# Patient Record
Sex: MALE | Race: WHITE | NOT HISPANIC OR LATINO
[De-identification: names, ages, dates, MRNs, and addresses within clinical notes are randomized per-mention and may not be internally consistent; named-entity substitution may affect disease eponyms.]

---

## 2017-09-25 PROBLEM — Z00.00 ENCOUNTER FOR PREVENTIVE HEALTH EXAMINATION: Status: ACTIVE | Noted: 2017-09-25

## 2017-09-29 ENCOUNTER — APPOINTMENT (OUTPATIENT)
Dept: NEUROLOGY | Facility: CLINIC | Age: 62
End: 2017-09-29
Payer: MEDICAID

## 2017-09-29 VITALS
HEIGHT: 70 IN | HEART RATE: 79 BPM | WEIGHT: 126 LBS | SYSTOLIC BLOOD PRESSURE: 132 MMHG | DIASTOLIC BLOOD PRESSURE: 80 MMHG | BODY MASS INDEX: 18.04 KG/M2

## 2017-09-29 DIAGNOSIS — Z78.9 OTHER SPECIFIED HEALTH STATUS: ICD-10-CM

## 2017-09-29 DIAGNOSIS — F17.200 NICOTINE DEPENDENCE, UNSPECIFIED, UNCOMPLICATED: ICD-10-CM

## 2017-09-29 DIAGNOSIS — M54.12 RADICULOPATHY, CERVICAL REGION: ICD-10-CM

## 2017-09-29 PROCEDURE — 99204 OFFICE O/P NEW MOD 45 MIN: CPT

## 2017-09-29 RX ORDER — TAMSULOSIN HYDROCHLORIDE 0.4 MG/1
0.4 CAPSULE ORAL
Refills: 0 | Status: COMPLETED | COMMUNITY

## 2017-09-29 RX ORDER — CITALOPRAM HYDROBROMIDE 20 MG/1
20 TABLET, FILM COATED ORAL
Refills: 0 | Status: COMPLETED | COMMUNITY

## 2018-03-09 ENCOUNTER — OUTPATIENT (OUTPATIENT)
Dept: OUTPATIENT SERVICES | Facility: HOSPITAL | Age: 63
LOS: 1 days | Discharge: HOME | End: 2018-03-09
Payer: MEDICAID

## 2018-03-09 DIAGNOSIS — N40.1 BENIGN PROSTATIC HYPERPLASIA WITH LOWER URINARY TRACT SYMPTOMS: ICD-10-CM

## 2018-03-09 LAB
ABO RH CONFIRMATION: SIGNIFICANT CHANGE UP
ANION GAP SERPL CALC-SCNC: 6 MMOL/L — SIGNIFICANT CHANGE UP (ref 5–17)
BASOPHILS # BLD AUTO: 0.14 K/UL — SIGNIFICANT CHANGE UP (ref 0–0.2)
BASOPHILS NFR BLD AUTO: 1.5 % — SIGNIFICANT CHANGE UP (ref 0–2)
BLD GP AB SCN SERPL QL: SIGNIFICANT CHANGE UP
BUN SERPL-MCNC: 16 MG/DL — SIGNIFICANT CHANGE UP (ref 7–23)
CALCIUM SERPL-MCNC: 9 MG/DL — SIGNIFICANT CHANGE UP (ref 8.5–10.1)
CHLORIDE SERPL-SCNC: 105 MMOL/L — SIGNIFICANT CHANGE UP (ref 96–108)
CO2 SERPL-SCNC: 29 MMOL/L — SIGNIFICANT CHANGE UP (ref 22–31)
CREAT SERPL-MCNC: 1.04 MG/DL — SIGNIFICANT CHANGE UP (ref 0.5–1.3)
EOSINOPHIL # BLD AUTO: 0.25 K/UL — SIGNIFICANT CHANGE UP (ref 0–0.5)
EOSINOPHIL NFR BLD AUTO: 2.7 % — SIGNIFICANT CHANGE UP (ref 0–6)
GLUCOSE SERPL-MCNC: 87 MG/DL — SIGNIFICANT CHANGE UP (ref 70–99)
HCT VFR BLD CALC: 48.7 % — SIGNIFICANT CHANGE UP (ref 39–50)
HGB BLD-MCNC: 15.6 G/DL — SIGNIFICANT CHANGE UP (ref 13–17)
IMM GRANULOCYTES NFR BLD AUTO: 0.4 % — SIGNIFICANT CHANGE UP (ref 0–1.5)
LYMPHOCYTES # BLD AUTO: 2.2 K/UL — SIGNIFICANT CHANGE UP (ref 1–3.3)
LYMPHOCYTES # BLD AUTO: 24 % — SIGNIFICANT CHANGE UP (ref 13–44)
MCHC RBC-ENTMCNC: 29.9 PG — SIGNIFICANT CHANGE UP (ref 27–34)
MCHC RBC-ENTMCNC: 32 GM/DL — SIGNIFICANT CHANGE UP (ref 32–36)
MCV RBC AUTO: 93.3 FL — SIGNIFICANT CHANGE UP (ref 80–100)
MONOCYTES # BLD AUTO: 0.82 K/UL — SIGNIFICANT CHANGE UP (ref 0–0.9)
MONOCYTES NFR BLD AUTO: 9 % — SIGNIFICANT CHANGE UP (ref 2–14)
NEUTROPHILS # BLD AUTO: 5.71 K/UL — SIGNIFICANT CHANGE UP (ref 1.8–7.4)
NEUTROPHILS NFR BLD AUTO: 62.4 % — SIGNIFICANT CHANGE UP (ref 43–77)
NRBC # BLD: 0 /100 WBCS — SIGNIFICANT CHANGE UP (ref 0–0)
PLATELET # BLD AUTO: 513 K/UL — HIGH (ref 150–400)
POTASSIUM SERPL-MCNC: 4 MMOL/L — SIGNIFICANT CHANGE UP (ref 3.5–5.3)
POTASSIUM SERPL-SCNC: 4 MMOL/L — SIGNIFICANT CHANGE UP (ref 3.5–5.3)
RBC # BLD: 5.22 M/UL — SIGNIFICANT CHANGE UP (ref 4.2–5.8)
RBC # FLD: 13.6 % — SIGNIFICANT CHANGE UP (ref 10.3–14.5)
SODIUM SERPL-SCNC: 140 MMOL/L — SIGNIFICANT CHANGE UP (ref 135–145)
TYPE + AB SCN PNL BLD: SIGNIFICANT CHANGE UP
WBC # BLD: 9.16 K/UL — SIGNIFICANT CHANGE UP (ref 3.8–10.5)
WBC # FLD AUTO: 9.16 K/UL — SIGNIFICANT CHANGE UP (ref 3.8–10.5)

## 2018-03-09 PROCEDURE — 93010 ELECTROCARDIOGRAM REPORT: CPT

## 2018-03-09 NOTE — CHART NOTE - NSCHARTNOTEFT_GEN_A_CORE
vital signs /67R 89 Spo2 99% T 98.1  1. BPH  greenlight laser of PVP   urine c/s and UA not done; Pt currently being treated for UTI vital signs /67R 89 Spo2 99% T 98.1  1. BPH  green-light laser of PVP   urine c/s and UA not done; Pt currently being treated for UTI  left message for Jodi ; Pt needs responsible adult to accompany him after surgery

## 2018-03-09 NOTE — ASU PATIENT PROFILE, ADULT - PMH
BPH (benign prostatic hyperplasia)    Depression BPH (benign prostatic hyperplasia)    Depression    Urinary retention  gonzalez catheter 2/12/1inserted

## 2018-03-14 NOTE — ASU PATIENT PROFILE, ADULT - PMH
BPH (benign prostatic hyperplasia)    Cervical radiculopathy    Depression    Urinary retention  gonzalez catheter 2/12/1inserted

## 2018-03-15 ENCOUNTER — OUTPATIENT (OUTPATIENT)
Dept: OUTPATIENT SERVICES | Facility: HOSPITAL | Age: 63
LOS: 1 days | Discharge: HOME | End: 2018-03-15

## 2018-03-15 VITALS
RESPIRATION RATE: 17 BRPM | HEART RATE: 62 BPM | OXYGEN SATURATION: 97 % | DIASTOLIC BLOOD PRESSURE: 50 MMHG | SYSTOLIC BLOOD PRESSURE: 102 MMHG | TEMPERATURE: 97 F

## 2018-03-15 VITALS
SYSTOLIC BLOOD PRESSURE: 114 MMHG | OXYGEN SATURATION: 98 % | RESPIRATION RATE: 16 BRPM | WEIGHT: 130.07 LBS | TEMPERATURE: 98 F | HEART RATE: 67 BPM | DIASTOLIC BLOOD PRESSURE: 63 MMHG | HEIGHT: 68 IN

## 2018-03-15 RX ORDER — FENTANYL CITRATE 50 UG/ML
50 INJECTION INTRAVENOUS
Qty: 0 | Refills: 0 | Status: DISCONTINUED | OUTPATIENT
Start: 2018-03-15 | End: 2018-03-15

## 2018-03-15 RX ORDER — OXYCODONE HYDROCHLORIDE 5 MG/1
5 TABLET ORAL EVERY 4 HOURS
Qty: 0 | Refills: 0 | Status: DISCONTINUED | OUTPATIENT
Start: 2018-03-15 | End: 2018-03-15

## 2018-03-15 RX ORDER — ACETAMINOPHEN 500 MG
975 TABLET ORAL ONCE
Qty: 0 | Refills: 0 | Status: COMPLETED | OUTPATIENT
Start: 2018-03-15 | End: 2018-03-15

## 2018-03-15 RX ORDER — ONDANSETRON 8 MG/1
4 TABLET, FILM COATED ORAL ONCE
Qty: 0 | Refills: 0 | Status: DISCONTINUED | OUTPATIENT
Start: 2018-03-15 | End: 2018-03-15

## 2018-03-15 RX ORDER — SODIUM CHLORIDE 9 MG/ML
1000 INJECTION, SOLUTION INTRAVENOUS
Qty: 0 | Refills: 0 | Status: DISCONTINUED | OUTPATIENT
Start: 2018-03-15 | End: 2018-03-15

## 2018-03-15 RX ORDER — FAMOTIDINE 10 MG/ML
20 INJECTION INTRAVENOUS ONCE
Qty: 0 | Refills: 0 | Status: COMPLETED | OUTPATIENT
Start: 2018-03-15 | End: 2018-03-15

## 2018-03-15 RX ADMIN — Medication 975 MILLIGRAM(S): at 07:13

## 2018-03-15 RX ADMIN — FAMOTIDINE 20 MILLIGRAM(S): 10 INJECTION INTRAVENOUS at 07:13

## 2018-03-15 NOTE — ASU DISCHARGE PLAN (ADULT/PEDIATRIC). - MEDICATION SUMMARY - MEDICATIONS TO TAKE
I will START or STAY ON the medications listed below when I get home from the hospital:    Lexapro 20 mg oral tablet  -- 1 tab(s) by mouth once a day  -- Indication: For BENIGN PROSTATIC HYPERPLASIA WITH LOWER URINARY TRACT SYMP    Levaquin 500 mg oral tablet  -- 1 tab(s) by mouth every 24 hours started 3/7/18 x 10 days  -- Indication: For BENIGN PROSTATIC HYPERPLASIA WITH LOWER URINARY TRACT SYMP

## 2018-03-15 NOTE — BRIEF OPERATIVE NOTE - PROCEDURE
<<-----Click on this checkbox to enter Procedure Cystoscopy with photoselective vaporization of prostate (PVP)  03/15/2018    Active  [id number]

## 2018-03-20 DIAGNOSIS — R33.8 OTHER RETENTION OF URINE: ICD-10-CM

## 2018-03-20 DIAGNOSIS — N40.1 BENIGN PROSTATIC HYPERPLASIA WITH LOWER URINARY TRACT SYMPTOMS: ICD-10-CM

## 2018-03-20 DIAGNOSIS — F17.200 NICOTINE DEPENDENCE, UNSPECIFIED, UNCOMPLICATED: ICD-10-CM

## 2018-03-20 DIAGNOSIS — F32.9 MAJOR DEPRESSIVE DISORDER, SINGLE EPISODE, UNSPECIFIED: ICD-10-CM

## 2022-05-26 NOTE — ASU PREOP CHECKLIST - BP NONINVASIVE DIASTOLIC (MM HG)
63
Spray Paint Text: The liquid nitrogen was applied to the skin utilizing a spray paint frosting technique.
Include Z78.9 (Other Specified Conditions Influencing Health Status) As An Associated Diagnosis?: No
Show Topical Anesthesia Variable?: Yes
Medical Necessity Information: It is in your best interest to select a reason for this procedure from the list below. All of these items fulfill various CMS LCD requirements except the new and changing color options.
Consent: The patient's consent was obtained including but not limited to risks of crusting, scabbing, blistering, scarring, darker or lighter pigmentary change, recurrence, incomplete removal and infection.
Detail Level: Detailed
Medical Necessity Clause: This procedure was medically necessary because the lesions that were treated were:
Post-Care Instructions: I reviewed with the patient in detail post-care instructions. Patient is to wear sunprotection, and avoid picking at any of the treated lesions. Pt may apply Vaseline to crusted or scabbing areas.

## 2023-12-05 PROBLEM — M54.12 RADICULOPATHY, CERVICAL REGION: Chronic | Status: ACTIVE | Noted: 2018-03-09

## 2023-12-05 PROBLEM — F32.9 MAJOR DEPRESSIVE DISORDER, SINGLE EPISODE, UNSPECIFIED: Chronic | Status: ACTIVE | Noted: 2018-03-09

## 2023-12-05 PROBLEM — R33.9 RETENTION OF URINE, UNSPECIFIED: Chronic | Status: ACTIVE | Noted: 2018-03-09

## 2023-12-05 PROBLEM — N40.0 BENIGN PROSTATIC HYPERPLASIA WITHOUT LOWER URINARY TRACT SYMPTOMS: Chronic | Status: ACTIVE | Noted: 2018-03-09

## 2023-12-13 ENCOUNTER — APPOINTMENT (OUTPATIENT)
Dept: NEUROSURGERY | Facility: CLINIC | Age: 68
End: 2023-12-13
Payer: MEDICARE

## 2023-12-13 VITALS
WEIGHT: 135 LBS | DIASTOLIC BLOOD PRESSURE: 73 MMHG | HEART RATE: 59 BPM | OXYGEN SATURATION: 97 % | HEIGHT: 70 IN | TEMPERATURE: 98 F | BODY MASS INDEX: 19.33 KG/M2 | SYSTOLIC BLOOD PRESSURE: 121 MMHG

## 2023-12-13 DIAGNOSIS — F41.9 ANXIETY DISORDER, UNSPECIFIED: ICD-10-CM

## 2023-12-13 DIAGNOSIS — F32.A ANXIETY DISORDER, UNSPECIFIED: ICD-10-CM

## 2023-12-13 DIAGNOSIS — Z87.39 PERSONAL HISTORY OF OTHER DISEASES OF THE MUSCULOSKELETAL SYSTEM AND CONNECTIVE TISSUE: ICD-10-CM

## 2023-12-13 PROCEDURE — 99205 OFFICE O/P NEW HI 60 MIN: CPT

## 2023-12-13 RX ORDER — LAMOTRIGINE 100 MG/1
100 TABLET ORAL
Refills: 0 | Status: ACTIVE | COMMUNITY

## 2023-12-13 RX ORDER — ROPINIROLE 0.25 MG/1
0.25 TABLET, FILM COATED ORAL
Refills: 0 | Status: ACTIVE | COMMUNITY

## 2023-12-13 RX ORDER — DICLOFENAC SODIUM 1% 10 MG/G
1 GEL TOPICAL
Refills: 0 | Status: ACTIVE | COMMUNITY

## 2023-12-13 RX ORDER — UMECLIDINIUM BROMIDE AND VILANTEROL TRIFENATATE 62.5; 25 UG/1; UG/1
62.5-25 POWDER RESPIRATORY (INHALATION)
Refills: 0 | Status: ACTIVE | COMMUNITY

## 2023-12-13 RX ORDER — PROPRANOLOL HYDROCHLORIDE 10 MG/1
10 TABLET ORAL
Refills: 0 | Status: ACTIVE | COMMUNITY

## 2023-12-13 NOTE — END OF VISIT
[FreeTextEntry3] : Mr. Stephenson is a 68-year-old right-handed male with an extensive pack-year smoking history who was found to have multiple bilateral cavernous carotid aneurysms found on MRA that was performed for hand tremors.  I had an extensive discussion with the patient regarding the natural history of his aneurysms including the risk of rupture and cavernous carotid fistula.  Due to his extensive smoking history I counseled him on smoking cessation and also informed him that this is a major modifiable risk factor in the growth and rupture of aneurysms.  Due to his smoking history and the limitations of flow artifact on MRA I suggested that we perform a cerebral angiogram to better characterize his aneurysms and to better assess his risk of growth and rupture.  I have tentatively scheduled him for January 9, 2024 and I will have a discussion regarding further management after his cerebral angiogram.  The risks, benefits and alternatives of cerebral angiography were discussed with the patient.  All questions were answered and he requested that we proceed.

## 2023-12-13 NOTE — HISTORY OF PRESENT ILLNESS
[FreeTextEntry1] : Cerebral aneurysms  [de-identified] : Mr. Stephenson is a 68 year old RIGHT HANDED MALE who presents for neurosurgical consultation for incidental cerebral aneurysms recently discovered on imaging by neurology due to hand tremors.  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 No significant stenosis or embolic occlusion. Patient reports smoking 1 1/2 pack a day, history or HTN with no reported family history of cerebral aneurysms. No reported allergies and patient denies taking any blood thinners.  Plan: Cerebral angiogram 1/9/24 1030 am

## 2024-01-03 ENCOUNTER — OUTPATIENT (OUTPATIENT)
Dept: OUTPATIENT SERVICES | Facility: HOSPITAL | Age: 69
LOS: 1 days | End: 2024-01-03
Payer: MEDICARE

## 2024-01-03 VITALS
SYSTOLIC BLOOD PRESSURE: 150 MMHG | OXYGEN SATURATION: 96 % | DIASTOLIC BLOOD PRESSURE: 73 MMHG | RESPIRATION RATE: 16 BRPM | HEIGHT: 70 IN | WEIGHT: 123.24 LBS | TEMPERATURE: 98 F | HEART RATE: 54 BPM

## 2024-01-03 DIAGNOSIS — I67.1 CEREBRAL ANEURYSM, NONRUPTURED: ICD-10-CM

## 2024-01-03 DIAGNOSIS — N40.0 BENIGN PROSTATIC HYPERPLASIA WITHOUT LOWER URINARY TRACT SYMPTOMS: ICD-10-CM

## 2024-01-03 DIAGNOSIS — Z29.9 ENCOUNTER FOR PROPHYLACTIC MEASURES, UNSPECIFIED: ICD-10-CM

## 2024-01-03 DIAGNOSIS — Z01.818 ENCOUNTER FOR OTHER PREPROCEDURAL EXAMINATION: ICD-10-CM

## 2024-01-03 DIAGNOSIS — F32.9 MAJOR DEPRESSIVE DISORDER, SINGLE EPISODE, UNSPECIFIED: ICD-10-CM

## 2024-01-03 LAB
A1C WITH ESTIMATED AVERAGE GLUCOSE RESULT: 5.9 % — HIGH (ref 4–5.6)
A1C WITH ESTIMATED AVERAGE GLUCOSE RESULT: 5.9 % — HIGH (ref 4–5.6)
ALBUMIN SERPL ELPH-MCNC: 3.6 G/DL — SIGNIFICANT CHANGE UP (ref 3.3–5.2)
ALBUMIN SERPL ELPH-MCNC: 3.6 G/DL — SIGNIFICANT CHANGE UP (ref 3.3–5.2)
ALP SERPL-CCNC: 97 U/L — SIGNIFICANT CHANGE UP (ref 40–120)
ALP SERPL-CCNC: 97 U/L — SIGNIFICANT CHANGE UP (ref 40–120)
ALT FLD-CCNC: 8 U/L — SIGNIFICANT CHANGE UP
ALT FLD-CCNC: 8 U/L — SIGNIFICANT CHANGE UP
ANION GAP SERPL CALC-SCNC: 12 MMOL/L — SIGNIFICANT CHANGE UP (ref 5–17)
ANION GAP SERPL CALC-SCNC: 12 MMOL/L — SIGNIFICANT CHANGE UP (ref 5–17)
APTT BLD: 34.6 SEC — SIGNIFICANT CHANGE UP (ref 24.5–35.6)
APTT BLD: 34.6 SEC — SIGNIFICANT CHANGE UP (ref 24.5–35.6)
AST SERPL-CCNC: 20 U/L — SIGNIFICANT CHANGE UP
AST SERPL-CCNC: 20 U/L — SIGNIFICANT CHANGE UP
BASOPHILS # BLD AUTO: 0.09 K/UL — SIGNIFICANT CHANGE UP (ref 0–0.2)
BASOPHILS # BLD AUTO: 0.09 K/UL — SIGNIFICANT CHANGE UP (ref 0–0.2)
BASOPHILS NFR BLD AUTO: 0.8 % — SIGNIFICANT CHANGE UP (ref 0–2)
BASOPHILS NFR BLD AUTO: 0.8 % — SIGNIFICANT CHANGE UP (ref 0–2)
BILIRUB SERPL-MCNC: 0.4 MG/DL — SIGNIFICANT CHANGE UP (ref 0.4–2)
BILIRUB SERPL-MCNC: 0.4 MG/DL — SIGNIFICANT CHANGE UP (ref 0.4–2)
BLD GP AB SCN SERPL QL: SIGNIFICANT CHANGE UP
BLD GP AB SCN SERPL QL: SIGNIFICANT CHANGE UP
BUN SERPL-MCNC: 20.8 MG/DL — HIGH (ref 8–20)
BUN SERPL-MCNC: 20.8 MG/DL — HIGH (ref 8–20)
CALCIUM SERPL-MCNC: 9.2 MG/DL — SIGNIFICANT CHANGE UP (ref 8.4–10.5)
CALCIUM SERPL-MCNC: 9.2 MG/DL — SIGNIFICANT CHANGE UP (ref 8.4–10.5)
CHLORIDE SERPL-SCNC: 102 MMOL/L — SIGNIFICANT CHANGE UP (ref 96–108)
CHLORIDE SERPL-SCNC: 102 MMOL/L — SIGNIFICANT CHANGE UP (ref 96–108)
CO2 SERPL-SCNC: 27 MMOL/L — SIGNIFICANT CHANGE UP (ref 22–29)
CO2 SERPL-SCNC: 27 MMOL/L — SIGNIFICANT CHANGE UP (ref 22–29)
CREAT SERPL-MCNC: 1.24 MG/DL — SIGNIFICANT CHANGE UP (ref 0.5–1.3)
CREAT SERPL-MCNC: 1.24 MG/DL — SIGNIFICANT CHANGE UP (ref 0.5–1.3)
EGFR: 64 ML/MIN/1.73M2 — SIGNIFICANT CHANGE UP
EGFR: 64 ML/MIN/1.73M2 — SIGNIFICANT CHANGE UP
EOSINOPHIL # BLD AUTO: 0.12 K/UL — SIGNIFICANT CHANGE UP (ref 0–0.5)
EOSINOPHIL # BLD AUTO: 0.12 K/UL — SIGNIFICANT CHANGE UP (ref 0–0.5)
EOSINOPHIL NFR BLD AUTO: 1 % — SIGNIFICANT CHANGE UP (ref 0–6)
EOSINOPHIL NFR BLD AUTO: 1 % — SIGNIFICANT CHANGE UP (ref 0–6)
ESTIMATED AVERAGE GLUCOSE: 123 MG/DL — HIGH (ref 68–114)
ESTIMATED AVERAGE GLUCOSE: 123 MG/DL — HIGH (ref 68–114)
GLUCOSE SERPL-MCNC: 87 MG/DL — SIGNIFICANT CHANGE UP (ref 70–99)
GLUCOSE SERPL-MCNC: 87 MG/DL — SIGNIFICANT CHANGE UP (ref 70–99)
HCT VFR BLD CALC: 43.1 % — SIGNIFICANT CHANGE UP (ref 39–50)
HCT VFR BLD CALC: 43.1 % — SIGNIFICANT CHANGE UP (ref 39–50)
HGB BLD-MCNC: 14.2 G/DL — SIGNIFICANT CHANGE UP (ref 13–17)
HGB BLD-MCNC: 14.2 G/DL — SIGNIFICANT CHANGE UP (ref 13–17)
IMM GRANULOCYTES NFR BLD AUTO: 0.7 % — SIGNIFICANT CHANGE UP (ref 0–0.9)
IMM GRANULOCYTES NFR BLD AUTO: 0.7 % — SIGNIFICANT CHANGE UP (ref 0–0.9)
INR BLD: 1.05 RATIO — SIGNIFICANT CHANGE UP (ref 0.85–1.18)
INR BLD: 1.05 RATIO — SIGNIFICANT CHANGE UP (ref 0.85–1.18)
LYMPHOCYTES # BLD AUTO: 17.8 % — SIGNIFICANT CHANGE UP (ref 13–44)
LYMPHOCYTES # BLD AUTO: 17.8 % — SIGNIFICANT CHANGE UP (ref 13–44)
LYMPHOCYTES # BLD AUTO: 2.13 K/UL — SIGNIFICANT CHANGE UP (ref 1–3.3)
LYMPHOCYTES # BLD AUTO: 2.13 K/UL — SIGNIFICANT CHANGE UP (ref 1–3.3)
MCHC RBC-ENTMCNC: 30.2 PG — SIGNIFICANT CHANGE UP (ref 27–34)
MCHC RBC-ENTMCNC: 30.2 PG — SIGNIFICANT CHANGE UP (ref 27–34)
MCHC RBC-ENTMCNC: 32.9 GM/DL — SIGNIFICANT CHANGE UP (ref 32–36)
MCHC RBC-ENTMCNC: 32.9 GM/DL — SIGNIFICANT CHANGE UP (ref 32–36)
MCV RBC AUTO: 91.7 FL — SIGNIFICANT CHANGE UP (ref 80–100)
MCV RBC AUTO: 91.7 FL — SIGNIFICANT CHANGE UP (ref 80–100)
MONOCYTES # BLD AUTO: 1.49 K/UL — HIGH (ref 0–0.9)
MONOCYTES # BLD AUTO: 1.49 K/UL — HIGH (ref 0–0.9)
MONOCYTES NFR BLD AUTO: 12.4 % — SIGNIFICANT CHANGE UP (ref 2–14)
MONOCYTES NFR BLD AUTO: 12.4 % — SIGNIFICANT CHANGE UP (ref 2–14)
MRSA PCR RESULT.: SIGNIFICANT CHANGE UP
MRSA PCR RESULT.: SIGNIFICANT CHANGE UP
NEUTROPHILS # BLD AUTO: 8.08 K/UL — HIGH (ref 1.8–7.4)
NEUTROPHILS # BLD AUTO: 8.08 K/UL — HIGH (ref 1.8–7.4)
NEUTROPHILS NFR BLD AUTO: 67.3 % — SIGNIFICANT CHANGE UP (ref 43–77)
NEUTROPHILS NFR BLD AUTO: 67.3 % — SIGNIFICANT CHANGE UP (ref 43–77)
PLATELET # BLD AUTO: 506 K/UL — HIGH (ref 150–400)
PLATELET # BLD AUTO: 506 K/UL — HIGH (ref 150–400)
POTASSIUM SERPL-MCNC: 4.6 MMOL/L — SIGNIFICANT CHANGE UP (ref 3.5–5.3)
POTASSIUM SERPL-MCNC: 4.6 MMOL/L — SIGNIFICANT CHANGE UP (ref 3.5–5.3)
POTASSIUM SERPL-SCNC: 4.6 MMOL/L — SIGNIFICANT CHANGE UP (ref 3.5–5.3)
POTASSIUM SERPL-SCNC: 4.6 MMOL/L — SIGNIFICANT CHANGE UP (ref 3.5–5.3)
PROT SERPL-MCNC: 6.7 G/DL — SIGNIFICANT CHANGE UP (ref 6.6–8.7)
PROT SERPL-MCNC: 6.7 G/DL — SIGNIFICANT CHANGE UP (ref 6.6–8.7)
PROTHROM AB SERPL-ACNC: 11.6 SEC — SIGNIFICANT CHANGE UP (ref 9.5–13)
PROTHROM AB SERPL-ACNC: 11.6 SEC — SIGNIFICANT CHANGE UP (ref 9.5–13)
RBC # BLD: 4.7 M/UL — SIGNIFICANT CHANGE UP (ref 4.2–5.8)
RBC # BLD: 4.7 M/UL — SIGNIFICANT CHANGE UP (ref 4.2–5.8)
RBC # FLD: 13.2 % — SIGNIFICANT CHANGE UP (ref 10.3–14.5)
RBC # FLD: 13.2 % — SIGNIFICANT CHANGE UP (ref 10.3–14.5)
S AUREUS DNA NOSE QL NAA+PROBE: SIGNIFICANT CHANGE UP
S AUREUS DNA NOSE QL NAA+PROBE: SIGNIFICANT CHANGE UP
SODIUM SERPL-SCNC: 141 MMOL/L — SIGNIFICANT CHANGE UP (ref 135–145)
SODIUM SERPL-SCNC: 141 MMOL/L — SIGNIFICANT CHANGE UP (ref 135–145)
WBC # BLD: 11.99 K/UL — HIGH (ref 3.8–10.5)
WBC # BLD: 11.99 K/UL — HIGH (ref 3.8–10.5)
WBC # FLD AUTO: 11.99 K/UL — HIGH (ref 3.8–10.5)
WBC # FLD AUTO: 11.99 K/UL — HIGH (ref 3.8–10.5)

## 2024-01-03 PROCEDURE — 93005 ELECTROCARDIOGRAM TRACING: CPT

## 2024-01-03 PROCEDURE — G0463: CPT

## 2024-01-03 PROCEDURE — 71046 X-RAY EXAM CHEST 2 VIEWS: CPT

## 2024-01-03 PROCEDURE — 71046 X-RAY EXAM CHEST 2 VIEWS: CPT | Mod: 26

## 2024-01-03 PROCEDURE — 93010 ELECTROCARDIOGRAM REPORT: CPT

## 2024-01-03 RX ORDER — ESCITALOPRAM OXALATE 10 MG/1
1 TABLET, FILM COATED ORAL
Qty: 0 | Refills: 0 | DISCHARGE

## 2024-01-03 RX ORDER — LEVOFLOXACIN 5 MG/ML
1 INJECTION, SOLUTION INTRAVENOUS
Qty: 0 | Refills: 0 | DISCHARGE

## 2024-01-03 RX ORDER — SODIUM CHLORIDE 9 MG/ML
3 INJECTION INTRAMUSCULAR; INTRAVENOUS; SUBCUTANEOUS ONCE
Refills: 0 | Status: DISCONTINUED | OUTPATIENT
Start: 2024-01-09 | End: 2024-01-23

## 2024-01-03 NOTE — H&P PST ADULT - ENMT COMMENTS
MPS MPS, has full upper and full lower dentures MPS, has full upper and full lower dentures but not wearing them, missing all teeth

## 2024-01-03 NOTE — H&P PST ADULT - NSICDXPASTMEDICALHX_GEN_ALL_CORE_FT
PAST MEDICAL HISTORY:  BPH (benign prostatic hyperplasia)     Cervical radiculopathy     Depression     Urinary retention gonzalez catheter 2/12/1inserted     PAST MEDICAL HISTORY:  BPH (benign prostatic hyperplasia)     Cervical radiculopathy     COPD, severe     Depression     Emphysema/COPD     Tremors of nervous system     Urinary retention gonzalez catheter 2/12/1inserted

## 2024-01-03 NOTE — H&P PST ADULT - HISTORY OF PRESENT ILLNESS
68 year old male with PMH of smoking 1 1/2 pack a day, and  HTN with no reported family history of cerebral aneurysms here for PST with  incidental cerebral aneurysms recently discovered on imaging by neurology due to hand tremors. MRA on 2/2023 showed  Laterally directed aneurysms arising from the bilateral proximal cavernous segment ICAs measuring up to 5 mm on the left and 3 mm on the right. 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 No significant stenosis or embolic occlusion. he is now scheduled for a Cerebral angiogram 1/9/24 by dr. Samuel.  68 year old male with PMH of smoking 1 1/2 pack a day, and  HTN, COPD, Emphysema, Tremors here for PST with  incidental cerebral aneurysms recently discovered on imaging by neurology due to hand tremors.( MRA on 2/2023 showed  Laterally directed aneurysms arising from the bilateral proximal cavernous segment ICAs measuring up to 5 mm on the left and 3 mm on the right. 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 No significant stenosis or embolic occlusion. He reports a mild headache on the left side of his head occasionally otherwise, Denies dizziness, LOC, lightheadedness or recent falls,  he is now scheduled for a Cerebral angiogram 1/9/24 by dr. Samuel.

## 2024-01-03 NOTE — H&P PST ADULT - NSICDXFAMILYHX_GEN_ALL_CORE_FT
FAMILY HISTORY:  Father  Still living? No  FH: emphysema, Age at diagnosis: Age Unknown    Mother  Still living? No  FH: throat cancer, Age at diagnosis: Age Unknown

## 2024-01-03 NOTE — H&P PST ADULT - PROBLEM SELECTOR PLAN 1
Caprini Score 5 High risk,  Surgical team should assess /Strongly recommend pharmacological and mechanical measures for VTE prophylaxis

## 2024-01-03 NOTE — H&P PST ADULT - ASSESSMENT
CAPRINI VTE 2.0 SCORE [CLOT updated 2019]    AGE RELATED RISK FACTORS                                                       MOBILITY RELATED FACTORS  [ ] Age 41-60 years                                            (1 Point)                    [ ] Bed rest                                                        (1 Point)  [ ] Age: 61-74 years                                           (2 Points)                  [ ] Plaster cast                                                   (2 Points)  [ ] Age= 75 years                                              (3 Points)                    [ ] Bed bound for more than 72 hours                 (2 Points)    DISEASE RELATED RISK FACTORS                                               GENDER SPECIFIC FACTORS  [ ] Edema in the lower extremities                       (1 Point)              [ ] Pregnancy                                                     (1 Point)  [ ] Varicose veins                                               (1 Point)                     [ ] Post-partum < 6 weeks                                   (1 Point)             [ ] BMI > 25 Kg/m2                                            (1 Point)                     [ ] Hormonal therapy  or oral contraception          (1 Point)                 [ ] Sepsis (in the previous month)                        (1 Point)               [ ] History of pregnancy complications                 (1 point)  [ ] Pneumonia or serious lung disease                                               [ ] Unexplained or recurrent                     (1 Point)           (in the previous month)                               (1 Point)  [ ] Abnormal pulmonary function test                     (1 Point)                 SURGERY RELATED RISK FACTORS  [ ] Acute myocardial infarction                              (1 Point)               [ ]  Section                                             (1 Point)  [ ] Congestive heart failure (in the previous month)  (1 Point)      [ ] Minor surgery                                                  (1 Point)   [ ] Inflammatory bowel disease                             (1 Point)               [ ] Arthroscopic surgery                                        (2 Points)  [ ] Central venous access                                      (2 Points)                [ ] General surgery lasting more than 45 minutes (2 points)  [ ] Malignancy- Present or previous                   (2 Points)                [ ] Elective arthroplasty                                         (5 points)    [ ] Stroke (in the previous month)                          (5 Points)                                                                                                                                                           HEMATOLOGY RELATED FACTORS                                                 TRAUMA RELATED RISK FACTORS  [ ] Prior episodes of VTE                                     (3 Points)                [ ] Fracture of the hip, pelvis, or leg                       (5 Points)  [ ] Positive family history for VTE                         (3 Points)             [ ] Acute spinal cord injury (in the previous month)  (5 Points)  [ ] Prothrombin 42637 A                                     (3 Points)               [ ] Paralysis  (less than 1 month)                             (5 Points)  [ ] Factor V Leiden                                             (3 Points)                  [ ] Multiple Trauma within 1 month                        (5 Points)  [ ] Lupus anticoagulants                                     (3 Points)                                                           [ ] Anticardiolipin antibodies                               (3 Points)                                                       [ ] High homocysteine in the blood                      (3 Points)                                             [ ] Other congenital or acquired thrombophilia      (3 Points)                                                [ ] Heparin induced thrombocytopenia                  (3 Points)                                     Total Score [          ]  OPIOID RISK TOOL    SHAY EACH BOX THAT APPLIES AND ADD TOTALS AT THE END    FAMILY HISTORY OF SUBSTANCE ABUSE                 FEMALE         MALE                                                Alcohol                             [  ]1 pt          [  ]3pts                                               Illegal Drugs                     [  ]2 pts        [  ]3pts                                               Rx Drugs                           [  ]4 pts        [  ]4 pts    PERSONAL HISTORY OF SUBSTANCE ABUSE                                                                                          Alcohol                             [  ]3 pts       [  ]3 pts                                               Illegal Drugs                     [  ]4 pts        [  ]4 pts                                               Rx Drugs                           [  ]5 pts        [  ]5 pts    AGE BETWEEN 16-45 YEARS                                      [  ]1 pt         [  ]1 pt    HISTORY OF PREADOLESCENT   SEXUAL ABUSE                                                             [  ]3 pts        [  ]0pts    PSYCHOLOGICAL DISEASE                     ADD, OCD, Bipolar, Schizophrenia        [  ]2 pts         [  ]2 pts                      Depression                                               [  ]1 pt           [  ]1 pt           SCORING TOTAL   (add numbers and type here)              ( 0)                                     A score of 3 or lower indicated LOW risk for future opioid abuse  A score of 4 to 7 indicated moderate risk for future opioid abuse  A score of 8 or higher indicates a high risk for opioid abuse     CAPRINI VTE 2.0 SCORE [CLOT updated 2019]    AGE RELATED RISK FACTORS                                                       MOBILITY RELATED FACTORS  [ ] Age 41-60 years                                            (1 Point)                    [ ] Bed rest                                                        (1 Point)  [ ] Age: 61-74 years                                           (2 Points)                  [ ] Plaster cast                                                   (2 Points)  [ ] Age= 75 years                                              (3 Points)                    [ ] Bed bound for more than 72 hours                 (2 Points)    DISEASE RELATED RISK FACTORS                                               GENDER SPECIFIC FACTORS  [ ] Edema in the lower extremities                       (1 Point)              [ ] Pregnancy                                                     (1 Point)  [ ] Varicose veins                                               (1 Point)                     [ ] Post-partum < 6 weeks                                   (1 Point)             [ ] BMI > 25 Kg/m2                                            (1 Point)                     [ ] Hormonal therapy  or oral contraception          (1 Point)                 [ ] Sepsis (in the previous month)                        (1 Point)               [ ] History of pregnancy complications                 (1 point)  [ ] Pneumonia or serious lung disease                                               [ ] Unexplained or recurrent                     (1 Point)           (in the previous month)                               (1 Point)  [ ] Abnormal pulmonary function test                     (1 Point)                 SURGERY RELATED RISK FACTORS  [ ] Acute myocardial infarction                              (1 Point)               [ ]  Section                                             (1 Point)  [ ] Congestive heart failure (in the previous month)  (1 Point)      [ ] Minor surgery                                                  (1 Point)   [ ] Inflammatory bowel disease                             (1 Point)               [ ] Arthroscopic surgery                                        (2 Points)  [ ] Central venous access                                      (2 Points)                [ ] General surgery lasting more than 45 minutes (2 points)  [ ] Malignancy- Present or previous                   (2 Points)                [ ] Elective arthroplasty                                         (5 points)    [ ] Stroke (in the previous month)                          (5 Points)                                                                                                                                                           HEMATOLOGY RELATED FACTORS                                                 TRAUMA RELATED RISK FACTORS  [ ] Prior episodes of VTE                                     (3 Points)                [ ] Fracture of the hip, pelvis, or leg                       (5 Points)  [ ] Positive family history for VTE                         (3 Points)             [ ] Acute spinal cord injury (in the previous month)  (5 Points)  [ ] Prothrombin 78802 A                                     (3 Points)               [ ] Paralysis  (less than 1 month)                             (5 Points)  [ ] Factor V Leiden                                             (3 Points)                  [ ] Multiple Trauma within 1 month                        (5 Points)  [ ] Lupus anticoagulants                                     (3 Points)                                                           [ ] Anticardiolipin antibodies                               (3 Points)                                                       [ ] High homocysteine in the blood                      (3 Points)                                             [ ] Other congenital or acquired thrombophilia      (3 Points)                                                [ ] Heparin induced thrombocytopenia                  (3 Points)                                     Total Score [          ]  OPIOID RISK TOOL    SHAY EACH BOX THAT APPLIES AND ADD TOTALS AT THE END    FAMILY HISTORY OF SUBSTANCE ABUSE                 FEMALE         MALE                                                Alcohol                             [  ]1 pt          [  ]3pts                                               Illegal Drugs                     [  ]2 pts        [  ]3pts                                               Rx Drugs                           [  ]4 pts        [  ]4 pts    PERSONAL HISTORY OF SUBSTANCE ABUSE                                                                                          Alcohol                             [  ]3 pts       [  ]3 pts                                               Illegal Drugs                     [  ]4 pts        [  ]4 pts                                               Rx Drugs                           [  ]5 pts        [  ]5 pts    AGE BETWEEN 16-45 YEARS                                      [  ]1 pt         [  ]1 pt    HISTORY OF PREADOLESCENT   SEXUAL ABUSE                                                             [  ]3 pts        [  ]0pts    PSYCHOLOGICAL DISEASE                     ADD, OCD, Bipolar, Schizophrenia        [  ]2 pts         [  ]2 pts                      Depression                                               [  ]1 pt           [  ]1 pt           SCORING TOTAL   (add numbers and type here)              ( 0)                                     A score of 3 or lower indicated LOW risk for future opioid abuse  A score of 4 to 7 indicated moderate risk for future opioid abuse  A score of 8 or higher indicates a high risk for opioid abuse 68 year old male with PMH of smoking 1 1/2 pack a day, and  HTN, COPD, Emphysema, Tremors here for PST with  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 No significant stenosis or embolic occlusion. He reports a mild headache on the left side of his head occasionally otherwise, Denies dizziness, LOC, lightheadedness or recent falls,  he is now scheduled for a Cerebral angiogram 24 by dr. Samuel.     medications reviewed, instructions given on what medications to take and what not to take. Asked the patient to take the Blood pressure medication/ heart medication or any other important meds with a sip of water in the AM of surgery ( Propranolol, Buspar, Lamictal, Celexa and Ellipta) All other meds can be continued till the night before surgery.        CAPRINI VTE 2.0 SCORE [CLOT updated 2019]    AGE RELATED RISK FACTORS                                                       MOBILITY RELATED FACTORS  [ ] Age 41-60 years                                            (1 Point)                    [ ] Bed rest                                                        (1 Point)  x[ ] Age: 61-74 years                                           (2 Points)                  [ ] Plaster cast                                                   (2 Points)  [ ] Age= 75 years                                              (3 Points)                    [ ] Bed bound for more than 72 hours                 (2 Points)    DISEASE RELATED RISK FACTORS                                               GENDER SPECIFIC FACTORS  [ ] Edema in the lower extremities                       (1 Point)              [ ] Pregnancy                                                     (1 Point)  [ ] Varicose veins                                               (1 Point)                     [ ] Post-partum < 6 weeks                                   (1 Point)             [ ] BMI > 25 Kg/m2                                            (1 Point)                     [ ] Hormonal therapy  or oral contraception          (1 Point)                 [ ] Sepsis (in the previous month)                        (1 Point)               [ ] History of pregnancy complications                 (1 point)  [ x] Pneumonia or serious lung disease                                               [ ] Unexplained or recurrent                     (1 Point)           (in the previous month)                               (1 Point)  [x ] Abnormal pulmonary function test                     (1 Point)                 SURGERY RELATED RISK FACTORS  [ ] Acute myocardial infarction                              (1 Point)               [ ]  Section                                             (1 Point)  [ ] Congestive heart failure (in the previous month)  (1 Point)      [x ] Minor surgery                                                  (1 Point)   [ ] Inflammatory bowel disease                             (1 Point)               [ ] Arthroscopic surgery                                        (2 Points)  [ ] Central venous access                                      (2 Points)                [ ] General surgery lasting more than 45 minutes (2 points)  [ ] Malignancy- Present or previous                   (2 Points)                [ ] Elective arthroplasty                                         (5 points)    [ ] Stroke (in the previous month)                          (5 Points)                                                                                                                                                           HEMATOLOGY RELATED FACTORS                                                 TRAUMA RELATED RISK FACTORS  [ ] Prior episodes of VTE                                     (3 Points)                [ ] Fracture of the hip, pelvis, or leg                       (5 Points)  [ ] Positive family history for VTE                         (3 Points)             [ ] Acute spinal cord injury (in the previous month)  (5 Points)  [ ] Prothrombin 74274 A                                     (3 Points)               [ ] Paralysis  (less than 1 month)                             (5 Points)  [ ] Factor V Leiden                                             (3 Points)                  [ ] Multiple Trauma within 1 month                        (5 Points)  [ ] Lupus anticoagulants                                     (3 Points)                                                           [ ] Anticardiolipin antibodies                               (3 Points)                                                       [ ] High homocysteine in the blood                      (3 Points)                                             [ ] Other congenital or acquired thrombophilia      (3 Points)                                                [ ] Heparin induced thrombocytopenia                  (3 Points)                                     Total Score [      5    ]  OPIOID RISK TOOL    SHAY EACH BOX THAT APPLIES AND ADD TOTALS AT THE END    FAMILY HISTORY OF SUBSTANCE ABUSE                 FEMALE         MALE                                                Alcohol                             [  ]1 pt          [  ]3pts                                               Illegal Drugs                     [  ]2 pts        [  ]3pts                                               Rx Drugs                           [  ]4 pts        [  ]4 pts    PERSONAL HISTORY OF SUBSTANCE ABUSE                                                                                          Alcohol                             [  ]3 pts       [  ]3 pts                                               Illegal Drugs                     [  ]4 pts        [  ]4 pts                                               Rx Drugs                           [  ]5 pts        [  ]5 pts    AGE BETWEEN 16-45 YEARS                                      [  ]1 pt         [  ]1 pt    HISTORY OF PREADOLESCENT   SEXUAL ABUSE                                                             [  ]3 pts        [  ]0pts    PSYCHOLOGICAL DISEASE                     ADD, OCD, Bipolar, Schizophrenia        [  ]2 pts         [  ]2 pts                      Depression                                               [  ]1 pt           [  ]1 pt           SCORING TOTAL   (add numbers and type here)              ( 0)                                     A score of 3 or lower indicated LOW risk for future opioid abuse  A score of 4 to 7 indicated moderate risk for future opioid abuse  A score of 8 or higher indicates a high risk for opioid abuse 68 year old male with PMH of smoking 1 1/2 pack a day, and  HTN, COPD, Emphysema, Tremors here for PST with  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 No significant stenosis or embolic occlusion. He reports a mild headache on the left side of his head occasionally otherwise, Denies dizziness, LOC, lightheadedness or recent falls,  he is now scheduled for a Cerebral angiogram 24 by dr. Samuel.     medications reviewed, instructions given on what medications to take and what not to take. Asked the patient to take the Blood pressure medication/ heart medication or any other important meds with a sip of water in the AM of surgery ( Propranolol, Buspar, Lamictal, Celexa and Ellipta) All other meds can be continued till the night before surgery.        CAPRINI VTE 2.0 SCORE [CLOT updated 2019]    AGE RELATED RISK FACTORS                                                       MOBILITY RELATED FACTORS  [ ] Age 41-60 years                                            (1 Point)                    [ ] Bed rest                                                        (1 Point)  x[ ] Age: 61-74 years                                           (2 Points)                  [ ] Plaster cast                                                   (2 Points)  [ ] Age= 75 years                                              (3 Points)                    [ ] Bed bound for more than 72 hours                 (2 Points)    DISEASE RELATED RISK FACTORS                                               GENDER SPECIFIC FACTORS  [ ] Edema in the lower extremities                       (1 Point)              [ ] Pregnancy                                                     (1 Point)  [ ] Varicose veins                                               (1 Point)                     [ ] Post-partum < 6 weeks                                   (1 Point)             [ ] BMI > 25 Kg/m2                                            (1 Point)                     [ ] Hormonal therapy  or oral contraception          (1 Point)                 [ ] Sepsis (in the previous month)                        (1 Point)               [ ] History of pregnancy complications                 (1 point)  [ x] Pneumonia or serious lung disease                                               [ ] Unexplained or recurrent                     (1 Point)           (in the previous month)                               (1 Point)  [x ] Abnormal pulmonary function test                     (1 Point)                 SURGERY RELATED RISK FACTORS  [ ] Acute myocardial infarction                              (1 Point)               [ ]  Section                                             (1 Point)  [ ] Congestive heart failure (in the previous month)  (1 Point)      [x ] Minor surgery                                                  (1 Point)   [ ] Inflammatory bowel disease                             (1 Point)               [ ] Arthroscopic surgery                                        (2 Points)  [ ] Central venous access                                      (2 Points)                [ ] General surgery lasting more than 45 minutes (2 points)  [ ] Malignancy- Present or previous                   (2 Points)                [ ] Elective arthroplasty                                         (5 points)    [ ] Stroke (in the previous month)                          (5 Points)                                                                                                                                                           HEMATOLOGY RELATED FACTORS                                                 TRAUMA RELATED RISK FACTORS  [ ] Prior episodes of VTE                                     (3 Points)                [ ] Fracture of the hip, pelvis, or leg                       (5 Points)  [ ] Positive family history for VTE                         (3 Points)             [ ] Acute spinal cord injury (in the previous month)  (5 Points)  [ ] Prothrombin 87381 A                                     (3 Points)               [ ] Paralysis  (less than 1 month)                             (5 Points)  [ ] Factor V Leiden                                             (3 Points)                  [ ] Multiple Trauma within 1 month                        (5 Points)  [ ] Lupus anticoagulants                                     (3 Points)                                                           [ ] Anticardiolipin antibodies                               (3 Points)                                                       [ ] High homocysteine in the blood                      (3 Points)                                             [ ] Other congenital or acquired thrombophilia      (3 Points)                                                [ ] Heparin induced thrombocytopenia                  (3 Points)                                     Total Score [      5    ]  OPIOID RISK TOOL    SHAY EACH BOX THAT APPLIES AND ADD TOTALS AT THE END    FAMILY HISTORY OF SUBSTANCE ABUSE                 FEMALE         MALE                                                Alcohol                             [  ]1 pt          [  ]3pts                                               Illegal Drugs                     [  ]2 pts        [  ]3pts                                               Rx Drugs                           [  ]4 pts        [  ]4 pts    PERSONAL HISTORY OF SUBSTANCE ABUSE                                                                                          Alcohol                             [  ]3 pts       [  ]3 pts                                               Illegal Drugs                     [  ]4 pts        [  ]4 pts                                               Rx Drugs                           [  ]5 pts        [  ]5 pts    AGE BETWEEN 16-45 YEARS                                      [  ]1 pt         [  ]1 pt    HISTORY OF PREADOLESCENT   SEXUAL ABUSE                                                             [  ]3 pts        [  ]0pts    PSYCHOLOGICAL DISEASE                     ADD, OCD, Bipolar, Schizophrenia        [  ]2 pts         [  ]2 pts                      Depression                                               [  ]1 pt           [  ]1 pt           SCORING TOTAL   (add numbers and type here)              ( 0)                                     A score of 3 or lower indicated LOW risk for future opioid abuse  A score of 4 to 7 indicated moderate risk for future opioid abuse  A score of 8 or higher indicates a high risk for opioid abuse 68 year old male with PMH of smoking 1 1/2 pack a day, and  HTN, COPD, Emphysema, Tremors here for PST with  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 No significant stenosis or embolic occlusion. He reports a mild headache on the left side of his head occasionally otherwise, Denies dizziness, LOC, lightheadedness or recent falls,  he is now scheduled for a Cerebral angiogram 24 by dr. Samuel.     medications reviewed, instructions given on what medications to take and what not to take. Asked the patient to take the Blood pressure medication/ heart medication or any other important meds with a sip of water in the AM of surgery ( Propranolol, Buspar, Lamictal, Celexa and Ellipta) All other meds can be continued till the night before surgery.    1-3-24: abnormal labs were emailed to dr. samuel's office. E.Verghese NP-C       CAPRINI VTE 2.0 SCORE [CLOT updated 2019]    AGE RELATED RISK FACTORS                                                       MOBILITY RELATED FACTORS  [ ] Age 41-60 years                                            (1 Point)                    [ ] Bed rest                                                        (1 Point)  x[ ] Age: 61-74 years                                           (2 Points)                  [ ] Plaster cast                                                   (2 Points)  [ ] Age= 75 years                                              (3 Points)                    [ ] Bed bound for more than 72 hours                 (2 Points)    DISEASE RELATED RISK FACTORS                                               GENDER SPECIFIC FACTORS  [ ] Edema in the lower extremities                       (1 Point)              [ ] Pregnancy                                                     (1 Point)  [ ] Varicose veins                                               (1 Point)                     [ ] Post-partum < 6 weeks                                   (1 Point)             [ ] BMI > 25 Kg/m2                                            (1 Point)                     [ ] Hormonal therapy  or oral contraception          (1 Point)                 [ ] Sepsis (in the previous month)                        (1 Point)               [ ] History of pregnancy complications                 (1 point)  [ x] Pneumonia or serious lung disease                                               [ ] Unexplained or recurrent                     (1 Point)           (in the previous month)                               (1 Point)  [x ] Abnormal pulmonary function test                     (1 Point)                 SURGERY RELATED RISK FACTORS  [ ] Acute myocardial infarction                              (1 Point)               [ ]  Section                                             (1 Point)  [ ] Congestive heart failure (in the previous month)  (1 Point)      [x ] Minor surgery                                                  (1 Point)   [ ] Inflammatory bowel disease                             (1 Point)               [ ] Arthroscopic surgery                                        (2 Points)  [ ] Central venous access                                      (2 Points)                [ ] General surgery lasting more than 45 minutes (2 points)  [ ] Malignancy- Present or previous                   (2 Points)                [ ] Elective arthroplasty                                         (5 points)    [ ] Stroke (in the previous month)                          (5 Points)                                                                                                                                                           HEMATOLOGY RELATED FACTORS                                                 TRAUMA RELATED RISK FACTORS  [ ] Prior episodes of VTE                                     (3 Points)                [ ] Fracture of the hip, pelvis, or leg                       (5 Points)  [ ] Positive family history for VTE                         (3 Points)             [ ] Acute spinal cord injury (in the previous month)  (5 Points)  [ ] Prothrombin 58343 A                                     (3 Points)               [ ] Paralysis  (less than 1 month)                             (5 Points)  [ ] Factor V Leiden                                             (3 Points)                  [ ] Multiple Trauma within 1 month                        (5 Points)  [ ] Lupus anticoagulants                                     (3 Points)                                                           [ ] Anticardiolipin antibodies                               (3 Points)                                                       [ ] High homocysteine in the blood                      (3 Points)                                             [ ] Other congenital or acquired thrombophilia      (3 Points)                                                [ ] Heparin induced thrombocytopenia                  (3 Points)                                     Total Score [      5    ]  OPIOID RISK TOOL    SHAY EACH BOX THAT APPLIES AND ADD TOTALS AT THE END    FAMILY HISTORY OF SUBSTANCE ABUSE                 FEMALE         MALE                                                Alcohol                             [  ]1 pt          [  ]3pts                                               Illegal Drugs                     [  ]2 pts        [  ]3pts                                               Rx Drugs                           [  ]4 pts        [  ]4 pts    PERSONAL HISTORY OF SUBSTANCE ABUSE                                                                                          Alcohol                             [  ]3 pts       [  ]3 pts                                               Illegal Drugs                     [  ]4 pts        [  ]4 pts                                               Rx Drugs                           [  ]5 pts        [  ]5 pts    AGE BETWEEN 16-45 YEARS                                      [  ]1 pt         [  ]1 pt    HISTORY OF PREADOLESCENT   SEXUAL ABUSE                                                             [  ]3 pts        [  ]0pts    PSYCHOLOGICAL DISEASE                     ADD, OCD, Bipolar, Schizophrenia        [  ]2 pts         [  ]2 pts                      Depression                                               [  ]1 pt           [  ]1 pt           SCORING TOTAL   (add numbers and type here)              ( 0)                                     A score of 3 or lower indicated LOW risk for future opioid abuse  A score of 4 to 7 indicated moderate risk for future opioid abuse  A score of 8 or higher indicates a high risk for opioid abuse 68 year old male with PMH of smoking 1 1/2 pack a day, and  HTN, COPD, Emphysema, Tremors here for PST with  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 No significant stenosis or embolic occlusion. He reports a mild headache on the left side of his head occasionally otherwise, Denies dizziness, LOC, lightheadedness or recent falls,  he is now scheduled for a Cerebral angiogram 24 by dr. Samuel.     medications reviewed, instructions given on what medications to take and what not to take. Asked the patient to take the Blood pressure medication/ heart medication or any other important meds with a sip of water in the AM of surgery ( Propranolol, Buspar, Lamictal, Celexa and Ellipta) All other meds can be continued till the night before surgery.    1-3-24: abnormal labs were emailed to dr. samuel's office. E.Verghese NP-C       CAPRINI VTE 2.0 SCORE [CLOT updated 2019]    AGE RELATED RISK FACTORS                                                       MOBILITY RELATED FACTORS  [ ] Age 41-60 years                                            (1 Point)                    [ ] Bed rest                                                        (1 Point)  x[ ] Age: 61-74 years                                           (2 Points)                  [ ] Plaster cast                                                   (2 Points)  [ ] Age= 75 years                                              (3 Points)                    [ ] Bed bound for more than 72 hours                 (2 Points)    DISEASE RELATED RISK FACTORS                                               GENDER SPECIFIC FACTORS  [ ] Edema in the lower extremities                       (1 Point)              [ ] Pregnancy                                                     (1 Point)  [ ] Varicose veins                                               (1 Point)                     [ ] Post-partum < 6 weeks                                   (1 Point)             [ ] BMI > 25 Kg/m2                                            (1 Point)                     [ ] Hormonal therapy  or oral contraception          (1 Point)                 [ ] Sepsis (in the previous month)                        (1 Point)               [ ] History of pregnancy complications                 (1 point)  [ x] Pneumonia or serious lung disease                                               [ ] Unexplained or recurrent                     (1 Point)           (in the previous month)                               (1 Point)  [x ] Abnormal pulmonary function test                     (1 Point)                 SURGERY RELATED RISK FACTORS  [ ] Acute myocardial infarction                              (1 Point)               [ ]  Section                                             (1 Point)  [ ] Congestive heart failure (in the previous month)  (1 Point)      [x ] Minor surgery                                                  (1 Point)   [ ] Inflammatory bowel disease                             (1 Point)               [ ] Arthroscopic surgery                                        (2 Points)  [ ] Central venous access                                      (2 Points)                [ ] General surgery lasting more than 45 minutes (2 points)  [ ] Malignancy- Present or previous                   (2 Points)                [ ] Elective arthroplasty                                         (5 points)    [ ] Stroke (in the previous month)                          (5 Points)                                                                                                                                                           HEMATOLOGY RELATED FACTORS                                                 TRAUMA RELATED RISK FACTORS  [ ] Prior episodes of VTE                                     (3 Points)                [ ] Fracture of the hip, pelvis, or leg                       (5 Points)  [ ] Positive family history for VTE                         (3 Points)             [ ] Acute spinal cord injury (in the previous month)  (5 Points)  [ ] Prothrombin 67970 A                                     (3 Points)               [ ] Paralysis  (less than 1 month)                             (5 Points)  [ ] Factor V Leiden                                             (3 Points)                  [ ] Multiple Trauma within 1 month                        (5 Points)  [ ] Lupus anticoagulants                                     (3 Points)                                                           [ ] Anticardiolipin antibodies                               (3 Points)                                                       [ ] High homocysteine in the blood                      (3 Points)                                             [ ] Other congenital or acquired thrombophilia      (3 Points)                                                [ ] Heparin induced thrombocytopenia                  (3 Points)                                     Total Score [      5    ]  OPIOID RISK TOOL    SHAY EACH BOX THAT APPLIES AND ADD TOTALS AT THE END    FAMILY HISTORY OF SUBSTANCE ABUSE                 FEMALE         MALE                                                Alcohol                             [  ]1 pt          [  ]3pts                                               Illegal Drugs                     [  ]2 pts        [  ]3pts                                               Rx Drugs                           [  ]4 pts        [  ]4 pts    PERSONAL HISTORY OF SUBSTANCE ABUSE                                                                                          Alcohol                             [  ]3 pts       [  ]3 pts                                               Illegal Drugs                     [  ]4 pts        [  ]4 pts                                               Rx Drugs                           [  ]5 pts        [  ]5 pts    AGE BETWEEN 16-45 YEARS                                      [  ]1 pt         [  ]1 pt    HISTORY OF PREADOLESCENT   SEXUAL ABUSE                                                             [  ]3 pts        [  ]0pts    PSYCHOLOGICAL DISEASE                     ADD, OCD, Bipolar, Schizophrenia        [  ]2 pts         [  ]2 pts                      Depression                                               [  ]1 pt           [  ]1 pt           SCORING TOTAL   (add numbers and type here)              ( 0)                                     A score of 3 or lower indicated LOW risk for future opioid abuse  A score of 4 to 7 indicated moderate risk for future opioid abuse  A score of 8 or higher indicates a high risk for opioid abuse

## 2024-01-09 ENCOUNTER — TRANSCRIPTION ENCOUNTER (OUTPATIENT)
Age: 69
End: 2024-01-09

## 2024-01-09 ENCOUNTER — APPOINTMENT (OUTPATIENT)
Dept: NEUROSURGERY | Facility: HOSPITAL | Age: 69
End: 2024-01-09

## 2024-01-09 ENCOUNTER — OUTPATIENT (OUTPATIENT)
Dept: OUTPATIENT SERVICES | Facility: HOSPITAL | Age: 69
LOS: 1 days | End: 2024-01-09
Payer: MEDICARE

## 2024-01-09 VITALS
SYSTOLIC BLOOD PRESSURE: 105 MMHG | HEART RATE: 57 BPM | OXYGEN SATURATION: 96 % | DIASTOLIC BLOOD PRESSURE: 58 MMHG | TEMPERATURE: 98 F | RESPIRATION RATE: 15 BRPM

## 2024-01-09 VITALS
HEART RATE: 56 BPM | RESPIRATION RATE: 15 BRPM | OXYGEN SATURATION: 94 % | DIASTOLIC BLOOD PRESSURE: 59 MMHG | SYSTOLIC BLOOD PRESSURE: 98 MMHG

## 2024-01-09 DIAGNOSIS — I67.1 CEREBRAL ANEURYSM, NONRUPTURED: ICD-10-CM

## 2024-01-09 DIAGNOSIS — J90 PLEURAL EFFUSION, NOT ELSEWHERE CLASSIFIED: ICD-10-CM

## 2024-01-09 PROCEDURE — 36224 PLACE CATH CAROTD ART: CPT | Mod: 50

## 2024-01-09 PROCEDURE — 36224 PLACE CATH CAROTD ART: CPT

## 2024-01-09 PROCEDURE — C1894: CPT

## 2024-01-09 PROCEDURE — 36225 PLACE CATH SUBCLAVIAN ART: CPT | Mod: 59,LT

## 2024-01-09 PROCEDURE — C1887: CPT

## 2024-01-09 PROCEDURE — 36226 PLACE CATH VERTEBRAL ART: CPT | Mod: RT

## 2024-01-09 PROCEDURE — C1769: CPT

## 2024-01-09 PROCEDURE — C1760: CPT

## 2024-01-09 PROCEDURE — 36227 PLACE CATH XTRNL CAROTID: CPT

## 2024-01-09 PROCEDURE — 76377 3D RENDER W/INTRP POSTPROCES: CPT | Mod: 26

## 2024-01-09 PROCEDURE — 36225 PLACE CATH SUBCLAVIAN ART: CPT

## 2024-01-09 PROCEDURE — 36227 PLACE CATH XTRNL CAROTID: CPT | Mod: 50

## 2024-01-09 PROCEDURE — 36226 PLACE CATH VERTEBRAL ART: CPT

## 2024-01-09 RX ORDER — SODIUM CHLORIDE 9 MG/ML
1000 INJECTION INTRAMUSCULAR; INTRAVENOUS; SUBCUTANEOUS
Refills: 0 | Status: DISCONTINUED | OUTPATIENT
Start: 2024-01-09 | End: 2024-01-23

## 2024-01-09 NOTE — DISCHARGE NOTE NURSING/CASE MANAGEMENT/SOCIAL WORK - NSDCPEFALRISK_GEN_ALL_CORE
For information on Fall & Injury Prevention, visit: [email or web address]://www.[email or web address]/news/fall-prevention-protects-and-maintains-health-and-mobility OR  https://www.[facility]/news/fall-prevention-tips-to-avoid-injury OR  https://www.cdc.gov/steadi/patient.html For information on Fall & Injury Prevention, visit: https://www.[facility]/news/fall-prevention-protects-and-maintains-health-and-mobility OR  https://www.[facility]/news/fall-prevention-tips-to-avoid-injury OR  https://www.cdc.gov/steadi/patient.html

## 2024-01-09 NOTE — DISCHARGE NOTE NURSING/CASE MANAGEMENT/SOCIAL WORK - PATIENT PORTAL LINK FT
You can access the FollowMyHealth Patient Portal offered by [facility] by registering at the following website: [email or web address]. By joining [facility]’s FollowMyHealth portal, you will also be able to view your health information using other applications (apps) compatible with our system. You can access the FollowMyHealth Patient Portal offered by [facility] by registering at the following website: [email or web address]. By joining [employer]’s FollowMyHealth portal, you will also be able to view your health information using other applications (apps) compatible with our system.

## 2024-01-09 NOTE — ASU DISCHARGE PLAN (ADULT/PEDIATRIC) - NS MD DC FALL RISK RISK
For information on Fall & Injury Prevention, visit: [email or web address]://www.[facility]/news/fall-prevention-protects-and-maintains-health-and-mobility OR  https://www.[facility]/news/fall-prevention-tips-[email or web address]-injury OR  https://www.cdc.gov/steadi/patient.html For information on Fall & Injury Prevention, visit: https://www.[facility]/news/fall-prevention-protects-and-maintains-health-and-mobility OR  https://www.[facility]/news/fall-prevention-tips-to-avoid-injury OR  https://www.cdc.gov/steadi/patient.html

## 2024-01-09 NOTE — CHART NOTE - NSCHARTNOTEFT_GEN_A_CORE
Neurointerventional Surgery Post Procedure Note    Procedure: Selective Cerebral Angiography     Pre- Procedure Diagnosis: b/l cavernous ICA aneurysms   Post- Procedure Diagnosis:     : Dr. Samuel  Physician Assistant: Fabi Sterling  Nurse Practitioner: Shi Liz    Nurse: Rodney Glynn Jeannette Cruz  Anesthesiologist: LAUREN Castro  Radiology Tech: Doug Buck Evangelia Manolaki     Sheath:  5 Swedish Sheath    I/Os: estimated blood loss less than 20cc,  IV fluids cc, Urine output 0cc, Contrast: Omnipaque 240   cc    Vitals:  BP   HR   Spo2  %    Preliminary Report:  Under a 5 Swedish long sheath via the right groin under MAC sedation via left vertebral artery, left internal carotid artery, left external carotid artery, right vertebral artery, right internal carotid artery, right external carotid artery, a selective cerebral angiography  was performed and reveals       . ( Official note to follow).    Patient tolerated procedure well.  Patient remains hemodynamically stable, no change in neurological status compared to baseline.  Results were discussed with patient, patient's family and Neurosurgery.  Right groin sheath  was discontinued using Vascade closure device at ___. Hemostasis was obtained with approximately __ minutes of manual compression.     No active bleeding, no hematoma, no ecchymosis.   Quick clot and safeguard balloon dressing applied at ___  Patient transferred to recovery room in stable condition. Neurointerventional Surgery Post Procedure Note    Procedure: Selective Cerebral Angiography     Pre- Procedure Diagnosis: b/l cavernous ICA aneurysms   Post- Procedure Diagnosis:     : Dr. Samuel  Physician Assistant: Fabi Sterling  Nurse Practitioner: Shi Liz    Nurse: Rodney Glynn Jeannette Cruz  Anesthesiologist: LAUREN Castro  Radiology Tech: Doug Buck Evangelia Manolaki     Sheath:  5 Nigerian Sheath    I/Os: estimated blood loss less than 20cc,  IV fluids cc, Urine output 0cc, Contrast: Omnipaque 240   cc    Vitals:  BP   HR   Spo2  %    Preliminary Report:  Under a 5 Nigerian long sheath via the right groin under MAC sedation via left vertebral artery, left internal carotid artery, left external carotid artery, right vertebral artery, right internal carotid artery, right external carotid artery, a selective cerebral angiography  was performed and reveals       . ( Official note to follow).    Patient tolerated procedure well.  Patient remains hemodynamically stable, no change in neurological status compared to baseline.  Results were discussed with patient, patient's family and Neurosurgery.  Right groin sheath  was discontinued using Vascade closure device at ___. Hemostasis was obtained with approximately __ minutes of manual compression.     No active bleeding, no hematoma, no ecchymosis.   Quick clot and safeguard balloon dressing applied at ___  Patient transferred to recovery room in stable condition. Neurointerventional Surgery Post Procedure Note    Procedure: Selective Cerebral Angiography     Pre- Procedure Diagnosis: b/l cavernous ICA aneurysms   Post- Procedure Diagnosis: small RA2 aneurysm, L cavernous carotid aneurysm, L subclavian steal syndrome     : Dr. Samuel  Physician Assistant: Fabi Sterling  Nurse Practitioner: Shi Liz    Nurse: Rodney Glynn Jeannette Cruz  Anesthesiologist: LAUREN Castro  Radiology Tech: Doug Buck Evangelia Manolaki   Fellow: Miguel Thomas     Sheath:  5 [place] Sheath    I/Os: estimated blood loss less than 20cc,  IV fluids 250cc, Urine output 0cc, Contrast: Omnipaque 240  124 cc    Vitals:  /65   HR 58  Spo2 98 %    Preliminary Report:  Under a 5 [place] long sheath via the right groin under MAC sedation via left internal carotid artery, left external carotid artery, right vertebral artery, right internal carotid artery, right external carotid artery, a selective cerebral angiography  was performed and reveals small RA2 aneurysm, L cavernous carotid aneurysm, L subclavian steal syndrome. ( Official note to follow).    Patient tolerated procedure well.  Patient remains hemodynamically stable, no change in neurological status compared to baseline.  Results were discussed with patient, patient's family and Neurosurgery.  Right groin sheath  was discontinued using Vascade closure device at 1056. Hemostasis was obtained with approximately 9 minutes of manual compression.     No active bleeding, no hematoma, no ecchymosis.   Quick clot and safeguard balloon dressing applied at 1105.  Patient transferred to recovery room in stable condition. Neurointerventional Surgery Post Procedure Note    Procedure: Selective Cerebral Angiography     Pre- Procedure Diagnosis: b/l cavernous ICA aneurysms   Post- Procedure Diagnosis: small RA2 aneurysm, L cavernous carotid aneurysm, L subclavian steal syndrome     : Dr. Samuel  Physician Assistant: Fabi Sterling  Nurse Practitioner: Shi Liz    Nurse: Rodney Glynn Jeannette Cruz  Anesthesiologist: LAUREN Castro  Radiology Tech: Doug Buck Evangelia Manolaki   Fellow: Miguel Thomas     Sheath:  5 Kisw[place]ili Sheath    I/Os: estimated blood loss less than 20cc,  IV fluids 250cc, Urine output 0cc, Contrast: Omnipaque 240  124 cc    Vitals:  /65   HR 58  Spo2 98 %    Preliminary Report:  Under a 5 Kisw[place]ili long sheath via the right groin under MAC sedation via left internal carotid artery, left external carotid artery, right vertebral artery, right internal carotid artery, right external carotid artery, a selective cerebral angiography  was performed and reveals small RA2 aneurysm, L cavernous carotid aneurysm, L subclavian steal syndrome. ( Official note to follow).    Patient tolerated procedure well.  Patient remains hemodynamically stable, no change in neurological status compared to baseline.  Results were discussed with patient, patient's family and Neurosurgery.  Right groin sheath  was discontinued using Vascade closure device at 1056. Hemostasis was obtained with approximately 9 minutes of manual compression.     No active bleeding, no hematoma, no ecchymosis.   Quick clot and safeguard balloon dressing applied at 1105.  Patient transferred to recovery room in stable condition.

## 2024-01-09 NOTE — ASU DISCHARGE PLAN (ADULT/PEDIATRIC) - CARE PROVIDER_API CALL
Durga Samuel [facility]  Neurosurgery  [address] [place] [id number]  Phone: [phone]  Fax: [phone]  Follow Up Time: 2 weeks   Durga Samuel [facility]  Neurosurgery  [address] [place] [address]  Phone: [phone]  Fax: [phone]  Follow Up Time: 2 weeks

## 2024-01-09 NOTE — ASU DISCHARGE PLAN (ADULT/PEDIATRIC) - PROVIDER TOKENS
PROVIDER:[TOKEN:[[id number]],FOLLOWUP:[2 weeks]] PROVIDER:[TOKEN:[[id number]],FOLLOWUP:[2 weeks]]

## 2024-01-09 NOTE — CHART NOTE - NSCHARTNOTEFT_GEN_A_CORE
Neurointerventional Surgery  Pre-Procedure Note       HPI:  68M with PMH smoking 1.5 PPD, HTN, COPD, emphysema who presents for cerebral angiogram. Patient was experiencing hand tremors, and as part of workup patient had MRI / MRA which showed b/l cavernous ICA aneurysms. Patient was referred to Dr. Samuel for further w/u and was recommended for cerebral angiogram. Patient presents today for cerebral angiogram to further assess vasculature. Patient has no acute complaints, denies HA, weakness, numbness, tingling, dizziness, CP, SOB, N/V.       Allergies: No Known Allergies      PAST MEDICAL & SURGICAL HISTORY:  BPH (benign prostatic hyperplasia)  Depression  Urinary retention, gonzalez catheter 2/12/1inserted  Cervical radiculopathy  COPD, severe  Emphysema/COPD  Tremors of nervous system  No significant past surgical history      FAMILY HISTORY:  FH: emphysema (Father)  FH: throat cancer (Mother)      Physical Exam:  Constitutional: NAD, lying in bed  Neuro  * Mental Status:  GCS 15: Awake, alert, oriented to conversation. No aphasia or difficulty speaking. No dysarthria. Able to name objects and their function.  * Cranial Nerves: Cnii-Cnxii grossly intact. PERRL, EOMI, tongue midline, no gaze deviation  * Motor: RUE 5/5, LUE 5/5, RLE 5/5, LLE 5/5, no drift or dysmetria  * Sensory: Sensation intact to light touch  * Reflexes: not assessed   Cardiovascular: Regular rate and rhythm.  Eyes: See neurologic examination with detailed examination of eyes.  ENT: No JVD, Trachea Midline  Respiratory: non labored breathing   Gastrointestinal: Soft, nontender, nondistended.  Genitourinary: [ ] Gonzalez, [ x ] No Gonzalez.   Musculoskeletal: No muscle wasting noted, (See neurologic assessment for full muscle strength assessment)   Skin:  no wounds, no redness, no abrasions noted  Hematologic / Lymph / Immunologic: No bleeding from IV sites or wounds      NIH SS:  DATE: 1/9/24   TIME:  1A: Level of consciousness (0-3): 0  1B: Questions (0-2): 0    1C: Commands (0-2): 0  2: Gaze (0-2): 0  3: Visual fields (0-3): 0  4: Facial palsy (0-3): 0  MOTOR:  5A: Left arm motor drift (0-4): 0  5B: Right arm motor drift (0-4): 0  6A: Left leg motor drift (0-4): 0  6B: Right leg motor drift (0-4): 0  7: Limb ataxia (0-2): 0  SENSORY:  8: Sensation (0-2): 0  SPEECH:  9: Language (0-3): 0  10: Dysarthria (0-2): 0  EXTINCTION:  11: Extinction/inattention (0-2): 0    TOTAL SCORE:       Labs:   1/3/24: WBC 11.99, hgb 14.2, hct 43.1, platelets 506  1/3/24: PTT 34.6, INR 1.06  1/3/24: Na 141, K 4.6, Cl 102, CO2 27, BUN 20.8, Cr 1.24, glucose 87       Assessment/Plan:   This is a 68y  year old Male  presents with b/l cavernous ICA aneurysms. Patient presents to neuro-IR for selective cerebral angiography.     Procedure, goals, risks, benefits and alternatives  were discussed with patient.  All questions were answered.  Risks include but are not limited to stroke, vessel injury, hemorrhage, and or groin hematoma.  Patient demonstrates understanding  of all risks involved with this procedure and wishes to continue.   Appropriate  consent was obtained from patient and consent is in the patient's chart. Neurointerventional Surgery  Pre-Procedure Note       HPI:  68M with PMH smoking 1.5 PPD, HTN, COPD, emphysema who presents for cerebral angiogram. Patient was experiencing hand tremors, and as part of workup patient had MRI / MRA which showed b/l cavernous ICA aneurysms. Patient was referred to Dr. Samuel for further w/u and was recommended for cerebral angiogram. Patient presents today for cerebral angiogram to further assess vasculature. Patient has no acute complaints, denies HA, weakness, numbness, tingling, dizziness, CP, SOB, N/V.       Allergies: No Known Allergies      PAST MEDICAL & SURGICAL HISTORY:  BPH (benign prostatic hyperplasia)  Depression  Urinary retention, gonzalez catheter 2/12/1inserted  Cervical radiculopathy  COPD, severe  Emphysema/COPD  Tremors of nervous system  No significant past surgical history      FAMILY HISTORY:  FH: emphysema (Father)  FH: throat cancer (Mother)      Physical Exam:  Constitutional: NAD, lying in bed  Neuro  * Mental Status:  GCS 15: Awake, alert, oriented to conversation. No aphasia or difficulty speaking. No dysarthria. Able to name objects and their function.  * Cranial Nerves: Cnii-Cnxii grossly intact. PERRL, EOMI, tongue midline, no gaze deviation  * Motor: RUE 5/5, LUE 5/5, RLE 5/5, LLE 5/5, no drift or dysmetria, b/l UE tremors   * Sensory: Sensation intact to light touch  * Reflexes: not assessed   Cardiovascular: Regular rate and rhythm.  Eyes: See neurologic examination with detailed examination of eyes.  ENT: No JVD, Trachea Midline  Respiratory: non labored breathing   Gastrointestinal: Soft, nontender, nondistended.  Genitourinary: [ ] Gonzalez, [ x ] No Gonzalez.   Musculoskeletal: No muscle wasting noted, (See neurologic assessment for full muscle strength assessment)   Skin:  no wounds, no redness, no abrasions noted  Hematologic / Lymph / Immunologic: No bleeding from IV sites or wounds      NIH SS:  DATE: 1/9/24   TIME: 0940  1A: Level of consciousness (0-3): 0  1B: Questions (0-2): 0    1C: Commands (0-2): 0  2: Gaze (0-2): 0  3: Visual fields (0-3): 0  4: Facial palsy (0-3): 0  MOTOR:  5A: Left arm motor drift (0-4): 0  5B: Right arm motor drift (0-4): 0  6A: Left leg motor drift (0-4): 0  6B: Right leg motor drift (0-4): 0  7: Limb ataxia (0-2): 0  SENSORY:  8: Sensation (0-2): 0  SPEECH:  9: Language (0-3): 0  10: Dysarthria (0-2): 0  EXTINCTION:  11: Extinction/inattention (0-2): 0    TOTAL SCORE: 0      Labs:   1/3/24: WBC 11.99, hgb 14.2, hct 43.1, platelets 506  1/3/24: PTT 34.6, INR 1.06  1/3/24: Na 141, K 4.6, Cl 102, CO2 27, BUN 20.8, Cr 1.24, glucose 87       Assessment/Plan:   This is a 68y  year old Male  presents with b/l cavernous ICA aneurysms. Patient presents to neuro-IR for selective cerebral angiography.     Procedure, goals, risks, benefits and alternatives  were discussed with patient.  All questions were answered.  Risks include but are not limited to stroke, vessel injury, hemorrhage, and or groin hematoma.  Patient demonstrates understanding  of all risks involved with this procedure and wishes to continue.   Appropriate  consent was obtained from patient and consent is in the patient's chart.

## 2024-02-14 ENCOUNTER — APPOINTMENT (OUTPATIENT)
Dept: NEUROSURGERY | Facility: CLINIC | Age: 69
End: 2024-02-14
Payer: MEDICARE

## 2024-02-14 VITALS
HEIGHT: 70 IN | OXYGEN SATURATION: 96 % | HEART RATE: 67 BPM | WEIGHT: 130 LBS | DIASTOLIC BLOOD PRESSURE: 78 MMHG | BODY MASS INDEX: 18.61 KG/M2 | TEMPERATURE: 97.9 F | SYSTOLIC BLOOD PRESSURE: 147 MMHG

## 2024-02-14 PROCEDURE — 99215 OFFICE O/P EST HI 40 MIN: CPT

## 2024-02-14 NOTE — PHYSICAL EXAM
[General Appearance - Alert] : alert [General Appearance - In No Acute Distress] : in no acute distress [Oriented To Time, Place, And Person] : oriented to person, place, and time [Cranial Nerves Oculomotor (III)] : extraocular motion intact [Cranial Nerves Facial (VII)] : face symmetrical [Cranial Nerves Vestibulocochlear (VIII)] : hearing was intact bilaterally [Cranial Nerves Accessory (XI - Cranial And Spinal)] : head turning and shoulder shrug symmetric [Motor Handedness Right-Handed] : the patient is right hand dominant [Sensation Tactile Decrease] : light touch was intact [Abnormal Walk] : normal gait [Balance] : balance was intact [Extraocular Movements] : extraocular movements were intact [Hearing Threshold Finger Rub Not [place]] : hearing was normal [Neck Appearance] : the appearance of the neck was normal [] : no respiratory distress [Heart Rate And Rhythm] : heart rate was normal and rhythm regular [Motor Tone] : muscle strength and tone were normal

## 2024-02-16 NOTE — HISTORY OF PRESENT ILLNESS
[de-identified] : Mr. Stephenson is a 68 year old RIGHT HANDED MALE who presents s/p cerebral angiogram for follow up. Patient had incidental cerebral aneurysms recently discovered on imaging by neurology due to hand tremors. 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 No significant stenosis or embolic occlusion. Patient underwent diagnostic cerebral angiogram which confirmed R A2 aneurysm 2.4mm and L cavernous ICA aneurysm 5x6 mm.  Patient reports smoking 1 1/2 pack a day, history or HTN with no reported family history of cerebral aneurysms. No reported allergies and patient denies taking any blood thinners.

## 2024-02-16 NOTE — PLAN
[FreeTextEntry1] : 68M with PMH COPD, cerebral aneurysm who presents for follow up s/p diagnostic angiogram which showed R A2 aneurysm 2.4 mm and L cavernous ICA aneurysm 5x6 mm.   Plan: - Surgical intervention for R A2 aneurysm, will follow up in 1-2 weeks for surgical planning likely April 2024 - Medical clearance prior to OR via PMD Dr. Coleman  - Monitoring of L cavernous ICA aneurysm

## 2024-02-16 NOTE — END OF VISIT
[FreeTextEntry3] : Mr. Stephenson is a 68-year-old right-handed male who is seen in follow-up today.  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  I informed him of the rupture risk of both aneurysms and treatment modalities.  At this time I feel that his 6 mm left cavernous carotid aneurysm is low risk of causing him symptoms and we will continue to monitor it.  I informed him that his pericallosal artery aneurysm is in a location that may cause subarachnoid hemorrhage and although it is small, the natural history of pericallosal artery aneurysms is not well-delineated in the medical literature.  Additionally multiple observational studies have demonstrated smaller size at the time of rupture for pericallosal artery aneurysms.  This, along with his extensive smoking history, puts him at risk of subarachnoid hemorrhage which I felt was greater during his lifetime than the risk of surgical treatment.  Therefore I informed the patient of the risk, benefits and alternatives of surgical treatment of his pericallosal artery aneurysm.  After weighing the options carefully we agreed to proceed with surgical clipping which will be tentatively scheduled for April 2024. [Time Spent: ___ minutes] : I have spent [unfilled] minutes of time on the encounter.

## 2024-02-17 PROBLEM — J44.9 CHRONIC OBSTRUCTIVE PULMONARY DISEASE, UNSPECIFIED: Chronic | Status: ACTIVE | Noted: 2024-01-03

## 2024-02-17 PROBLEM — J43.9 EMPHYSEMA, UNSPECIFIED: Chronic | Status: ACTIVE | Noted: 2024-01-03

## 2024-02-17 PROBLEM — R25.1 TREMOR, UNSPECIFIED: Chronic | Status: ACTIVE | Noted: 2024-01-03

## 2024-03-25 ENCOUNTER — OUTPATIENT (OUTPATIENT)
Dept: OUTPATIENT SERVICES | Facility: HOSPITAL | Age: 69
LOS: 1 days | End: 2024-03-25
Payer: MEDICARE

## 2024-03-25 VITALS
WEIGHT: 131.84 LBS | RESPIRATION RATE: 18 BRPM | HEIGHT: 70 IN | DIASTOLIC BLOOD PRESSURE: 68 MMHG | HEART RATE: 59 BPM | SYSTOLIC BLOOD PRESSURE: 118 MMHG | TEMPERATURE: 99 F | OXYGEN SATURATION: 98 %

## 2024-03-25 DIAGNOSIS — I67.1 CEREBRAL ANEURYSM, NONRUPTURED: ICD-10-CM

## 2024-03-25 DIAGNOSIS — Z29.9 ENCOUNTER FOR PROPHYLACTIC MEASURES, UNSPECIFIED: ICD-10-CM

## 2024-03-25 DIAGNOSIS — Z01.818 ENCOUNTER FOR OTHER PREPROCEDURAL EXAMINATION: ICD-10-CM

## 2024-03-25 LAB
A1C WITH ESTIMATED AVERAGE GLUCOSE RESULT: 5.4 % — SIGNIFICANT CHANGE UP (ref 4–5.6)
ALBUMIN SERPL ELPH-MCNC: 3.9 G/DL — SIGNIFICANT CHANGE UP (ref 3.3–5.2)
ALP SERPL-CCNC: 101 U/L — SIGNIFICANT CHANGE UP (ref 40–120)
ALT FLD-CCNC: 8 U/L — SIGNIFICANT CHANGE UP
ANION GAP SERPL CALC-SCNC: 11 MMOL/L — SIGNIFICANT CHANGE UP (ref 5–17)
APTT BLD: 38.5 SEC — HIGH (ref 24.5–35.6)
AST SERPL-CCNC: 17 U/L — SIGNIFICANT CHANGE UP
BASOPHILS # BLD AUTO: 0.14 K/UL — SIGNIFICANT CHANGE UP (ref 0–0.2)
BASOPHILS NFR BLD AUTO: 1.5 % — SIGNIFICANT CHANGE UP (ref 0–2)
BILIRUB SERPL-MCNC: 0.4 MG/DL — SIGNIFICANT CHANGE UP (ref 0.4–2)
BLD GP AB SCN SERPL QL: SIGNIFICANT CHANGE UP
BUN SERPL-MCNC: 18.2 MG/DL — SIGNIFICANT CHANGE UP (ref 8–20)
CALCIUM SERPL-MCNC: 8.7 MG/DL — SIGNIFICANT CHANGE UP (ref 8.4–10.5)
CHLORIDE SERPL-SCNC: 104 MMOL/L — SIGNIFICANT CHANGE UP (ref 96–108)
CO2 SERPL-SCNC: 25 MMOL/L — SIGNIFICANT CHANGE UP (ref 22–29)
CREAT SERPL-MCNC: 1.37 MG/DL — HIGH (ref 0.5–1.3)
EGFR: 56 ML/MIN/1.73M2 — LOW
EOSINOPHIL # BLD AUTO: 0.24 K/UL — SIGNIFICANT CHANGE UP (ref 0–0.5)
EOSINOPHIL NFR BLD AUTO: 2.5 % — SIGNIFICANT CHANGE UP (ref 0–6)
ESTIMATED AVERAGE GLUCOSE: 108 MG/DL — SIGNIFICANT CHANGE UP (ref 68–114)
GLUCOSE SERPL-MCNC: 85 MG/DL — SIGNIFICANT CHANGE UP (ref 70–99)
HCT VFR BLD CALC: 42.9 % — SIGNIFICANT CHANGE UP (ref 39–50)
HGB BLD-MCNC: 14.2 G/DL — SIGNIFICANT CHANGE UP (ref 13–17)
IMM GRANULOCYTES NFR BLD AUTO: 0.2 % — SIGNIFICANT CHANGE UP (ref 0–0.9)
INR BLD: 0.95 RATIO — SIGNIFICANT CHANGE UP (ref 0.85–1.18)
LYMPHOCYTES # BLD AUTO: 2.95 K/UL — SIGNIFICANT CHANGE UP (ref 1–3.3)
LYMPHOCYTES # BLD AUTO: 30.8 % — SIGNIFICANT CHANGE UP (ref 13–44)
MCHC RBC-ENTMCNC: 30.1 PG — SIGNIFICANT CHANGE UP (ref 27–34)
MCHC RBC-ENTMCNC: 33.1 GM/DL — SIGNIFICANT CHANGE UP (ref 32–36)
MCV RBC AUTO: 91.1 FL — SIGNIFICANT CHANGE UP (ref 80–100)
MONOCYTES # BLD AUTO: 0.92 K/UL — HIGH (ref 0–0.9)
MONOCYTES NFR BLD AUTO: 9.6 % — SIGNIFICANT CHANGE UP (ref 2–14)
NEUTROPHILS # BLD AUTO: 5.31 K/UL — SIGNIFICANT CHANGE UP (ref 1.8–7.4)
NEUTROPHILS NFR BLD AUTO: 55.4 % — SIGNIFICANT CHANGE UP (ref 43–77)
PLATELET # BLD AUTO: 338 K/UL — SIGNIFICANT CHANGE UP (ref 150–400)
POTASSIUM SERPL-MCNC: 4.7 MMOL/L — SIGNIFICANT CHANGE UP (ref 3.5–5.3)
POTASSIUM SERPL-SCNC: 4.7 MMOL/L — SIGNIFICANT CHANGE UP (ref 3.5–5.3)
PROT SERPL-MCNC: 6.5 G/DL — LOW (ref 6.6–8.7)
PROTHROM AB SERPL-ACNC: 10.6 SEC — SIGNIFICANT CHANGE UP (ref 9.5–13)
RBC # BLD: 4.71 M/UL — SIGNIFICANT CHANGE UP (ref 4.2–5.8)
RBC # FLD: 14 % — SIGNIFICANT CHANGE UP (ref 10.3–14.5)
SODIUM SERPL-SCNC: 140 MMOL/L — SIGNIFICANT CHANGE UP (ref 135–145)
WBC # BLD: 9.58 K/UL — SIGNIFICANT CHANGE UP (ref 3.8–10.5)
WBC # FLD AUTO: 9.58 K/UL — SIGNIFICANT CHANGE UP (ref 3.8–10.5)

## 2024-03-25 PROCEDURE — 93010 ELECTROCARDIOGRAM REPORT: CPT

## 2024-03-25 PROCEDURE — G0463: CPT

## 2024-03-25 PROCEDURE — 93005 ELECTROCARDIOGRAM TRACING: CPT

## 2024-03-25 RX ORDER — CEFAZOLIN SODIUM 1 G
2000 VIAL (EA) INJECTION ONCE
Refills: 0 | Status: DISCONTINUED | OUTPATIENT
Start: 2024-04-12 | End: 2024-04-13

## 2024-03-25 RX ORDER — SODIUM CHLORIDE 9 MG/ML
3 INJECTION INTRAMUSCULAR; INTRAVENOUS; SUBCUTANEOUS EVERY 8 HOURS
Refills: 0 | Status: DISCONTINUED | OUTPATIENT
Start: 2024-04-12 | End: 2024-04-15

## 2024-03-25 NOTE — H&P PST ADULT - ALLERGIC/IMMUNOLOGIC
came from transition; report received from transition RN that  had oxygen desaturation during transition and the NICU provider was contacted. [facility] performed [facility]

## 2024-03-25 NOTE — H&P PST ADULT - HISTORY OF PRESENT ILLNESS
Pt is  a 68 year old male seen today pre-op for Right Craniotomy interhemispheric approach  RIGHT HANDED MALE who presents s/p cerebral angiogram for follow up. Patient had incidental cerebral aneurysms recently discovered on imaging by neurology due to hand tremors.  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 No significant stenosis or embolic occlusion.  Patient underwent diagnostic cerebral angiogram which confirmed R A2 aneurysm 2.4mm and L cavernous ICA aneurysm 5x6 mm.   Patient reports smoking 1 1/2 pack a day, history or HTN with no reported family history of cerebral aneurysms. No reported allergies and patient denies taking any blood thinners.  Pt is  a 68 year old male seen today pre-op for Right Craniotomy interhemispheric approach  RIGHT HANDED MALE who presents s/p cerebral angiogram for follow up. Patient had incidental cerebral aneurysms recently discovered on imaging by neurology due to hand tremors.  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  m 5x6 mm.   Patient reports smoking 1 1/2 pack a day, history or HTN with no reported family history of cerebral aneurysms. No reported allergies and patient denies taking any blood thinners.   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 No significant stenosis or embolic occlusion.  Patient underwent diagnostic cerebral angiogram which confirmed R A2 aneurysm 2.4mm and L cavernous ICA aneurys Pt is  a 68 year old male seen today pre-op for Right Craniotomy interhemispheric approach. Pt right hand dominate, is  s/p cerebral angiogram Pt underwent diagnostic cerebral angiogram which confirmed R A2 aneurysm 2.4mm and L cavernous ICA aneurysm  1/9/24. Patient had incidental cerebral aneurysms recently discovered on imaging by neurology due to hand tremors. 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  m 5x6 mm. Pt medical hx includes current every day smoker 1 1/2 pack a day,  HTN(Not on any medication)  with no reported family history of cerebral aneurysms. No reported allergies and patient denies taking any blood thinners.      Pt is  a 68 year old male, Pt right hand dominate, seen today pre-op for Right Craniotomy interhemispheric approach for cerebral aneurisms. Pt reports incidental cerebral aneurysms findings recently discovered on imaging by neurology due to hand tremors. 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 Pt medical hx includes current every day smoker 1 1/2 pack a day,  HTN(Not on any medication), bipolar disorder, depression, anxiety, COPD, cervical radiculopathy.  Pt denies headaches, denies change in vision, denies fever/chills and s/s of symptoms and any other related issues. Pt scheduled for this surgery on 4/12/24 with Dr. Samuel

## 2024-03-25 NOTE — H&P PST ADULT - NSALCOHOLTYPE_GEN__A_CORE_SD
Protocol For Puva: The patient received PUVA. Protocol For Photochemotherapy: Petrolatum And Broad Band Uvb: The patient received Photochemotherapy: Petrolatum and Broad Band UVB. Protocol For Uva1: The patient received UVA1. Protocol: Photochemotherapy: Mineral Oil and NBUVB Protocol For Photochemotherapy For Severe Photoresponsive Dermatoses: Petrolatum And Broad Band Uvb: The patient received Photochemotherapyfor severe photoresponsive dermatoses: Petrolatum and Broad Band UVB requiring at least 4 to 8 hours of care under direct physician supervision. Protocol For Photochemotherapy: Tar And Nbuvb (Goeckerman Treatment): The patient received Photochemotherapy: Tar and NBUVB (Goeckerman treatment). Protocol For Photochemotherapy: Petrolatum And Nbuvb: The patient received Photochemotherapy: Petrolatum and NBUVB (petrolatum applied to all lesions prior to phototherapy). Protocol For Photochemotherapy For Severe Photoresponsive Dermatoses: Petrolatum And Nbuvb: The patient received Photochemotherapy for severe photoresponsive dermatoses: Petrolatum and NBUVB requiring at least 4 to 8 hours of care under direct physician supervision. Detail Level: Generalized Consent: Written consent obtained.  The risks were reviewed with the patient including but not limited to: burn, pigmentary changes, pain, blistering, scabbing, redness, increased risk of skin cancers, and the remote possibility of scarring. Protocol For Nb Uva: The patient received NB UVA. Protocol For Photochemotherapy: Tar And Broad Band Uvb (Goeckerman Treatment): The patient received Photochemotherapy: Tar and Broad Band UVB (Goeckerman treatment). Total Body Time: 2:30 Protocol For Nbuvb: The patient received NBUVB. Comments On Previous Treatment: Per [facility] hold at current dose. Patient comes once a week. Total Body Energy: [id number] Protocol For Photochemotherapy: Baby Oil And Nbuvb: The patient received Photochemotherapy: Baby Oil and NBUVB (baby oil applied to all lesions prior to phototherapy). Protocol For Photochemotherapy For Severe Photoresponsive Dermatoses: Puva: The patient received Photochemotherapy for severe photoresponsive dermatoses: PUVA requiring at least 4 to 8 hours of care under direct physician supervision. Skin Type: II Protocol For Photochemotherapy For Severe Photoresponsive Dermatoses: Tar And Nbuvb (Goeckerman Treatment): The patient received Photochemotherapy for severe photoresponsive dermatoses: Tar and NBUVB (Goeckerman treatment) requiring at least 4 to 8 hours of care under direct physician supervision. Protocol For Broad Band Uvb: The patient received Broad Band UVB. Protocol For Protocol For Photochemotherapy For Severe Photoresponsive Dermatoses: Bath Puva: The patient received Photochemotherapy for severe photoresponsive dermatoses: Bath PUVA requiring at least 4 to 8 hours of care under direct physician supervision. Protocol For Uva: The patient received UVA. Protocol For Photochemotherapy: Mineral Oil And Broad Band Uvb: The patient received Photochemotherapy: Mineral Oil and Broad Band UVB. Protocol For Bath Puva: The patient received Bath PUVA. Treatment Number: 93 Protocol For Photochemotherapy: Triamcinolone Ointment And Nbuvb: The patient received Photochemotherapy: Triamcinolone and NBUVB (triamcinolone ointment applied to all lesions prior to phototherapy). Protocol For Photochemotherapy: Mineral Oil And Nbuvb: The patient received Photochemotherapy: Mineral Oil and NBUVB (mineral oil applied to all lesions prior to phototherapy). Post-Care Instructions: I reviewed with the patient in detail post-care instructions. Patient is to wear sun protection. Patients may expect sunburn like redness, discomfort and scabbing. Protocol For Photochemotherapy For Severe Photoresponsive Dermatoses: Tar And Broad Band Uvb (Goeckerman Treatment): The patient received Photochemotherapy for severe photoresponsive dermatoses: Tar and Broad Band UVB (Goeckerman treatment) requiring at least 4 to 8 hours of care under direct physician supervision. liquor

## 2024-03-25 NOTE — H&P PST ADULT - NSICDXPASTMEDICALHX_GEN_ALL_CORE_FT
PAST MEDICAL HISTORY:  BPH (benign prostatic hyperplasia)     Cervical radiculopathy     COPD, severe     Depression     Emphysema/COPD     History of cerebral aneurysm     Tremors of nervous system     Urinary retention gonzalez catheter 2/12/1inserted     PAST MEDICAL HISTORY:  Bipolar disorder     BPH (benign prostatic hyperplasia)     Cervical radiculopathy     COPD, severe     Depression     Emphysema/COPD     History of cerebral aneurysm     Tremors of nervous system     Urinary retention gonzalez catheter 2/12/1inserted

## 2024-03-25 NOTE — H&P PST ADULT - PATIENT ON (OXYGEN DELIVERY METHOD)
Return to office as needed.    Thank you for allowing Ryan SANDRA CNM and our OB team to participate in your care.  If you have a scheduling or an appointment question please contact [facility] Coordinator at their direct line [phone].   ALL nursing questions or concerns can be directed to your OB nurse at: [phone] - Laura       room air

## 2024-03-25 NOTE — H&P PST ADULT - ASSESSMENT
Pt is  a 68 year old male, Pt right hand dominate, seen today pre-op for Right Craniotomy interhemispheric approach for cerebral aneurisms. Pt reports incidental cerebral aneurysms findings recently discovered on imaging by neurology due to hand tremors. 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 Pt medical hx includes current every day smoker 1 1/2 pack a day,  HTN(Not on any medication), bipolar disorder, depression, anxiety, COPD, cervical radiculopathy. Pt denies headaches, denies change in vision, denies fever/chills and s/s of symptoms and any other related issues. Pt scheduled for this surgery on 24 with Dr. Samuel. Surgery protocol reviewed with Pt today. Pt to follow-up with PCP fro medical evaluation and clearance  CAPRINI VTE 2.0 SCORE [CLOT updated 2019]    AGE RELATED RISK FACTORS                                                       MOBILITY RELATED FACTORS  [ ] Age 41-60 years                                            (1 Point)                    [ ] Bed rest                                                        (1 Point)  [ x] Age: 61-74 years                                           (2 Points)                  [ ] Plaster cast                                                   (2 Points)  [ ] Age= 75 years                                              (3 Points)                    [ ] Bed bound for more than 72 hours                 (2 Points)    DISEASE RELATED RISK FACTORS                                               GENDER SPECIFIC FACTORS  [ ] Edema in the lower extremities                       (1 Point)              [ ] Pregnancy                                                     (1 Point)  [ ] Varicose veins                                               (1 Point)                     [ ] Post-partum < 6 weeks                                   (1 Point)             [x ] BMI > 25 Kg/m2                                            (1 Point)                     [ ] Hormonal therapy  or oral contraception          (1 Point)                 [ ] Sepsis (in the previous month)                        (1 Point)               [ ] History of pregnancy complications                 (1 point)  [ ] Pneumonia or serious lung disease                                               [ ] Unexplained or recurrent                     (1 Point)           (in the previous month)                               (1 Point)  [ ] Abnormal pulmonary function test                     (1 Point)                 SURGERY RELATED RISK FACTORS  [ ] Acute myocardial infarction                              (1 Point)               [ ]  Section                                             (1 Point)  [ ] Congestive heart failure (in the previous month)  (1 Point)      [ ] Minor surgery                                                  (1 Point)   [ ] Inflammatory bowel disease                             (1 Point)               [ ] Arthroscopic surgery                                        (2 Points)  [ ] Central venous access                                      (2 Points)                [x ] General surgery lasting more than 45 minutes (2 points)  [ ] Malignancy- Present or previous                   (2 Points)                [ ] Elective arthroplasty                                         (5 points)    [ ] Stroke (in the previous month)                          (5 Points)                                                                                                                                                           HEMATOLOGY RELATED FACTORS                                                 TRAUMA RELATED RISK FACTORS  [ ] Prior episodes of VTE                                     (3 Points)                [ ] Fracture of the hip, pelvis, or leg                       (5 Points)  [ ] Positive family history for VTE                         (3 Points)             [ ] Acute spinal cord injury (in the previous month)  (5 Points)  [ ] Prothrombin 47160 A                                     (3 Points)               [ ] Paralysis  (less than 1 month)                             (5 Points)  [ ] Factor V Leiden                                             (3 Points)                  [ ] Multiple Trauma within 1 month                        (5 Points)  [ ] Lupus anticoagulants                                     (3 Points)                                                           [ ] Anticardiolipin antibodies                               (3 Points)                                                       [ ] High homocysteine in the blood                      (3 Points)                                             [ ] Other congenital or acquired thrombophilia      (3 Points)                                                [ ] Heparin induced thrombocytopenia                  (3 Points)                                     Total Score [   4      ]   OPIOID RISK TOOL    SHAY EACH BOX THAT APPLIES AND ADD TOTALS AT THE END    FAMILY HISTORY OF SUBSTANCE ABUSE                 FEMALE         MALE                                                Alcohol                             [  ]1 pt          [  ]3pts                                               Illegal Durgs                     [  ]2 pts        [  ]3pts                                               Rx Drugs                           [  ]4 pts        [  ]4 pts    PERSONAL HISTORY OF SUBSTANCE ABUSE                                                                                          Alcohol                             [  ]3 pts       [  ]3 pts                                               Illegal Drugs                     [  ]4 pts        [x  ]4 pts                                               Rx Drugs                           [  ]5 pts        [  ]5 pts    AGE BETWEEN 16-45 YEARS                                      [  ]1 pt         [  ]1 pt    HISTORY OF PREADOLESCENT   SEXUAL ABUSE                                                             [  ]3 pts        [  ]0pts    PSYCHOLOGICAL DISEASE                     ADD, OCD, Bipolar, Schizophrenia        [ x ]2 pts         [  ]2 pts                      Depression                                               [ x ]1 pt           [  ]1 pt           SCORING TOTAL   (add numbers and type here)              (*7**)                                     A score of 3 or lower indicated LOW risk for future opioid abuse  A score of 4 to 7 indicated moderate risk for future opioid abuse  A score of 8 or higher indicates a high risk for opioid abuse

## 2024-03-26 LAB
MRSA PCR RESULT.: SIGNIFICANT CHANGE UP
S AUREUS DNA NOSE QL NAA+PROBE: SIGNIFICANT CHANGE UP

## 2024-04-02 PROBLEM — Z86.79 PERSONAL HISTORY OF OTHER DISEASES OF THE CIRCULATORY SYSTEM: Chronic | Status: ACTIVE | Noted: 2024-03-25

## 2024-04-02 PROBLEM — F31.9 BIPOLAR DISORDER, UNSPECIFIED: Chronic | Status: ACTIVE | Noted: 2024-03-25

## 2024-04-11 ENCOUNTER — TRANSCRIPTION ENCOUNTER (OUTPATIENT)
Age: 69
End: 2024-04-11

## 2024-04-11 NOTE — PATIENT PROFILE ADULT - MST SCORE
Pt states MD said he could wait until 09/11/2017 appt to make arrangements for MRI.  Pt states he would like to do the MRI before his 09/11 appt.     Pt can be reached on his cell.     Telephone Information:   mobile [phone]      
Talked to the patient. Patient was seen back in February in the ER for left-sided weakness. At that time CAT scan of the head was done which was negative. Patient states that patient still having dizziness and some weakness. At this time we'll get an MRI. Patient verbalized understanding.  
0

## 2024-04-12 ENCOUNTER — APPOINTMENT (OUTPATIENT)
Dept: NEUROSURGERY | Facility: HOSPITAL | Age: 69
End: 2024-04-12

## 2024-04-12 ENCOUNTER — INPATIENT (INPATIENT)
Facility: HOSPITAL | Age: 69
LOS: 2 days | Discharge: HOME HEALTH | DRG: 93 | End: 2024-04-15
Attending: STUDENT IN AN ORGANIZED HEALTH CARE EDUCATION/TRAINING PROGRAM | Admitting: STUDENT IN AN ORGANIZED HEALTH CARE EDUCATION/TRAINING PROGRAM
Payer: MEDICARE

## 2024-04-12 VITALS
RESPIRATION RATE: 16 BRPM | OXYGEN SATURATION: 99 % | HEART RATE: 54 BPM | DIASTOLIC BLOOD PRESSURE: 71 MMHG | HEIGHT: 70 IN | SYSTOLIC BLOOD PRESSURE: 150 MMHG | WEIGHT: 131.84 LBS | TEMPERATURE: 97 F

## 2024-04-12 DIAGNOSIS — I67.1 CEREBRAL ANEURYSM, NONRUPTURED: ICD-10-CM

## 2024-04-12 PROCEDURE — 99221 1ST HOSP IP/OBS SF/LOW 40: CPT

## 2024-04-12 PROCEDURE — 92240 ICG ANGIOGRAPHY I&R UNI/BI: CPT | Mod: 26,57,82

## 2024-04-12 PROCEDURE — 92240 ICG ANGIOGRAPHY I&R UNI/BI: CPT | Mod: 26

## 2024-04-12 PROCEDURE — 61697 BRAIN ANEURYSM REPR COMPLX: CPT

## 2024-04-12 PROCEDURE — 69990 MICROSURGERY ADD-ON: CPT

## 2024-04-12 PROCEDURE — 61697 BRAIN ANEURYSM REPR COMPLX: CPT | Mod: 82

## 2024-04-12 PROCEDURE — 69990 MICROSURGERY ADD-ON: CPT | Mod: 82

## 2024-04-12 DEVICE — IMPLANTABLE DEVICE: Type: IMPLANTABLE DEVICE | Site: RIGHT | Status: FUNCTIONAL

## 2024-04-12 DEVICE — TACHOSIL 9.5 X 4.8CM: Type: IMPLANTABLE DEVICE | Site: RIGHT | Status: FUNCTIONAL

## 2024-04-12 DEVICE — SURGICEL FIBRILLAR 4 X 4": Type: IMPLANTABLE DEVICE | Site: RIGHT | Status: FUNCTIONAL

## 2024-04-12 DEVICE — FLOSEAL WITH RECOTHROM THROMBIN 10ML: Type: IMPLANTABLE DEVICE | Site: RIGHT | Status: FUNCTIONAL

## 2024-04-12 DEVICE — UNI TEMP CLIP HEADING: Type: IMPLANTABLE DEVICE | Site: RIGHT | Status: FUNCTIONAL

## 2024-04-12 DEVICE — MAYFIELD SKULL PIN ADULT RADIOLUCENT: Type: IMPLANTABLE DEVICE | Site: RIGHT | Status: FUNCTIONAL

## 2024-04-12 DEVICE — CLIP APPLIER ETHICON LIGACLIP 11.5" MEDIUM: Type: IMPLANTABLE DEVICE | Site: RIGHT | Status: FUNCTIONAL

## 2024-04-12 DEVICE — SCRX-DRIVE 1.5X4MM: Type: IMPLANTABLE DEVICE | Site: RIGHT | Status: FUNCTIONAL

## 2024-04-12 DEVICE — COVER BUR H LG: Type: IMPLANTABLE DEVICE | Site: RIGHT | Status: FUNCTIONAL

## 2024-04-12 DEVICE — KIT A-LINE 1LUM 20G X 12CM SAFE KIT: Type: IMPLANTABLE DEVICE | Site: RIGHT | Status: FUNCTIONAL

## 2024-04-12 DEVICE — SURGIFOAM PAD 8CM X 12.5CM X 10MM (100): Type: IMPLANTABLE DEVICE | Site: RIGHT | Status: FUNCTIONAL

## 2024-04-12 DEVICE — SURGICEL 2 X 14": Type: IMPLANTABLE DEVICE | Site: RIGHT | Status: FUNCTIONAL

## 2024-04-12 RX ORDER — POLYETHYLENE GLYCOL 3350 17 G/17G
17 POWDER, FOR SOLUTION ORAL DAILY
Refills: 0 | Status: DISCONTINUED | OUTPATIENT
Start: 2024-04-12 | End: 2024-04-14

## 2024-04-12 RX ORDER — CEFAZOLIN SODIUM 1 G
2000 VIAL (EA) INJECTION EVERY 8 HOURS
Refills: 0 | Status: DISCONTINUED | OUTPATIENT
Start: 2024-04-12 | End: 2024-04-12

## 2024-04-12 RX ORDER — CITALOPRAM 10 MG/1
20 TABLET, FILM COATED ORAL DAILY
Refills: 0 | Status: DISCONTINUED | OUTPATIENT
Start: 2024-04-12 | End: 2024-04-15

## 2024-04-12 RX ORDER — ACETAMINOPHEN 500 MG
975 TABLET ORAL ONCE
Refills: 0 | Status: COMPLETED | OUTPATIENT
Start: 2024-04-12 | End: 2024-04-12

## 2024-04-12 RX ORDER — HYDROMORPHONE HYDROCHLORIDE 2 MG/ML
0.5 INJECTION INTRAMUSCULAR; INTRAVENOUS; SUBCUTANEOUS EVERY 6 HOURS
Refills: 0 | Status: DISCONTINUED | OUTPATIENT
Start: 2024-04-12 | End: 2024-04-15

## 2024-04-12 RX ORDER — NICARDIPINE HYDROCHLORIDE 30 MG/1
5 CAPSULE, EXTENDED RELEASE ORAL
Qty: 40 | Refills: 0 | Status: DISCONTINUED | OUTPATIENT
Start: 2024-04-12 | End: 2024-04-13

## 2024-04-12 RX ORDER — ACETAMINOPHEN 500 MG
650 TABLET ORAL EVERY 6 HOURS
Refills: 0 | Status: DISCONTINUED | OUTPATIENT
Start: 2024-04-12 | End: 2024-04-15

## 2024-04-12 RX ORDER — ROPINIROLE 8 MG/1
1 TABLET, FILM COATED, EXTENDED RELEASE ORAL
Refills: 0 | DISCHARGE

## 2024-04-12 RX ORDER — OXYCODONE HYDROCHLORIDE 5 MG/1
5 TABLET ORAL EVERY 4 HOURS
Refills: 0 | Status: DISCONTINUED | OUTPATIENT
Start: 2024-04-12 | End: 2024-04-15

## 2024-04-12 RX ORDER — BUDESONIDE AND FORMOTEROL FUMARATE DIHYDRATE 160; 4.5 UG/1; UG/1
2 AEROSOL RESPIRATORY (INHALATION)
Refills: 0 | Status: DISCONTINUED | OUTPATIENT
Start: 2024-04-12 | End: 2024-04-15

## 2024-04-12 RX ORDER — UMECLIDINIUM BROMIDE AND VILANTEROL TRIFENATATE 62.5; 25 UG/1; UG/1
1 POWDER RESPIRATORY (INHALATION) DAILY
Refills: 0 | Status: DISCONTINUED | OUTPATIENT
Start: 2024-04-12 | End: 2024-04-15

## 2024-04-12 RX ORDER — IPRATROPIUM/ALBUTEROL SULFATE 18-103MCG
3 AEROSOL WITH ADAPTER (GRAM) INHALATION EVERY 6 HOURS
Refills: 0 | Status: DISCONTINUED | OUTPATIENT
Start: 2024-04-12 | End: 2024-04-15

## 2024-04-12 RX ORDER — FLUTICASONE FUROATE AND VILANTEROL TRIFENATATE 100; 25 UG/1; UG/1
1 POWDER RESPIRATORY (INHALATION)
Refills: 0 | DISCHARGE

## 2024-04-12 RX ORDER — HYDRALAZINE HCL 50 MG
10 TABLET ORAL
Refills: 0 | Status: DISCONTINUED | OUTPATIENT
Start: 2024-04-12 | End: 2024-04-15

## 2024-04-12 RX ORDER — SODIUM CHLORIDE 9 MG/ML
1000 INJECTION INTRAMUSCULAR; INTRAVENOUS; SUBCUTANEOUS
Refills: 0 | Status: DISCONTINUED | OUTPATIENT
Start: 2024-04-12 | End: 2024-04-13

## 2024-04-12 RX ORDER — PROPRANOLOL HCL 160 MG
1 CAPSULE, EXTENDED RELEASE 24HR ORAL
Refills: 0 | DISCHARGE

## 2024-04-12 RX ORDER — UMECLIDINIUM BROMIDE AND VILANTEROL TRIFENATATE 62.5; 25 UG/1; UG/1
1 POWDER RESPIRATORY (INHALATION)
Refills: 0 | DISCHARGE

## 2024-04-12 RX ORDER — LEVETIRACETAM 250 MG/1
500 TABLET, FILM COATED ORAL EVERY 12 HOURS
Refills: 0 | Status: DISCONTINUED | OUTPATIENT
Start: 2024-04-12 | End: 2024-04-15

## 2024-04-12 RX ORDER — ROPINIROLE 8 MG/1
0.25 TABLET, FILM COATED, EXTENDED RELEASE ORAL AT BEDTIME
Refills: 0 | Status: DISCONTINUED | OUTPATIENT
Start: 2024-04-12 | End: 2024-04-15

## 2024-04-12 RX ORDER — LAMOTRIGINE 25 MG/1
1 TABLET, ORALLY DISINTEGRATING ORAL
Refills: 0 | DISCHARGE

## 2024-04-12 RX ORDER — INFLUENZA VIRUS VACCINE 15; 15; 15; 15 UG/.5ML; UG/.5ML; UG/.5ML; UG/.5ML
0.7 SUSPENSION INTRAMUSCULAR ONCE
Refills: 0 | Status: DISCONTINUED | OUTPATIENT
Start: 2024-04-12 | End: 2024-04-15

## 2024-04-12 RX ORDER — ACETAMINOPHEN 500 MG
1000 TABLET ORAL ONCE
Refills: 0 | Status: COMPLETED | OUTPATIENT
Start: 2024-04-12 | End: 2024-04-12

## 2024-04-12 RX ORDER — CITALOPRAM 10 MG/1
1 TABLET, FILM COATED ORAL
Refills: 0 | DISCHARGE

## 2024-04-12 RX ORDER — ONDANSETRON 8 MG/1
4 TABLET, FILM COATED ORAL EVERY 6 HOURS
Refills: 0 | Status: DISCONTINUED | OUTPATIENT
Start: 2024-04-12 | End: 2024-04-15

## 2024-04-12 RX ORDER — OXYCODONE HYDROCHLORIDE 5 MG/1
10 TABLET ORAL EVERY 4 HOURS
Refills: 0 | Status: DISCONTINUED | OUTPATIENT
Start: 2024-04-12 | End: 2024-04-15

## 2024-04-12 RX ORDER — LAMOTRIGINE 25 MG/1
100 TABLET, ORALLY DISINTEGRATING ORAL DAILY
Refills: 0 | Status: DISCONTINUED | OUTPATIENT
Start: 2024-04-12 | End: 2024-04-15

## 2024-04-12 RX ORDER — NICOTINE POLACRILEX 2 MG
1 GUM BUCCAL DAILY
Refills: 0 | Status: DISCONTINUED | OUTPATIENT
Start: 2024-04-12 | End: 2024-04-15

## 2024-04-12 RX ORDER — SENNA PLUS 8.6 MG/1
2 TABLET ORAL AT BEDTIME
Refills: 0 | Status: DISCONTINUED | OUTPATIENT
Start: 2024-04-12 | End: 2024-04-15

## 2024-04-12 RX ORDER — CEFAZOLIN SODIUM 1 G
2000 VIAL (EA) INJECTION EVERY 8 HOURS
Refills: 0 | Status: DISCONTINUED | OUTPATIENT
Start: 2024-04-12 | End: 2024-04-13

## 2024-04-12 RX ADMIN — SODIUM CHLORIDE 75 MILLILITER(S): 9 INJECTION INTRAMUSCULAR; INTRAVENOUS; SUBCUTANEOUS at 17:39

## 2024-04-12 RX ADMIN — Medication 15 MILLIGRAM(S): at 17:39

## 2024-04-12 RX ADMIN — Medication 1000 MILLIGRAM(S): at 13:45

## 2024-04-12 RX ADMIN — SODIUM CHLORIDE 3 MILLILITER(S): 9 INJECTION INTRAMUSCULAR; INTRAVENOUS; SUBCUTANEOUS at 21:28

## 2024-04-12 RX ADMIN — ROPINIROLE 0.25 MILLIGRAM(S): 8 TABLET, FILM COATED, EXTENDED RELEASE ORAL at 21:16

## 2024-04-12 RX ADMIN — Medication 2000 MILLIGRAM(S): at 21:14

## 2024-04-12 RX ADMIN — Medication 400 MILLIGRAM(S): at 13:15

## 2024-04-12 RX ADMIN — Medication 975 MILLIGRAM(S): at 06:18

## 2024-04-12 RX ADMIN — SENNA PLUS 2 TABLET(S): 8.6 TABLET ORAL at 21:15

## 2024-04-12 RX ADMIN — LEVETIRACETAM 500 MILLIGRAM(S): 250 TABLET, FILM COATED ORAL at 21:14

## 2024-04-12 NOTE — PROGRESS NOTE ADULT - ASSESSMENT
69yMale PMH admitted to NSICU now s/p bifrontal craniotomy interhemispheric approach R A2 aneurysm clipping, s/p 2 clips  POD#0.    Plan:    -Q1 hr neurochecks  -Keppra 500BID 7 days. Continue home psych meds  -CTH and CTA head in the morning  -SBP <160, cardene as needed  -Titrate off supplemental O@ as tolerated, pt with COPD continue nebulizers  -Advance diet as tolerated  -Mireles place in OR, pt may have BPH as he was retaining 1L  -Ancef for 24 hours only  -SCDs for DVT px as of now  -Home meds resumed  -Monitor drain output, hemovac to full suction    Case discussed with Dr. Samuel

## 2024-04-12 NOTE — CHART NOTE - NSCHARTNOTEFT_GEN_A_CORE
Neurointerventional Surgery Post Procedure Note    Procedure: Selective Cerebral Angiography     Pre- Procedure Diagnosis: R pericallosal aneurysm   Post- Procedure Diagnosis: R pericallosal aneurysm s/p clipping with no residual aneurysm     : Dr. Samuel  Physician Assistant: Fabi Sterling  Nurse: Audrey  Anesthesiologist: Dr. Leach                         Radiology Tech: Mark Johnston     Sheath: 5 Bengali Sheath    I/Os: estimated blood loss less than 10cc,  Contrast: Omnipaque 240  30 cc    Preliminary Report:  Under a 5 Bengali long sheath via the right groin under general anesthesia via right internal carotid artery, a selective cerebral angiography  was performed and reveals R pericallosal aneurysm s/p clipping with no residual aneurysm. ( Official note to follow).    Patient tolerated procedure well.  Patient remains hemodynamically stable  Results were discussed with patient, patient's family.  Right groin sheath  was discontinued using Vascade closure device at 1206. Hemostasis was obtained with approximately 9 minutes of manual compression.     No active bleeding, no hematoma, no ecchymosis.   Quick clot and safeguard balloon dressing applied at 1215  Patient transferred to neuro ICU in stable condition.

## 2024-04-12 NOTE — CONSULT NOTE ADULT - ASSESSMENT
69yMale PMH admitted to NSICU now s/p bifrontal craniotomy interhemispheric approach R A2 aneurysm clipping, s/p 2 clips  POD#0.      PLAN:  Neuro:  -Post op Angio checks (R groin dressin )  Q1 hour Neuro checks, Q1 hour Vitals  - HOB 30 degrees, Neck midline position  - Maintain normothermia, PO acetaminophen for temp>38 C or pain  - repeat CTH/CTA post op  in am   - AED: Keppra 500mg BID x7 days   -Monitor drain output, Rhemovac to full suction	  - Home med: propanolol 10, buspar 15qd, Lamictal 100qd. Ropinorol 0.25mg qd, celexa 20 qd,     CV:  - SBP Goal<160, R radial A-line in place       Pulm:  -Titrate off supplemental O@ as tolerated, pt with COPD continue nebulizers  -COPD  breoellipta qd, norellipta 62.5/25mcg qd     GI:  - ADAT, npo at this time. dysphagia when able   - start bowel regimen when able   	  Gu:  --Mireles place in OR, pt may have BPH as he was retaining 1L  - Monitor Electrolytes & Renal Function    Heme:  - Monitor H&H  -SCDs for DVT px as of now, hold chemical DVT 2/2 recent surgical procedure  	  ID:  - Monitor WBC and Temperature  -Ancef for 24 hours only    Endo  - Monitor BGL, maintain <180     69yMale PMH admitted to NSICU now s/p bifrontal craniotomy interhemispheric approach R A2 aneurysm clipping, s/p 2 clips  POD#0.      PLAN:  Neuro:  -Post op Angio checks (R groin dressin )  Q1 hour Neuro checks, Q1 hour Vitals  - HOB 30 degrees, Neck midline position  - Maintain normothermia, PO acetaminophen for temp>38 C or pain  - repeat CTH/CTA post op  in am   - AED: Keppra 500mg BID x7 days   -Monitor drain output, Rhemovac to full suction	  -c/w Home med when able to tolerate : propanolol 10BID , buspar 15 BID , Lamictal 100qd. Ropinorol 0.25mg qd, celexa 20 qd,     CV:  - SBP Goal<160, R radial A-line in place       Pulm:  -Titrate off supplemental O@ as tolerated, pt with COPD continue nebulizers  -COPD  breoellipta qd, norellipta 62.5/25mcg qd     GI:  - ADAT, npo at this time. dysphagia when able   - start bowel regimen when able   	  Gu:  --Mireles place in OR, pt may have BPH as he was retaining 1L  - Monitor Electrolytes & Renal Function    Heme:  - Monitor H&H  -SCDs for DVT px as of now, hold chemical DVT 2/2 recent surgical procedure  	  ID:  - Monitor WBC and Temperature  -Ancef for 24 hours only    Endo  - Monitor BGL, maintain <180

## 2024-04-12 NOTE — BRIEF OPERATIVE NOTE - COMMENTS
R A2 aneurysm s/p clipping with 2 clips R A2 aneurysm s/p clipping with 2 clips    I served as assistant surgeon for the entirety of the case to the primary surgeon, Dr. Samuel, as no qualified resident or fellow was available to assist. Please see Dr. Samuel's operative note for full details of the operation.     -Karlene Warren MD

## 2024-04-12 NOTE — CONSULT NOTE ADULT - TIME BILLING
69yMale with cerebral aneurysms s/p bifrontal craniotomy interhemispheric approach R A2 aneurysm clipping 04/12.  PMH - every day smoker, COPD, HTN(Not on any medication), bipolar disorder, depression, anxiety, cervical radiculopathy.    PLAN:  - Neuro checks, post angio protocol  - HOB 30 degrees, Neck midline position  - repeat CTH/CTA post op  in am   - AED: Keppra 500mg BID x7 days for sz ppx   -Monitor drain output,   - pain control  -restart home meds propanolol 10BID , buspar 15 BID , Lamictal 100qd, Ropinirole 0.25mg qd, celexa 20 qd,   - SBP Goal 100-160  - maintain Osats>92%, PRN nebulizers, cont home inhalers  - diet as tolerated  - Monitor Electrolytes & Renal Function  -SCDs for DVT px as of now, hold chemical DVT 2/2 fresh postop    Time spent included review of relevant history, clinical examination, review of data and images, discussion of treatment with the multidisciplinary team and any consultants involved in this patient’s care.

## 2024-04-13 LAB
ANION GAP SERPL CALC-SCNC: 13 MMOL/L — SIGNIFICANT CHANGE UP (ref 5–17)
BUN SERPL-MCNC: 18.6 MG/DL — SIGNIFICANT CHANGE UP (ref 8–20)
CALCIUM SERPL-MCNC: 7.7 MG/DL — LOW (ref 8.4–10.5)
CHLORIDE SERPL-SCNC: 104 MMOL/L — SIGNIFICANT CHANGE UP (ref 96–108)
CO2 SERPL-SCNC: 21 MMOL/L — LOW (ref 22–29)
CREAT SERPL-MCNC: 1.41 MG/DL — HIGH (ref 0.5–1.3)
EGFR: 54 ML/MIN/1.73M2 — LOW
GLUCOSE SERPL-MCNC: 127 MG/DL — HIGH (ref 70–99)
HCT VFR BLD CALC: 35.6 % — LOW (ref 39–50)
HGB BLD-MCNC: 11.9 G/DL — LOW (ref 13–17)
MAGNESIUM SERPL-MCNC: 1.8 MG/DL — SIGNIFICANT CHANGE UP (ref 1.6–2.6)
MCHC RBC-ENTMCNC: 30.2 PG — SIGNIFICANT CHANGE UP (ref 27–34)
MCHC RBC-ENTMCNC: 33.4 GM/DL — SIGNIFICANT CHANGE UP (ref 32–36)
MCV RBC AUTO: 90.4 FL — SIGNIFICANT CHANGE UP (ref 80–100)
PHOSPHATE SERPL-MCNC: 4 MG/DL — SIGNIFICANT CHANGE UP (ref 2.4–4.7)
PLATELET # BLD AUTO: 257 K/UL — SIGNIFICANT CHANGE UP (ref 150–400)
POTASSIUM SERPL-MCNC: 4 MMOL/L — SIGNIFICANT CHANGE UP (ref 3.5–5.3)
POTASSIUM SERPL-SCNC: 4 MMOL/L — SIGNIFICANT CHANGE UP (ref 3.5–5.3)
RBC # BLD: 3.94 M/UL — LOW (ref 4.2–5.8)
RBC # FLD: 14.1 % — SIGNIFICANT CHANGE UP (ref 10.3–14.5)
SODIUM SERPL-SCNC: 138 MMOL/L — SIGNIFICANT CHANGE UP (ref 135–145)
WBC # BLD: 12.78 K/UL — HIGH (ref 3.8–10.5)
WBC # FLD AUTO: 12.78 K/UL — HIGH (ref 3.8–10.5)

## 2024-04-13 PROCEDURE — 70496 CT ANGIOGRAPHY HEAD: CPT | Mod: 26

## 2024-04-13 PROCEDURE — 99232 SBSQ HOSP IP/OBS MODERATE 35: CPT

## 2024-04-13 PROCEDURE — 99222 1ST HOSP IP/OBS MODERATE 55: CPT

## 2024-04-13 PROCEDURE — 70450 CT HEAD/BRAIN W/O DYE: CPT | Mod: 26,59

## 2024-04-13 RX ORDER — CHLORHEXIDINE GLUCONATE 213 G/1000ML
1 SOLUTION TOPICAL DAILY
Refills: 0 | Status: DISCONTINUED | OUTPATIENT
Start: 2024-04-13 | End: 2024-04-15

## 2024-04-13 RX ORDER — SODIUM BICARBONATE 1 MEQ/ML
0.25 SYRINGE (ML) INTRAVENOUS
Qty: 150 | Refills: 0 | Status: DISCONTINUED | OUTPATIENT
Start: 2024-04-13 | End: 2024-04-15

## 2024-04-13 RX ORDER — TAMSULOSIN HYDROCHLORIDE 0.4 MG/1
0.4 CAPSULE ORAL AT BEDTIME
Refills: 0 | Status: DISCONTINUED | OUTPATIENT
Start: 2024-04-13 | End: 2024-04-15

## 2024-04-13 RX ORDER — MAGNESIUM SULFATE 500 MG/ML
2 VIAL (ML) INJECTION ONCE
Refills: 0 | Status: COMPLETED | OUTPATIENT
Start: 2024-04-13 | End: 2024-04-13

## 2024-04-13 RX ADMIN — CHLORHEXIDINE GLUCONATE 1 APPLICATION(S): 213 SOLUTION TOPICAL at 13:48

## 2024-04-13 RX ADMIN — SENNA PLUS 2 TABLET(S): 8.6 TABLET ORAL at 22:25

## 2024-04-13 RX ADMIN — SODIUM CHLORIDE 3 MILLILITER(S): 9 INJECTION INTRAMUSCULAR; INTRAVENOUS; SUBCUTANEOUS at 14:41

## 2024-04-13 RX ADMIN — SODIUM CHLORIDE 3 MILLILITER(S): 9 INJECTION INTRAMUSCULAR; INTRAVENOUS; SUBCUTANEOUS at 05:16

## 2024-04-13 RX ADMIN — LEVETIRACETAM 500 MILLIGRAM(S): 250 TABLET, FILM COATED ORAL at 20:26

## 2024-04-13 RX ADMIN — ROPINIROLE 0.25 MILLIGRAM(S): 8 TABLET, FILM COATED, EXTENDED RELEASE ORAL at 22:24

## 2024-04-13 RX ADMIN — Medication 15 MILLIGRAM(S): at 17:55

## 2024-04-13 RX ADMIN — Medication 100 MEQ/KG/HR: at 13:48

## 2024-04-13 RX ADMIN — TAMSULOSIN HYDROCHLORIDE 0.4 MILLIGRAM(S): 0.4 CAPSULE ORAL at 22:24

## 2024-04-13 RX ADMIN — POLYETHYLENE GLYCOL 3350 17 GRAM(S): 17 POWDER, FOR SOLUTION ORAL at 13:47

## 2024-04-13 RX ADMIN — Medication 1 PATCH: at 13:47

## 2024-04-13 RX ADMIN — Medication 25 GRAM(S): at 05:47

## 2024-04-13 RX ADMIN — CITALOPRAM 20 MILLIGRAM(S): 10 TABLET, FILM COATED ORAL at 13:47

## 2024-04-13 RX ADMIN — BUDESONIDE AND FORMOTEROL FUMARATE DIHYDRATE 2 PUFF(S): 160; 4.5 AEROSOL RESPIRATORY (INHALATION) at 21:11

## 2024-04-13 RX ADMIN — LEVETIRACETAM 500 MILLIGRAM(S): 250 TABLET, FILM COATED ORAL at 10:27

## 2024-04-13 RX ADMIN — LAMOTRIGINE 100 MILLIGRAM(S): 25 TABLET, ORALLY DISINTEGRATING ORAL at 13:47

## 2024-04-13 RX ADMIN — Medication 2000 MILLIGRAM(S): at 03:54

## 2024-04-13 RX ADMIN — Medication 1 PATCH: at 19:39

## 2024-04-13 RX ADMIN — BUDESONIDE AND FORMOTEROL FUMARATE DIHYDRATE 2 PUFF(S): 160; 4.5 AEROSOL RESPIRATORY (INHALATION) at 09:00

## 2024-04-13 RX ADMIN — Medication 15 MILLIGRAM(S): at 05:03

## 2024-04-13 NOTE — OCCUPATIONAL THERAPY INITIAL EVALUATION ADULT - NSOTDISCHREC_GEN_A_CORE
"Ms. Jenni Todd is a 50 y.o. female w/ CAD s/p 2 PCI (2012), HFrEF 30%, HTN, DM, and ESRD who presented from [facility] for hypotension and fever. Admitted to the MICU for septic Shock. Patient earlier today was getting dialysis and her blood pressure was low. She was also noted to have a fever of 101 and was sent to the ED. Per patient she nanci good with no complaints. She denies any cough, nausea, vomiting or abdominal pain , she complained of feeling dizzy. This was her first episode of fever. Patient stated that she was recently discharged a week ago to [facility] after developing  peritonitis. She used to do PD but switched to regular HD after peritonitis. PD cath after becoming septic from peritonitis. Attempts to place tunneled IJ catheters and grafts failed. She has a left femoral catheter placed for HD . Last HD today, took off 1.5 L. Patient reports BP had been low all day since HD, approximately 80 systolic. Her only complaints is "a funny feeling in my center chest" that is not tightness, pressure, or pain.     In the ED patient found hypotensive with lactate > 12.     Per last discharge summary:    used to be on home PD admitted to [facility] on 2/21 for peritonitis. PD catheter removed. Due to hx of recurrent peritonitis she was transitioned to HD. Bilateral IJ tunneled catheters were attempted without success. Now has a temporary dialysis catheter in her left fem. Transferred to [facility] for vascular. Nephrology access team attempted a tunneled line and were unsuccessful. Vascular surgery consulted for HD access. Hospital course complicated by GIB with low risk ulcer. S/p OR 3/22 per vascular for RUE graft ([facility]). 3/28 groin perm-cath placed. NSTEMI 3/29.     " Home assist prn

## 2024-04-13 NOTE — CHART NOTE - NSCHARTNOTEFT_GEN_A_CORE
NSICU Downgrade Note    HPI: Pt is  a 68 year old male, Pt right hand dominate, seen today pre-op for Right Craniotomy interhemispheric approach for cerebral aneurisms. Pt reports incidental cerebral aneurysms findings recently discovered on imaging by neurology due to hand tremors. 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 Pt medical hx includes current every day smoker 1 1/2 pack a day,  HTN(Not on any medication), bipolar disorder, depression, anxiety, COPD, cervical radiculopathy.  Pt denies headaches, denies change in vision, denies fever/chills and s/s of symptoms and any other related issues. Pt scheduled for this surgery on 4/12/24 with Dr. Samuel    (25 Mar 2024 17:19)      Interval Events  69y male s/p R craniotomy for interhemispheric clipping of aneurysm w/ intraop angio with Dr. Samuel, POD 1, seen lying comfortably in bed. Tolerating diet. TOV today. Pt has no acute complaints. Pt was able to walk up and down the farah with PT without issue. Pt pain is under control. Pt is stable fro downgrade to telemetry on neurosurgery service.    Vital Signs Last 24 Hrs  T(C): 36.7 (13 Apr 2024 08:09), Max: 36.7 (12 Apr 2024 16:00)  T(F): 98.1 (13 Apr 2024 08:09), Max: 98.1 (12 Apr 2024 16:00)  HR: 51 (13 Apr 2024 10:00) (50 - 74)  BP: 124/91 (13 Apr 2024 08:00) (90/62 - 167/82)  BP(mean): 103 (13 Apr 2024 08:00) (71 - 103)  RR: 17 (13 Apr 2024 10:00) (12 - 22)  SpO2: 96% (13 Apr 2024 10:00) (94% - 99%)    Parameters below as of 13 Apr 2024 09:02  Patient On (Oxygen Delivery Method): room air      PHYSICAL EXAM:  GENERAL: NAD, well-groomed  HEAD:  Atraumatic, normocephalic  DRAIN: low output  WOUND: Dressing clean dry intact  JIM COMA SCORE: E- V- M- = 15  MENTAL STATUS: AAO x3; Awake; Opens eyes spontaneously; Appropriately conversant without aphasia; following simple commands  CRANIAL NERVES: PERRL. EOMI without nystagmus. Face symmetric w/ normal eye closure and smile, tongue midline. Hearing grossly intact. Speech clear. Head turning and shoulder shrug intact.   MOTOR: strength 5/5 b/l upper and lower extremities  SENSATION: grossly intact to light touch all extremities  COORDINATION: Gait intact  SKIN: Warm, dry    LABS:                        11.9   12.78 )-----------( 257      ( 13 Apr 2024 03:51 )             35.6     04-13    138  |  104  |  18.6  ----------------------------<  127<H>  4.0   |  21.0<L>  |  1.41<H>    Ca    7.7<L>      13 Apr 2024 03:51  Phos  4.0     04-13  Mg     1.8     04-13        Urinalysis Basic - ( 13 Apr 2024 03:51 )    Color: x / Appearance: x / SG: x / pH: x  Gluc: 127 mg/dL / Ketone: x  / Bili: x / Urobili: x   Blood: x / Protein: x / Nitrite: x   Leuk Esterase: x / RBC: x / WBC x   Sq Epi: x / Non Sq Epi: x / Bacteria: x        04-12 @ 07:01 - 04-13 @ 07:00  --------------------------------------------------------  IN: 1727.5 mL / OUT: 2545 mL / NET: -817.5 mL    04-13 @ 07:01 - 04-13 @ 11:26  --------------------------------------------------------  IN: 315 mL / OUT: 50 mL / NET: 265 mL        RADIOLOGY & ADDITIONAL TESTS:    < from: CT Head No Cont (04.13.24 @ 03:21) >    IMPRESSION:    Postoperative change from right frontal craniotomy and pericallosal   aneurysm artery clipping.    Thin 2 mm right parafalcine subdural hematoma. No midline shift.    Mild to moderate pneumocephalus      ASSESSMENT:  69y male s/p R craniotomy for interhemispheric clipping of aneurysm w/ intraop angio with Dr. Samuel, POD 1, stable for downgrade to telemetry on neurosurgery service.    PLAN:     NEURO:  - Post op CTH/CTA completed and reviewed  - Repeat CTH in AM 4/14  - Pain: PRN Oxy 5/10, Dilaudid 0.2, and Tylenol 650  - Keppra 500 BID x7 days  - D/C drain today    PULM:  - Symbicort 80mcg BID  - DUONEB PRN for COPD exacerbation  - SpO2 goal > 92%  - Incentive spirometry    CV:  - SBP goal 100-160  - Propranolol 10 BID  - PRN Hydralazine    RENAL:  - TOV today    GI:  - Diet: Regular  - Bowel regimen: senna and miralax    ENDO:   - Goal euglycemia (-180)    HEME/ONC:  - VTE prophylaxis: SCDs  - Hold Lovenox    ID:   - D/C Ancef today    DISPOSITION:  - Telemetry NSICU Downgrade Note    HPI: Pt is  a 68 year old male, Pt right hand dominate, seen today pre-op for Right Craniotomy interhemispheric approach for cerebral aneurisms. Pt reports incidental cerebral aneurysms findings recently discovered on imaging by neurology due to hand tremors. 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 Pt medical hx includes current every day smoker 1 1/2 pack a day,  HTN(Not on any medication), bipolar disorder, depression, anxiety, COPD, cervical radiculopathy.  Pt denies headaches, denies change in vision, denies fever/chills and s/s of symptoms and any other related issues. Pt scheduled for this surgery on 4/12/24 with Dr. Samuel    (25 Mar 2024 17:19)      Interval Events  69y male s/p R craniotomy for interhemispheric clipping of aneurysm w/ intraop angio with Dr. Samuel, POD 1, seen lying comfortably in bed. Tolerating diet. TOV today. Pt has no acute complaints. Pt was able to walk up and down the far[place] with PT without issue. Pt pain is under control. Pt is stable for downgrade to telemetry on neurosurgery service.    Vital Signs Last 24 Hrs  T(C): 36.7 (13 Apr 2024 08:09), Max: 36.7 (12 Apr 2024 16:00)  T(F): 98.1 (13 Apr 2024 08:09), Max: 98.1 (12 Apr 2024 16:00)  HR: 51 (13 Apr 2024 10:00) (50 - 74)  BP: 124/91 (13 Apr 2024 08:00) (90/62 - 167/82)  BP(mean): 103 (13 Apr 2024 08:00) (71 - 103)  RR: 17 (13 Apr 2024 10:00) (12 - 22)  SpO2: 96% (13 Apr 2024 10:00) (94% - 99%)    Parameters below as of 13 Apr 2024 09:02  Patient On (Oxygen Delivery Method): room air      PHYSICAL EXAM:  GENERAL: NAD, well-groomed  HEAD:  Atraumatic, normocephalic  DRAIN: low output  WOUND: Dressing clean dry intact  JIM COMA SCORE: E- V- M- = 15  MENTAL STATUS: AAO x3; Awake; Opens eyes spontaneously; Appropriately conversant without aphasia; following simple commands  CRANIAL NERVES: PERRL. EOMI without nystagmus. Face symmetric w/ normal eye closure and smile, tongue midline. Hearing grossly intact. Speech clear. Head turning and shoulder shrug intact.   MOTOR: strength 5/5 b/l upper and lower extremities  SENSATION: grossly intact to light touch all extremities  COORDINATION: Gait intact  SKIN: Warm, dry    LABS:                        11.9   12.78 )-----------( 257      ( 13 Apr 2024 03:51 )             35.6     04-13    138  |  104  |  18.6  ----------------------------<  127<H>  4.0   |  21.0<L>  |  1.41<H>    Ca    7.7<L>      13 Apr 2024 03:51  Phos  4.0     04-13  Mg     1.8     04-13        Urinalysis Basic - ( 13 Apr 2024 03:51 )    Color: x / Appearance: x / SG: x / pH: x  Gluc: 127 mg/dL / Ketone: x  / Bili: x / Urobili: x   Blood: x / Protein: x / Nitrite: x   Leuk Esterase: x / RBC: x / WBC x   Sq Epi: x / Non Sq Epi: x / Bacteria: x        04-12 @ 07:01 - 04-13 @ 07:00  --------------------------------------------------------  IN: 1727.5 mL / OUT: 2545 mL / NET: -817.5 mL    04-13 @ 07:01 - 04-13 @ 11:26  --------------------------------------------------------  IN: 315 mL / OUT: 50 mL / NET: 265 mL        RADIOLOGY & ADDITIONAL TESTS:    < from: CT Head No Cont (04.13.24 @ 03:21) >    IMPRESSION:    Postoperative change from right frontal craniotomy and pericallosal   aneurysm artery clipping.    Thin 2 mm right parafalcine subdural hematoma. No midline shift.    Mild to moderate pneumocephalus      ASSESSMENT:  69y male s/p R craniotomy for interhemispheric clipping of aneurysm w/ intraop angio with Dr. Samuel, POD 1, stable for downgrade to telemetry on neurosurgery service.    PLAN:     NEURO:  - Post op CTH/CTA completed and reviewed  - Repeat CTH in AM 4/14  - Pain: PRN Oxy 5/10, Dilaudid 0.2, and Tylenol 650  - Keppra 500 BID x7 days  - D/C drain today (Has one staple in drain site)*    PULM:  - Symbicort 80mcg BID  - DUONEB PRN for COPD exacerbation  - SpO2 goal > 92%  - Incentive spirometry    CV:  - SBP goal 100-160  - Propranolol 10 BID  - PRN Hydralazine    RENAL:  - TOV today    GI:  - Diet: Regular  - Bowel regimen: senna and miralax    ENDO:   - Goal euglycemia (-180)    HEME/ONC:  - VTE prophylaxis: SCDs  - Hold Lovenox    ID:   - D/C Ancef today    DISPOSITION:  - Telemetry NSICU Downgrade Note    HPI: Pt is  a 68 year old male, Pt right hand dominate, seen today pre-op for Right Craniotomy interhemispheric approach for cerebral aneurisms. Pt reports incidental cerebral aneurysms findings recently discovered on imaging by neurology due to hand tremors. 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 Pt medical hx includes current every day smoker 1 1/2 pack a day,  HTN(Not on any medication), bipolar disorder, depression, anxiety, COPD, cervical radiculopathy.  Pt denies headaches, denies change in vision, denies fever/chills and s/s of symptoms and any other related issues. Pt scheduled for this surgery on 4/12/24 with Dr. Samuel    (25 Mar 2024 17:19)      Interval Events  69y male s/p R craniotomy for interhemispheric clipping of aneurysm w/ intraop angio with Dr. Samuel, POD 1, seen lying comfortably in bed. Tolerating diet. TOV today. Pt has no acute complaints. Pt was able to walk up and down the far[place] with PT without issue. Pt pain is under control. Pt is stable for downgrade to telemetry on neurosurgery service.    Vital Signs Last 24 Hrs  T(C): 36.7 (13 Apr 2024 08:09), Max: 36.7 (12 Apr 2024 16:00)  T(F): 98.1 (13 Apr 2024 08:09), Max: 98.1 (12 Apr 2024 16:00)  HR: 51 (13 Apr 2024 10:00) (50 - 74)  BP: 124/91 (13 Apr 2024 08:00) (90/62 - 167/82)  BP(mean): 103 (13 Apr 2024 08:00) (71 - 103)  RR: 17 (13 Apr 2024 10:00) (12 - 22)  SpO2: 96% (13 Apr 2024 10:00) (94% - 99%)    Parameters below as of 13 Apr 2024 09:02  Patient On (Oxygen Delivery Method): room air      PHYSICAL EXAM:  GENERAL: NAD, well-groomed  HEAD:  Atraumatic, normocephalic  DRAIN: low output  WOUND: Dressing clean dry intact  JIM COMA SCORE: E- V- M- = 15  MENTAL STATUS: AAO x3; Awake; Opens eyes spontaneously; Appropriately conversant without aphasia; following simple commands  CRANIAL NERVES: PERRL. EOMI without nystagmus. Face symmetric w/ normal eye closure and smile, tongue midline. Hearing grossly intact. Speech clear. Head turning and shoulder shrug intact.   MOTOR: strength 5/5 b/l upper and lower extremities  SENSATION: grossly intact to light touch all extremities  COORDINATION: Gait intact  SKIN: Warm, dry    LABS:                        11.9   12.78 )-----------( 257      ( 13 Apr 2024 03:51 )             35.6     04-13    138  |  104  |  18.6  ----------------------------<  127<H>  4.0   |  21.0<L>  |  1.41<H>    Ca    7.7<L>      13 Apr 2024 03:51  Phos  4.0     04-13  Mg     1.8     04-13        Urinalysis Basic - ( 13 Apr 2024 03:51 )    Color: x / Appearance: x / SG: x / pH: x  Gluc: 127 mg/dL / Ketone: x  / Bili: x / Urobili: x   Blood: x / Protein: x / Nitrite: x   Leuk Esterase: x / RBC: x / WBC x   Sq Epi: x / Non Sq Epi: x / Bacteria: x        04-12 @ 07:01 - 04-13 @ 07:00  --------------------------------------------------------  IN: 1727.5 mL / OUT: 2545 mL / NET: -817.5 mL    04-13 @ 07:01 - 04-13 @ 11:26  --------------------------------------------------------  IN: 315 mL / OUT: 50 mL / NET: 265 mL        RADIOLOGY & ADDITIONAL TESTS:    < from: CT Head No Cont (04.13.24 @ 03:21) >    IMPRESSION:    Postoperative change from right frontal craniotomy and pericallosal   aneurysm artery clipping.    Thin 2 mm right parafalcine subdural hematoma. No midline shift.    Mild to moderate pneumocephalus      ASSESSMENT:  69y male s/p R craniotomy for interhemispheric clipping of aneurysm w/ intraop angio with Dr. Samuel, POD 1, stable for downgrade to telemetry on neurosurgery service.    PLAN:     NEURO:  - Post op CTH/CTA completed and reviewed  - Repeat CTH in AM 4/14  - Pain: PRN Oxy 5/10, Dilaudid 0.2, and Tylenol 650  - Keppra 500 BID x7 days  - D/C drain today (Has one staple in drain site)*    PULM:  - Symbicort 80mcg BID  - DUONEB PRN for COPD exacerbation  - SpO2 goal > 92%  - Incentive spirometry    CV:  - SBP goal 100-160  - Propranolol 10 BID  - PRN Hydralazine    RENAL:  - TOV today    GI:  - Diet: Regular  - Bowel regimen: senna and miralax    ENDO:   - Goal euglycemia (-180)    HEME/ONC:  - VTE prophylaxis: SCDs  - Hold Lovenox    ID:   - D/C Ancef today    DISPOSITION:  - Telemetry  -------------------------------------------------------------------------    ATTENDING'S ATTESTATION    69yMale with cerebral aneurysms s/p bifrontal craniotomy interhemispheric approach R A2 aneurysm clipping 04/12.  PMH - every day smoker, COPD, HTN(Not on any medication), bipolar disorder, depression, anxiety, cervical radiculopathy.  Kris on CKD, component of DANI.  Anemia, expected postop.    PLAN:  - Neuro checks  - repeat CTH in am  - AED: Keppra 500mg BID x7 days for sz ppx   - drain removal today per NSGy  - pain control  - cont home meds, cont inhalers  - SBP Goal 100-160, d/c [place]  - maintain Osats>92%, PRN nebulizers, cont home inhalers  - diet as tolerated  - Monitor Electrolytes & Renal Function; TOV - monitor UOP  - bicarb ggt for 6 hrs, check BMP in am  - SCDs for DVT px as of now, hold chemical DVT 2/2 fresh postop  - repeat CBC in am  - stable to be downgraded to Telemetry, potential d/c home tomorrow    Time spent included review of relevant history, clinical examination, review of data and images, discussion of treatment with the multidisciplinary team and any consultants involved in this patient’s care. NSICU Downgrade Note    HPI: Pt is  a 68 year old male, Pt right hand dominate, seen today pre-op for Right Craniotomy interhemispheric approach for cerebral aneurisms. Pt reports incidental cerebral aneurysms findings recently discovered on imaging by neurology due to hand tremors. 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 Pt medical hx includes current every day smoker 1 1/2 pack a day,  HTN(Not on any medication), bipolar disorder, depression, anxiety, COPD, cervical radiculopathy.  Pt denies headaches, denies change in vision, denies fever/chills and s/s of symptoms and any other related issues. Pt scheduled for this surgery on 4/12/24 with Dr. Samuel    (25 Mar 2024 17:19)      Interval Events  69y male s/p R craniotomy for interhemispheric clipping of aneurysm w/ intraop angio with Dr. Samuel, POD 1, seen lying comfortably in bed. Tolerating diet. TOV today. Pt has no acute complaints. Pt was able to walk up and down the far[place] with PT without issue. Pt pain is under control. Pt is stable for downgrade to telemetry on neurosurgery service.    Vital Signs Last 24 Hrs  T(C): 36.7 (13 Apr 2024 08:09), Max: 36.7 (12 Apr 2024 16:00)  T(F): 98.1 (13 Apr 2024 08:09), Max: 98.1 (12 Apr 2024 16:00)  HR: 51 (13 Apr 2024 10:00) (50 - 74)  BP: 124/91 (13 Apr 2024 08:00) (90/62 - 167/82)  BP(mean): 103 (13 Apr 2024 08:00) (71 - 103)  RR: 17 (13 Apr 2024 10:00) (12 - 22)  SpO2: 96% (13 Apr 2024 10:00) (94% - 99%)    Parameters below as of 13 Apr 2024 09:02  Patient On (Oxygen Delivery Method): room air      PHYSICAL EXAM:  GENERAL: NAD, well-groomed  HEAD:  Atraumatic, normocephalic  DRAIN: low output  WOUND: Dressing clean dry intact  JIM COMA SCORE: E- V- M- = 15  MENTAL STATUS: AAO x3; Awake; Opens eyes spontaneously; Appropriately conversant without aphasia; following simple commands  CRANIAL NERVES: PERRL. EOMI without nystagmus. Face symmetric w/ normal eye closure and smile, tongue midline. Hearing grossly intact. Speech clear. Head turning and shoulder shrug intact.   MOTOR: strength 5/5 b/l upper and lower extremities  SENSATION: grossly intact to light touch all extremities  COORDINATION: Gait intact  SKIN: Warm, dry    LABS:                        11.9   12.78 )-----------( 257      ( 13 Apr 2024 03:51 )             35.6     04-13    138  |  104  |  18.6  ----------------------------<  127<H>  4.0   |  21.0<L>  |  1.41<H>    Ca    7.7<L>      13 Apr 2024 03:51  Phos  4.0     04-13  Mg     1.8     04-13        Urinalysis Basic - ( 13 Apr 2024 03:51 )    Color: x / Appearance: x / SG: x / pH: x  Gluc: 127 mg/dL / Ketone: x  / Bili: x / Urobili: x   Blood: x / Protein: x / Nitrite: x   Leuk Esterase: x / RBC: x / WBC x   Sq Epi: x / Non Sq Epi: x / Bacteria: x        04-12 @ 07:01 - 04-13 @ 07:00  --------------------------------------------------------  IN: 1727.5 mL / OUT: 2545 mL / NET: -817.5 mL    04-13 @ 07:01 - 04-13 @ 11:26  --------------------------------------------------------  IN: 315 mL / OUT: 50 mL / NET: 265 mL        RADIOLOGY & ADDITIONAL TESTS:    < from: CT Head No Cont (04.13.24 @ 03:21) >    IMPRESSION:    Postoperative change from right frontal craniotomy and pericallosal   aneurysm artery clipping.    Thin 2 mm right parafalcine subdural hematoma. No midline shift.    Mild to moderate pneumocephalus      ASSESSMENT:  69y male s/p R craniotomy for interhemispheric clipping of aneurysm w/ intraop angio with Dr. Samuel, POD 1, stable for downgrade to telemetry on neurosurgery service.    PLAN:     NEURO:  - Post op CTH/CTA completed and reviewed  - Repeat CTH in AM 4/14  - Pain: PRN Oxy 5/10, Dilaudid 0.2, and Tylenol 650  - Keppra 500 BID x7 days  - D/C drain today (Has one staple in drain site)*    PULM:  - Symbicort 80mcg BID  - DUONEB PRN for COPD exacerbation  - SpO2 goal > 92%  - Incentive spirometry    CV:  - SBP goal 100-160  - Propranolol 10 BID  - PRN Hydralazine    RENAL:  - TOV today    GI:  - Diet: Regular  - Bowel regimen: senna and miralax    ENDO:   - Goal euglycemia (-180)    HEME/ONC:  - VTE prophylaxis: SCDs  - Hold Lovenox    ID:   - D/C Ancef today    DISPOSITION:  - Telemetry  -------------------------------------------------------------------------    ATTENDING'S ATTESTATION    69yMale with cerebral aneurysms s/p bifrontal craniotomy interhemispheric approach R A2 aneurysm clipping 04/12.  PMH - every day smoker, COPD, HTN(Not on any medication), bipolar disorder, depression, anxiety, cervical radiculopathy.  Kris on CKD, component of DANI.  Anemia, expected postop.    PLAN:  - Neuro checks  - repeat CTH in am  - AED: Keppra 500mg BID x7 days for sz ppx   - drain removal today per NSGy  - pain control  - cont home meds, cont inhalers  - SBP Goal 100-160, d/c [place]  - maintain Osats>92%, PRN nebulizers, cont home inhalers  - diet as tolerated  - Monitor Electrolytes & Renal Function; TOV - monitor UOP  - bicarb ggt for 6 hrs, check BMP in am  - SCDs for DVT px as of now, hold chemical DVT 2/2 fresh postop  - repeat CBC in am  - stable to be downgraded to Telemetry, potential d/c home tomorrow    Time spent - 35 min, non-critical, included review of relevant history, clinical examination, review of data and images, discussion of treatment with the multidisciplinary team and any consultants involved in this patient’s care.

## 2024-04-13 NOTE — PHYSICAL THERAPY INITIAL EVALUATION ADULT - PERTINENT HX OF CURRENT PROBLEM, REHAB EVAL
Pt reports incidental cerebral aneurysms findings recently discovered on imaging by neurology due to hand tremors. 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 s/p a bifrontal craniotomy for clipping of interhemispheric A2 aneurysms

## 2024-04-13 NOTE — OCCUPATIONAL THERAPY INITIAL EVALUATION ADULT - VISUAL ACUITY
+reading glasses; no complaints in vision offered. Central and peripheral fields intact bilaterally.

## 2024-04-13 NOTE — OCCUPATIONAL THERAPY INITIAL EVALUATION ADULT - GROSSLY INTACT, SENSORY
Chronic, controlled.  Latest blood pressure and vitals reviewed-   Temp:  [96.7 °F (35.9 °C)-98.5 °F (36.9 °C)]   Pulse:  [75-77]   Resp:  [18-30]   BP: (134-158)/(67-87)   SpO2:  [90 %-100 %] .   Home meds for hypertension were reviewed and noted below. Hospital anti-hypertensive changes were made as shown below.  Hypertension Medications             amLODIPine (NORVASC) 10 MG tablet Take 1 tablet (10 mg total) by mouth once daily.    furosemide (LASIX) 20 MG tablet Take 0.5 tablets (10 mg total) by mouth once daily.    losartan (COZAAR) 100 MG tablet Take 1 tablet (100 mg total) by mouth once daily.    metoprolol succinate (TOPROL-XL) 100 MG 24 hr tablet Take 1 tablet (100 mg total) by mouth once daily.      Hospital Medications             amLODIPine tablet 10 mg 10 mg, Oral, Daily    chlorothiazide injection 500 mg (Completed) 500 mg, Intravenous, Once    furosemide (LASIX) 200 mg in dextrose 5 % 100 mL continuous infusion (conc: 2 mg/mL) 10 mg/hr (10 mg/hr), Intravenous, Continuous @ 5 mL/hr    furosemide injection 80 mg (Completed) 80 mg, Intravenous, Once, Administer IV push at a max rate of 40 mg/min    losartan tablet 100 mg 100 mg, Oral, Daily    metoprolol succinate (TOPROL-XL) 24 hr tablet 25 mg 25 mg, Oral, Daily, DO NOT CRUSH OR CHEW; SWALLOW WHOLE.        Will utilize p.r.n. blood pressure medication only if patient's blood pressure greater than  180/110 and he develops symptoms such as worsening chest pain or shortness of breath.     Grossly Intact

## 2024-04-13 NOTE — CHART NOTE - NSCHARTNOTEFT_GEN_A_CORE
Subgaleal hemovac drain removed. Dressing removed, site cleaned, and drain removed off suction. Drain site wound closed with 1 staple. No complications.

## 2024-04-13 NOTE — CONSULT NOTE ADULT - SUBJECTIVE AND OBJECTIVE BOX
HPI:  Pt is  a 68 year old male, Pt right hand dominate, seen today pre-op for Right Craniotomy interhemispheric approach for cerebral aneurisms. Pt reports incidental cerebral aneurysms findings recently discovered on imaging by neurology due to hand tremors. 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 Pt medical hx includes current every day smoker 1 1/2 pack a day,  HTN(Not on any medication), bipolar disorder, depression, anxiety, COPD, cervical radiculopathy.  Pt denies headaches, denies change in vision, denies fever/chills and s/s of symptoms and any other related issues. Pt scheduled for this surgery on 4/12/24 with Dr. Samuel    (25 Mar 2024 17:19)        Vital Signs Last 24 Hrs  T(C): 36 (12 Apr 2024 18:00), Max: 36.7 (12 Apr 2024 16:00)  T(F): 96.8 (12 Apr 2024 18:00), Max: 98.1 (12 Apr 2024 16:00)  HR: 56 (12 Apr 2024 18:00) (54 - 74)  BP: 112/67 (12 Apr 2024 18:00) (105/70 - 167/82)  BP(mean): 80 (12 Apr 2024 18:00) (77 - 103)  RR: 14 (12 Apr 2024 18:00) (12 - 22)  SpO2: 99% (12 Apr 2024 18:00) (94% - 99%)    Parameters below as of 12 Apr 2024 16:00  Patient On (Oxygen Delivery Method): room air        PHYSICAL EXAM:  GENERAL: NAD well-developed  HEAD:  S/p bifrontal  crani. Dressing clean, dry, intact.   DRAINS: Subgaleal to full cution hemovac. Serosanguinous drainage noted.  JIM COMA SCORE: E-4 V-5 M-6 =15  MENTAL STATUS: AAO x3; Awake; Opens eyes spontaneously; Appropriately conversant without aphasia; following simple commands  CRANIAL NERVES: Visual acuity normal for age, visual fields full to confrontation, PERRL. EOMI without nystagmus. Facial sensation intact V1-3 distribution b/l. Face symmetric w/ normal eye closure and smile, tongue midline. Hearing grossly intact. Speech clear. Head turning and shoulder shrug intact.   MOTOR: strength 5/5 b/l upper and lower extremities  SENSATION: grossly intact to light touch all extremities  CHEST/LUNG: nonlabored breathing, no accessory muscle use  ABDOMEN: Soft, nontender, nondistended        LABS:      04-12 @ 07:01  -  04-12 @ 18:28  --------------------------------------------------------  IN: 212.5 mL / OUT: 875 mL / NET: -662.5 mL      
69 y/o male who is right hand dominate, every day smoker 1 1/2 pack a day,  HTN(Not on any medication), bipolar disorder, depression, anxiety, COPD, cervical radiculopathy, he had incidental cerebral aneurysms findings recently discovered on imaging by neurology due to hand tremors. 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  Pt underwent diagnostic cerebral angiogram which confirmed R A2 aneurysm 2.4mm and L cavernous ICA aneurysm 5x6 mm, he came in here for elective Right Craniotomy interhemispheric approach for cerebral aneurisms on 4/12/24 with Dr. Samuel s/p procedure, he tolerated the procedure well, post operatively he was noted to have slightly increase in creatinine from 1.2 to 1.4, medicine consulted for medical management.      Allergies:  	No Known Allergies:       PAST MEDICAL HISTORY:  Bipolar disorder     BPH (benign prostatic hyperplasia)     Cervical radiculopathy     COPD, severe     Depression     Emphysema/COPD     History of cerebral aneurysm     Tremors of nervous system     Urinary retention gonzalez catheter 2/12/1inserted.     PAST SURGICAL HISTORY:  No significant past surgical history.     FAMILY HISTORY:  Father  Still living? No  FH: emphysema, Age at diagnosis: Age Unknown    Mother  Still living? No  FH: throat cancer, Age at diagnosis: Age Unknown.      Social History:   Active smoker, not a drinker or using any drugs         Home Medications:   * Incomplete Medication History as of 25-Mar-2024 17:41 documented in Structured Notes  · 	propranolol 10 mg oral tablet: Last Dose Taken:  , 1 tab(s) orally 2 times a day  · 	BuSpar Dividose 15 mg oral tablet: Last Dose Taken:  , 1 tab(s) orally 2 times a day  · 	LaMICtal 100 mg oral tablet: Last Dose Taken:  , 1 tab(s) orally once a day  · 	rOPINIRole 0.25 mg oral tablet: Last Dose Taken:  , 1 tab(s) orally once a day (at bedtime)  · 	CeleXA 20 mg oral tablet: Last Dose Taken:  , 1 tab(s) orally once a day  · 	Breo Ellipta 50 mcg-25 mcg/inh. inhalation powder: Last Dose Taken:  , 1 puff(s) inhaled once a day  · 	Anoro Ellipta 62.5 mcg-25 mcg/inh inhalation powder: Last Dose Taken:  , 1 puff(s) inhaled once a day      Vital Signs Last 24 Hrs  T(C): 36.7 (13 Apr 2024 16:00), Max: 36.7 (13 Apr 2024 08:09)  T(F): 98 (13 Apr 2024 16:00), Max: 98.1 (13 Apr 2024 08:09)  HR: 53 (13 Apr 2024 16:00) (42 - 68)  BP: 114/63 (13 Apr 2024 16:00) (90/62 - 151/84)  BP(mean): 76 (13 Apr 2024 16:00) (71 - 103)  RR: 18 (13 Apr 2024 16:00) (14 - 22)  SpO2: 96% (13 Apr 2024 16:00) (94% - 99%)    Parameters below as of 13 Apr 2024 16:00  Patient On (Oxygen Delivery Method): room air        PHYSICAL EXAM:    GENERAL: Elderly male looking comfortable   HEENT: right side cranial dressings  NECK: soft, Supple, No JVD  CHEST/LUNG: Clear to auscultate bilaterally; No wheezing  HEART: S1S2+, Regular rate and rhythm; No murmurs  ABDOMEN: Soft, Nontender, Nondistended; Bowel sounds present  EXTREMITIES:  1+ Peripheral Pulses, No edema  SKIN: No rashes or lesions  NEURO: AAOX3  PSYCH: normal mood      LABS:                        11.9   12.78 )-----------( 257      ( 13 Apr 2024 03:51 )             35.6     04-13    138  |  104  |  18.6  ----------------------------<  127<H>  4.0   |  21.0<L>  |  1.41<H>    Ca    7.7<L>      13 Apr 2024 03:51  Phos  4.0     04-13  Mg     1.8     04-13      I&O's Summary    12 Apr 2024 07:01  -  13 Apr 2024 07:00  --------------------------------------------------------  IN: 1727.5 mL / OUT: 2545 mL / NET: -817.5 mL    13 Apr 2024 07:01  -  13 Apr 2024 16:22  --------------------------------------------------------  IN: 315 mL / OUT: 50 mL / NET: 265 mL        MEDICATIONS  (STANDING):  budesonide  80 MICROgram(s)/formoterol 4.5 MICROgram(s) Inhaler 2 Puff(s) Inhalation two times a day  busPIRone 15 milliGRAM(s) Oral two times a day  chlorhexidine 2% Cloths 1 Application(s) Topical daily  citalopram 20 milliGRAM(s) Oral daily  influenza  Vaccine (HIGH DOSE) 0.7 milliLiter(s) IntraMuscular once  lamoTRIgine 100 milliGRAM(s) Oral daily  levETIRAcetam 500 milliGRAM(s) Oral every 12 hours  nicotine - 21 mG/24Hr(s) Patch 1 Patch Transdermal daily  polyethylene glycol 3350 17 Gram(s) Oral daily  propranolol 10 milliGRAM(s) Oral two times a day  rOPINIRole 0.25 milliGRAM(s) Oral at bedtime  senna 2 Tablet(s) Oral at bedtime  sodium bicarbonate  Infusion 0.251 mEq/kG/Hr (100 mL/Hr) IV Continuous <Continuous>  sodium chloride 0.9% lock flush 3 milliLiter(s) IV Push every 8 hours  umeclidinium 62.5 MICROgram(s)/vilanterol 25 MICROgram(s) Inhaler 1 Puff(s) Inhalation daily    MEDICATIONS  (PRN):  acetaminophen     Tablet .. 650 milliGRAM(s) Oral every 6 hours PRN Temp greater or equal to 38C (100.4F), Mild Pain (1 - 3)  albuterol/ipratropium for Nebulization 3 milliLiter(s) Nebulizer every 6 hours PRN Shortness of Breath and/or Wheezing  hydrALAZINE Injectable 10 milliGRAM(s) IV Push every 2 hours PRN SBP > 160mm Hg  HYDROmorphone  Injectable 0.5 milliGRAM(s) IV Push every 6 hours PRN Severe Pain (7 - 10)  ondansetron Injectable 4 milliGRAM(s) IV Push every 6 hours PRN Nausea and/or Vomiting  oxyCODONE    IR 5 milliGRAM(s) Oral every 4 hours PRN Moderate Pain (4 - 6)  oxyCODONE    IR 10 milliGRAM(s) Oral every 4 hours PRN Severe Pain (7 - 10)

## 2024-04-13 NOTE — PROVIDER CONTACT NOTE (OTHER) - ASSESSMENT
Bladder scan volume 817  Attempted to stand patient at bedside multiple times. Patient unable to void.

## 2024-04-13 NOTE — PROGRESS NOTE ADULT - ASSESSMENT
68 yo male POD#1 s/p a bifrontal craniotomy for clipping of interhemispheric A2 aneurysms    Plan:  -Medical management per NSICU  -Pain Control  -Post op CTH/CTA for AM, can be done sooner if clinically indicated  -Goal -160  -ADAT  -TOV  -Encourage incentive spirometer 10x/hr while awake  -Ancef while drain in  -Continue to monitor and record drain output  -DVT ppx SCD's  -Final plan pending AM rounds with attending

## 2024-04-13 NOTE — OCCUPATIONAL THERAPY INITIAL EVALUATION ADULT - ADDITIONAL COMMENTS
Pt reports lives in a house with 2 housemates, no assist available. 1 ISH with handrail no steps inside to bed/bath. Pt reports full flight with handrail to basement for laundry. Pt has a tub with curtains and grab bars. Pt owns no DME. Pt is right handed and does not drive.

## 2024-04-13 NOTE — OCCUPATIONAL THERAPY INITIAL EVALUATION ADULT - DIAGNOSIS, OT EVAL
69 year old Male reports incidental cerebral aneurysms findings recently discovered on imaging by neurology due to hand tremors. 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 Pt s/p a bifrontal craniotomy for clipping of interhemispheric A2 aneurysms (4/12)

## 2024-04-13 NOTE — PHYSICAL THERAPY INITIAL EVALUATION ADULT - GENERAL OBSERVATIONS, REHAB EVAL
Pt received in bed, + IV, +Tele//BP monitoring + hemovac drain + a-line + gonzalez + VCBs, in NAD, in 0/10 pain, agreeable to PT evaluation

## 2024-04-13 NOTE — PROVIDER CONTACT NOTE (OTHER) - REASON
Bradycardia trend throughout shift asymptomatic
Post bladder scan and failed TOV. Mireles DCD at 1230pm

## 2024-04-13 NOTE — OCCUPATIONAL THERAPY INITIAL EVALUATION ADULT - GENERAL OBSERVATIONS, REHAB EVAL
Received pt semifowler in bed, NAD, +IV, +Tele//BP, +Bonita, +Mireles, +b/l VCB, +hemovac on RA A&Ox4. Pre/post pain 4/10 head, 7/10 R wrist; RN aware. Pt agreeable to OT evaluation

## 2024-04-13 NOTE — PHYSICAL THERAPY INITIAL EVALUATION ADULT - ADDITIONAL COMMENTS
pt reports living in private home with 2 roommates who are unable to assist. Pt has 1 ISH with handrail and flight of stairs inside to basement where laundry is located with handrail. Independent prior to admission. Owns no DME.

## 2024-04-13 NOTE — CONSULT NOTE ADULT - ASSESSMENT
69 y/o male who is right hand dominate, every day smoker 1 1/2 pack a day,  HTN(Not on any medication), bipolar disorder, depression, anxiety, COPD, cervical radiculopathy, he had incidental cerebral aneurysms findings recently discovered on imaging by neurology due to hand tremors. 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  Pt underwent diagnostic cerebral angiogram which confirmed R A2 aneurysm 2.4mm and L cavernous ICA aneurysm 5x6 mm, he came in here for elective s/p R craniotomy for interhemispheric clipping of aneurysm w/ intraop angio on 4/12/24 with Dr. Samuel s/p procedure, he tolerated the procedure well, post operatively he was noted to have slightly increase in creatinine from 1.2 to 1.4, medicine consulted for medical management.     Plan:     Cerebral Aneurysm: s/p s/p R craniotomy for interhemispheric clipping of aneurysm w/ intraop angio with Dr. Samuel, POD 1,  - post op imaging done   -pain meds   -IS   -wound care   -neuro checks  -further management as per primary team     Hx of COPD: will continue with Symbicort 80mcg BID,  DUONEB PRN for COPD exacerbation  - SpO2 goal > 92%  - Incentive spirometry    Hx of Smoking: counselling for cessation, nicotine patches     Hx of HTN: will continue with Propranolol 10 BID with holding parameters   - PRN Hydralazine    SHANICE on CKD: creatinine went up from 1.2 to 1.4, would monitor BMP, has been started on Na Bicarb, will re check BMP later on if not improving will give some IV fluids and will check bladder scan if patient is not retaining       DVT prophylaxis as per primary team   - VTE prophylaxis: SCDs  - Hold Lovenox    Hx of Anxiety and Bipolar disorder: will continue with BuSpar Dividose 15 mg 2 times a day, LaMICtal 100 mg once a day, CeleXA 20 mg once a day.     Restless leg syndrome: will continue with  rOPINIRole 0.25 mg once a day.

## 2024-04-14 LAB
ANION GAP SERPL CALC-SCNC: 10 MMOL/L — SIGNIFICANT CHANGE UP (ref 5–17)
BUN SERPL-MCNC: 14.1 MG/DL — SIGNIFICANT CHANGE UP (ref 8–20)
CALCIUM SERPL-MCNC: 8.1 MG/DL — LOW (ref 8.4–10.5)
CHLORIDE SERPL-SCNC: 103 MMOL/L — SIGNIFICANT CHANGE UP (ref 96–108)
CO2 SERPL-SCNC: 26 MMOL/L — SIGNIFICANT CHANGE UP (ref 22–29)
CREAT SERPL-MCNC: 1.19 MG/DL — SIGNIFICANT CHANGE UP (ref 0.5–1.3)
EGFR: 67 ML/MIN/1.73M2 — SIGNIFICANT CHANGE UP
GLUCOSE SERPL-MCNC: 98 MG/DL — SIGNIFICANT CHANGE UP (ref 70–99)
HCT VFR BLD CALC: 36.1 % — LOW (ref 39–50)
HGB BLD-MCNC: 12.3 G/DL — LOW (ref 13–17)
MAGNESIUM SERPL-MCNC: 1.9 MG/DL — SIGNIFICANT CHANGE UP (ref 1.6–2.6)
MCHC RBC-ENTMCNC: 30.7 PG — SIGNIFICANT CHANGE UP (ref 27–34)
MCHC RBC-ENTMCNC: 34.1 GM/DL — SIGNIFICANT CHANGE UP (ref 32–36)
MCV RBC AUTO: 90 FL — SIGNIFICANT CHANGE UP (ref 80–100)
PHOSPHATE SERPL-MCNC: 1.9 MG/DL — LOW (ref 2.4–4.7)
PLATELET # BLD AUTO: 246 K/UL — SIGNIFICANT CHANGE UP (ref 150–400)
POTASSIUM SERPL-MCNC: 3.7 MMOL/L — SIGNIFICANT CHANGE UP (ref 3.5–5.3)
POTASSIUM SERPL-SCNC: 3.7 MMOL/L — SIGNIFICANT CHANGE UP (ref 3.5–5.3)
RBC # BLD: 4.01 M/UL — LOW (ref 4.2–5.8)
RBC # FLD: 14.1 % — SIGNIFICANT CHANGE UP (ref 10.3–14.5)
SODIUM SERPL-SCNC: 139 MMOL/L — SIGNIFICANT CHANGE UP (ref 135–145)
WBC # BLD: 10.44 K/UL — SIGNIFICANT CHANGE UP (ref 3.8–10.5)
WBC # FLD AUTO: 10.44 K/UL — SIGNIFICANT CHANGE UP (ref 3.8–10.5)

## 2024-04-14 PROCEDURE — 70450 CT HEAD/BRAIN W/O DYE: CPT | Mod: 26

## 2024-04-14 PROCEDURE — 99233 SBSQ HOSP IP/OBS HIGH 50: CPT

## 2024-04-14 RX ORDER — ENOXAPARIN SODIUM 100 MG/ML
40 INJECTION SUBCUTANEOUS EVERY 24 HOURS
Refills: 0 | Status: DISCONTINUED | OUTPATIENT
Start: 2024-04-14 | End: 2024-04-15

## 2024-04-14 RX ORDER — MAGNESIUM SULFATE 500 MG/ML
1 VIAL (ML) INJECTION ONCE
Refills: 0 | Status: COMPLETED | OUTPATIENT
Start: 2024-04-14 | End: 2024-04-14

## 2024-04-14 RX ORDER — POTASSIUM PHOSPHATE, MONOBASIC POTASSIUM PHOSPHATE, DIBASIC 236; 224 MG/ML; MG/ML
30 INJECTION, SOLUTION INTRAVENOUS ONCE
Refills: 0 | Status: COMPLETED | OUTPATIENT
Start: 2024-04-14 | End: 2024-04-14

## 2024-04-14 RX ORDER — POLYETHYLENE GLYCOL 3350 17 G/17G
17 POWDER, FOR SOLUTION ORAL
Refills: 0 | Status: DISCONTINUED | OUTPATIENT
Start: 2024-04-14 | End: 2024-04-15

## 2024-04-14 RX ADMIN — LEVETIRACETAM 500 MILLIGRAM(S): 250 TABLET, FILM COATED ORAL at 21:59

## 2024-04-14 RX ADMIN — ENOXAPARIN SODIUM 40 MILLIGRAM(S): 100 INJECTION SUBCUTANEOUS at 22:00

## 2024-04-14 RX ADMIN — SENNA PLUS 2 TABLET(S): 8.6 TABLET ORAL at 22:00

## 2024-04-14 RX ADMIN — Medication 5 MILLIGRAM(S): at 21:59

## 2024-04-14 RX ADMIN — Medication 1 PATCH: at 07:30

## 2024-04-14 RX ADMIN — BUDESONIDE AND FORMOTEROL FUMARATE DIHYDRATE 2 PUFF(S): 160; 4.5 AEROSOL RESPIRATORY (INHALATION) at 08:59

## 2024-04-14 RX ADMIN — POLYETHYLENE GLYCOL 3350 17 GRAM(S): 17 POWDER, FOR SOLUTION ORAL at 12:53

## 2024-04-14 RX ADMIN — ROPINIROLE 0.25 MILLIGRAM(S): 8 TABLET, FILM COATED, EXTENDED RELEASE ORAL at 21:59

## 2024-04-14 RX ADMIN — Medication 1 PATCH: at 12:54

## 2024-04-14 RX ADMIN — Medication 15 MILLIGRAM(S): at 17:50

## 2024-04-14 RX ADMIN — SODIUM CHLORIDE 3 MILLILITER(S): 9 INJECTION INTRAMUSCULAR; INTRAVENOUS; SUBCUTANEOUS at 21:56

## 2024-04-14 RX ADMIN — Medication 100 GRAM(S): at 09:27

## 2024-04-14 RX ADMIN — LEVETIRACETAM 500 MILLIGRAM(S): 250 TABLET, FILM COATED ORAL at 08:57

## 2024-04-14 RX ADMIN — TAMSULOSIN HYDROCHLORIDE 0.4 MILLIGRAM(S): 0.4 CAPSULE ORAL at 21:59

## 2024-04-14 RX ADMIN — CHLORHEXIDINE GLUCONATE 1 APPLICATION(S): 213 SOLUTION TOPICAL at 12:54

## 2024-04-14 RX ADMIN — LAMOTRIGINE 100 MILLIGRAM(S): 25 TABLET, ORALLY DISINTEGRATING ORAL at 12:53

## 2024-04-14 RX ADMIN — SODIUM CHLORIDE 3 MILLILITER(S): 9 INJECTION INTRAMUSCULAR; INTRAVENOUS; SUBCUTANEOUS at 13:30

## 2024-04-14 RX ADMIN — BUDESONIDE AND FORMOTEROL FUMARATE DIHYDRATE 2 PUFF(S): 160; 4.5 AEROSOL RESPIRATORY (INHALATION) at 21:10

## 2024-04-14 RX ADMIN — POTASSIUM PHOSPHATE, MONOBASIC POTASSIUM PHOSPHATE, DIBASIC 83.33 MILLIMOLE(S): 236; 224 INJECTION, SOLUTION INTRAVENOUS at 09:28

## 2024-04-14 RX ADMIN — CITALOPRAM 20 MILLIGRAM(S): 10 TABLET, FILM COATED ORAL at 12:53

## 2024-04-14 NOTE — PROGRESS NOTE ADULT - ASSESSMENT
69M current smoker w/ pmhx of HTN, bipolar d/o, depression, anxiety, COPD, cervical radiculopathy now POD#2 s/p bifrontal crani for R pericallosal aneurysm clipping.     PLAN:    -Q4h neurochecks  -Pain control prn, avoid oversedation  -Keppra 500 BID x 7 days. Continue home psych meds  -SBP <160  -Keep gonzalez in place, f/u with urology as an outpatient  -Labs improved today, medicine following, reccs appreciated   -PT/OT: home w/ assist prn  -Will likely d/c tomorrow  -VTE prophylaxis: SCDs, lovenox   -Discussed case w/ Dr. Samuel    69M current smoker w/ pmhx of HTN, bipolar d/o, depression, anxiety, COPD, cervical radiculopathy now POD#2 s/p bifrontal crani for R pericallosal aneurysm clipping.     PLAN:    -Q4h neurochecks  -Pain control prn, avoid oversedation  -Keppra 500 BID x 7 days. Continue home psych meds  -SBP <160  -Keep gonzalez in place, f/u with urology as an outpatient  -Labs improved today, medicine following, reccs appreciated   -PT/OT: home w/ assist prn  -Will likely d/c tomorrow  -VTE prophylaxis: SCDs, lovenox   -Bowel regimen: senna, miralax; increased miralax, added dulcolax, LBM:  4/11  -Discussed case w/ Dr. Samuel

## 2024-04-14 NOTE — PROGRESS NOTE ADULT - ASSESSMENT
69 y/o male who is right hand dominate, every day smoker 1 1/2 pack a day,  HTN(Not on any medication), bipolar disorder, depression, anxiety, COPD, cervical radiculopathy, he had incidental cerebral aneurysms findings recently discovered on imaging by neurology due to hand tremors. 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  Pt underwent diagnostic cerebral angiogram which confirmed R A2 aneurysm 2.4mm and L cavernous ICA aneurysm 5x6 mm, he came in here for elective s/p R craniotomy for interhemispheric clipping of aneurysm w/ intraop angio on 4/12/24 with Dr. Samuel s/p procedure, he tolerated the procedure well, post operatively he was noted to have slightly increase in creatinine from 1.2 to 1.4, medicine consulted for medical management.     Plan:     Cerebral Aneurysm: s/p s/p R craniotomy for interhemispheric clipping of aneurysm w/ intraop angio with Dr. Samuel, POD 2,  - post op imaging done   -pain meds   -IS   -wound care   -neuro checks  -further management as per primary team     Hx of COPD: will continue with Symbicort 80mcg BID,  DUONEB PRN for COPD exacerbation  - SpO2 goal > 92%  - Incentive spirometry    Hx of Smoking: counselling for cessation, nicotine patches     Hx of HTN: will continue with Propranolol 10 BID with holding parameters   - PRN Hydralazine    CKD II: avoid nephrotoxics. monitor intake and output.       DVT prophylaxis as per primary team   - VTE prophylaxis: SCDs    Hx of Anxiety and Bipolar disorder: will continue with BuSpar Dividose 15 mg 2 times a day, LaMICtal 100 mg once a day, CeleXA 20 mg once a day.     Restless leg syndrome: will continue with  rOPINIRole 0.25 mg once a day.     d/w neurosurgery PA.

## 2024-04-15 ENCOUNTER — TRANSCRIPTION ENCOUNTER (OUTPATIENT)
Age: 69
End: 2024-04-15

## 2024-04-15 VITALS
RESPIRATION RATE: 18 BRPM | TEMPERATURE: 98 F | SYSTOLIC BLOOD PRESSURE: 127 MMHG | DIASTOLIC BLOOD PRESSURE: 72 MMHG | OXYGEN SATURATION: 98 % | HEART RATE: 67 BPM

## 2024-04-15 LAB
ANION GAP SERPL CALC-SCNC: 12 MMOL/L — SIGNIFICANT CHANGE UP (ref 5–17)
BUN SERPL-MCNC: 11.9 MG/DL — SIGNIFICANT CHANGE UP (ref 8–20)
CALCIUM SERPL-MCNC: 8.4 MG/DL — SIGNIFICANT CHANGE UP (ref 8.4–10.5)
CHLORIDE SERPL-SCNC: 102 MMOL/L — SIGNIFICANT CHANGE UP (ref 96–108)
CO2 SERPL-SCNC: 25 MMOL/L — SIGNIFICANT CHANGE UP (ref 22–29)
CREAT SERPL-MCNC: 1.21 MG/DL — SIGNIFICANT CHANGE UP (ref 0.5–1.3)
EGFR: 65 ML/MIN/1.73M2 — SIGNIFICANT CHANGE UP
GLUCOSE SERPL-MCNC: 87 MG/DL — SIGNIFICANT CHANGE UP (ref 70–99)
HCT VFR BLD CALC: 38.2 % — LOW (ref 39–50)
HGB BLD-MCNC: 13.1 G/DL — SIGNIFICANT CHANGE UP (ref 13–17)
MAGNESIUM SERPL-MCNC: 1.9 MG/DL — SIGNIFICANT CHANGE UP (ref 1.6–2.6)
MCHC RBC-ENTMCNC: 31 PG — SIGNIFICANT CHANGE UP (ref 27–34)
MCHC RBC-ENTMCNC: 34.3 GM/DL — SIGNIFICANT CHANGE UP (ref 32–36)
MCV RBC AUTO: 90.3 FL — SIGNIFICANT CHANGE UP (ref 80–100)
PHOSPHATE SERPL-MCNC: 2.9 MG/DL — SIGNIFICANT CHANGE UP (ref 2.4–4.7)
PLATELET # BLD AUTO: 271 K/UL — SIGNIFICANT CHANGE UP (ref 150–400)
POTASSIUM SERPL-MCNC: 4.2 MMOL/L — SIGNIFICANT CHANGE UP (ref 3.5–5.3)
POTASSIUM SERPL-SCNC: 4.2 MMOL/L — SIGNIFICANT CHANGE UP (ref 3.5–5.3)
RBC # BLD: 4.23 M/UL — SIGNIFICANT CHANGE UP (ref 4.2–5.8)
RBC # FLD: 13.8 % — SIGNIFICANT CHANGE UP (ref 10.3–14.5)
SODIUM SERPL-SCNC: 139 MMOL/L — SIGNIFICANT CHANGE UP (ref 135–145)
WBC # BLD: 8.62 K/UL — SIGNIFICANT CHANGE UP (ref 3.8–10.5)
WBC # FLD AUTO: 8.62 K/UL — SIGNIFICANT CHANGE UP (ref 3.8–10.5)

## 2024-04-15 PROCEDURE — 83735 ASSAY OF MAGNESIUM: CPT

## 2024-04-15 PROCEDURE — C1894: CPT

## 2024-04-15 PROCEDURE — 94640 AIRWAY INHALATION TREATMENT: CPT

## 2024-04-15 PROCEDURE — C1887: CPT

## 2024-04-15 PROCEDURE — 36224 PLACE CATH CAROTD ART: CPT

## 2024-04-15 PROCEDURE — 99232 SBSQ HOSP IP/OBS MODERATE 35: CPT

## 2024-04-15 PROCEDURE — 36415 COLL VENOUS BLD VENIPUNCTURE: CPT

## 2024-04-15 PROCEDURE — C9399: CPT

## 2024-04-15 PROCEDURE — 85027 COMPLETE CBC AUTOMATED: CPT

## 2024-04-15 PROCEDURE — 70450 CT HEAD/BRAIN W/O DYE: CPT | Mod: MC

## 2024-04-15 PROCEDURE — 84100 ASSAY OF PHOSPHORUS: CPT

## 2024-04-15 PROCEDURE — C1889: CPT

## 2024-04-15 PROCEDURE — C1769: CPT

## 2024-04-15 PROCEDURE — 70496 CT ANGIOGRAPHY HEAD: CPT | Mod: MC

## 2024-04-15 PROCEDURE — 80048 BASIC METABOLIC PNL TOTAL CA: CPT

## 2024-04-15 PROCEDURE — C1760: CPT

## 2024-04-15 RX ORDER — OXYCODONE HYDROCHLORIDE 5 MG/1
1 TABLET ORAL
Qty: 28 | Refills: 0
Start: 2024-04-15 | End: 2024-04-21

## 2024-04-15 RX ORDER — TAMSULOSIN HYDROCHLORIDE 0.4 MG/1
1 CAPSULE ORAL
Qty: 30 | Refills: 0
Start: 2024-04-15

## 2024-04-15 RX ORDER — ACETAMINOPHEN 500 MG
2 TABLET ORAL
Qty: 0 | Refills: 0 | DISCHARGE
Start: 2024-04-15

## 2024-04-15 RX ORDER — LEVETIRACETAM 250 MG/1
1 TABLET, FILM COATED ORAL
Qty: 60 | Refills: 0
Start: 2024-04-15

## 2024-04-15 RX ORDER — MULTIVIT WITH MIN/MFOLATE/K2 340-15/3 G
296 POWDER (GRAM) ORAL ONCE
Refills: 0 | Status: COMPLETED | OUTPATIENT
Start: 2024-04-15 | End: 2024-04-15

## 2024-04-15 RX ORDER — OXYCODONE HYDROCHLORIDE 5 MG/1
1 TABLET ORAL
Qty: 42 | Refills: 0
Start: 2024-04-15 | End: 2024-04-21

## 2024-04-15 RX ORDER — SENNA PLUS 8.6 MG/1
2 TABLET ORAL
Qty: 0 | Refills: 0 | DISCHARGE
Start: 2024-04-15

## 2024-04-15 RX ORDER — OXYCODONE HYDROCHLORIDE 5 MG/1
1 TABLET ORAL
Qty: 0 | Refills: 0 | DISCHARGE
Start: 2024-04-15

## 2024-04-15 RX ADMIN — Medication 15 MILLIGRAM(S): at 06:10

## 2024-04-15 RX ADMIN — POLYETHYLENE GLYCOL 3350 17 GRAM(S): 17 POWDER, FOR SOLUTION ORAL at 06:12

## 2024-04-15 RX ADMIN — SODIUM CHLORIDE 3 MILLILITER(S): 9 INJECTION INTRAMUSCULAR; INTRAVENOUS; SUBCUTANEOUS at 05:04

## 2024-04-15 RX ADMIN — LEVETIRACETAM 500 MILLIGRAM(S): 250 TABLET, FILM COATED ORAL at 08:36

## 2024-04-15 RX ADMIN — Medication 5 MILLIGRAM(S): at 06:10

## 2024-04-15 RX ADMIN — BUDESONIDE AND FORMOTEROL FUMARATE DIHYDRATE 2 PUFF(S): 160; 4.5 AEROSOL RESPIRATORY (INHALATION) at 08:26

## 2024-04-15 NOTE — PROGRESS NOTE ADULT - ASSESSMENT
69 year old male s/p right craniotomy and clipping of pericollasal aneurysm. Urinary retention     patient neurosurgically stable for dc home  cleared by Dr. Samuel  urology placed coudet catheter for urinary retention and started flomax.   patient to follow up with Dr. Tejeda and continue flomax upon discharge with gonzalez and have TOV with urology as outpt.   will follow up with Dr. Samuel in 2 weeks.

## 2024-04-15 NOTE — DISCHARGE NOTE NURSING/CASE MANAGEMENT/SOCIAL WORK - PATIENT PORTAL LINK FT
You can access the FollowMyHealth Patient Portal offered by [facility] by registering at the following website: [email or web address]. By joining [employer]’s FollowMyHealth portal, you will also be able to view your health information using other applications (apps) compatible with our system.

## 2024-04-15 NOTE — DISCHARGE NOTE PROVIDER - NSDCCPTREATMENT_GEN_ALL_CORE_FT
PRINCIPAL PROCEDURE  Procedure: Brain aneurysm surgery  Findings and Treatment: R A2 aneurysm clipping

## 2024-04-15 NOTE — PROGRESS NOTE ADULT - ASSESSMENT
67 y/o male who is right hand dominate, every day smoker 1 1/2 pack a day,  HTN(Not on any medication), bipolar disorder, depression, anxiety, COPD, cervical radiculopathy, he had incidental cerebral aneurysms findings recently discovered on imaging by neurology due to hand tremors. 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  Pt underwent diagnostic cerebral angiogram which confirmed R A2 aneurysm 2.4mm and L cavernous ICA aneurysm 5x6 mm, he came in here for elective s/p R craniotomy for interhemispheric clipping of aneurysm w/ intraop angio on 4/12/24 with Dr. Samuel s/p procedure, he tolerated the procedure well, post operatively he was noted to have slightly increase in creatinine from 1.2 to 1.4, medicine consulted for medical management.     Plan:     Cerebral Aneurysm: s/p s/p R craniotomy for interhemispheric clipping of aneurysm w/ intraop angio with Dr. Samuel, POD 3:   - post op imaging done   -pain meds   -IS   -wound care   -neuro checks  -further management as per primary team     Hx of COPD: will continue with Symbicort 80mcg BID,  DUONEB PRN for COPD exacerbation  - SpO2 goal > 92%  - Incentive spirometry    Hx of Smoking: counselling for cessation, nicotine patches    Hx of HTN: will continue with Propranolol 10 BID with holding parameters   - PRN Hydralazine    CKD II: avoid nephrotoxics: creatinine came down to baseline, monitor intake and output.       DVT prophylaxis as per primary team   - VTE prophylaxis: SCDs    Hx of Anxiety and Bipolar disorder: will continue with BuSpar Dividose 15 mg 2 times a day, LaMICtal 100 mg once a day, CeleXA 20 mg once a day.     Restless leg syndrome: will continue with  rOPINIRole 0.25 mg once a day.     Medicine team is singing off, please call as needed.

## 2024-04-15 NOTE — DISCHARGE NOTE PROVIDER - NSDCMRMEDTOKEN_GEN_ALL_CORE_FT
acetaminophen 325 mg oral tablet: 2 tab(s) orally every 6 hours As needed Temp greater or equal to 38C (100.4F), Mild Pain (1 - 3)  Anoro Ellipta 62.5 mcg-25 mcg/inh inhalation powder: 1 puff(s) inhaled once a day  bisacodyl 5 mg oral delayed release tablet: 1 tab(s) orally every 12 hours  Breo Ellipta 50 mcg-25 mcg/inh. inhalation powder: 1 puff(s) inhaled once a day  BuSpar Dividose 15 mg oral tablet: 1 tab(s) orally 2 times a day  CeleXA 20 mg oral tablet: 1 tab(s) orally once a day  LaMICtal 100 mg oral tablet: 1 tab(s) orally once a day  levETIRAcetam 500 mg oral tablet: 1 tab(s) orally every 12 hours  oxyCODONE 5 mg oral tablet: 1 tab(s) orally every 6 hours as needed for  severe pain MDD: 8  propranolol 10 mg oral tablet: 1 tab(s) orally 2 times a day  rOPINIRole 0.25 mg oral tablet: 1 tab(s) orally once a day (at bedtime)  senna leaf extract oral tablet: 2 tab(s) orally once a day (at bedtime)  tamsulosin 0.4 mg oral capsule: 1 cap(s) orally once a day (at bedtime)   acetaminophen 325 mg oral tablet: 2 tab(s) orally every 6 hours As needed Temp greater or equal to 38C (100.4F), Mild Pain (1 - 3)  Anoro Ellipta 62.5 mcg-25 mcg/inh inhalation powder: 1 puff(s) inhaled once a day  bisacodyl 5 mg oral delayed release tablet: 1 tab(s) orally every 12 hours  Breo Ellipta 50 mcg-25 mcg/inh. inhalation powder: 1 puff(s) inhaled once a day  BuSpar Dividose 15 mg oral tablet: 1 tab(s) orally 2 times a day  CeleXA 20 mg oral tablet: 1 tab(s) orally once a day  LaMICtal 100 mg oral tablet: 1 tab(s) orally once a day  levETIRAcetam 500 mg oral tablet: 1 tab(s) orally every 12 hours  oxyCODONE 5 mg oral tablet: 1 tab(s) orally every 4 hours As needed Moderate Pain (4 - 6)  propranolol 10 mg oral tablet: 1 tab(s) orally 2 times a day  rOPINIRole 0.25 mg oral tablet: 1 tab(s) orally once a day (at bedtime)  senna leaf extract oral tablet: 2 tab(s) orally once a day (at bedtime)  tamsulosin 0.4 mg oral capsule: 1 cap(s) orally once a day (at bedtime)

## 2024-04-15 NOTE — DISCHARGE NOTE PROVIDER - CARE PROVIDER_API CALL
Durga Samuelul  Neurosurgery  [address] [place] [id number]  Phone: [phone]  Fax: [phone]  Follow Up Time: 2 weeks    Kwame Tejeda  Urology  [address], [address]  [place] [address]  Phone: [phone]  Fax: [phone]  Follow Up Time:

## 2024-04-15 NOTE — DISCHARGE NOTE PROVIDER - HOSPITAL COURSE
69 year old male Pt right hand dominate, presented for right Craniotomy interhemispheric approach for cerebral aneurysms. Pt reports incidental cerebral aneurysms findings recently discovered on imaging by neurology due to hand tremors. 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 Pt medical hx includes current every day smoker 1 1/2 pack a day,  HTN(Not on any medication), bipolar disorder, depression, anxiety, COPD, cervical radiculopathy.  Pt denies headaches, denies change in vision, denies fever/chills and s/s of symptoms and any other related issues.     INTERVAL Events during hospitalizations:   Pt is POD#3 s/p bifrontal crani for R pericallosal aneurysm clipping. Was having issues w/ urinary retention so gonzalez reinserted seen by urology. Pt seen and examined this morning. Denies any acute complaints. Had BM 4/15. Neurologically intact. Will dc with gonzalez and follow up with urology and neurosurgery.

## 2024-04-15 NOTE — PROGRESS NOTE ADULT - SUBJECTIVE AND OBJECTIVE BOX
EDWARD WHITMAN    [id number]    69y      Male    Patient is a 69y old  Male who presents with a chief complaint of R pericallosal aneurysm clipping (14 Apr 2024 16:57)      INTERVAL HPI/OVERNIGHT EVENTS:      Patient is doing ok, denies fever, chills, chest pain, nausea, vomiting     Vital Signs Last 24 Hrs  T(C): 36.8 (15 Apr 2024 08:28), Max: 37.1 (14 Apr 2024 11:49)  T(F): 98.3 (15 Apr 2024 08:28), Max: 98.8 (14 Apr 2024 11:49)  HR: 67 (15 Apr 2024 08:28) (57 - 80)  BP: 127/72 (15 Apr 2024 08:28) (127/72 - 157/72)  BP(mean): 102 (14 Apr 2024 18:45) (95 - 102)  RR: 18 (15 Apr 2024 08:28) (16 - 25)  SpO2: 98% (15 Apr 2024 08:28) (95% - 98%)    Parameters below as of 15 Apr 2024 08:28  Patient On (Oxygen Delivery Method): room air        PHYSICAL EXAM:    GENERAL: Elderly male looking comfortable   HEENT: right side cranial dressings  NECK: soft, Supple, No JVD  CHEST/LUNG: Clear to auscultate bilaterally; No wheezing  HEART: S1S2+, Regular rate and rhythm; No murmurs  ABDOMEN: Soft, Nontender, Nondistended; Bowel sounds present  EXTREMITIES:  1+ Peripheral Pulses, No edema  SKIN: No rashes or lesions  NEURO: AAOX3  PSYCH: normal mood      LABS:                        13.1   8.62  )-----------( 271      ( 15 Apr 2024 04:45 )             38.2     04-15    139  |  102  |  11.9  ----------------------------<  87  4.2   |  25.0  |  1.21    Ca    8.4      15 Apr 2024 04:45  Phos  2.9     04-15  Mg     1.9     04-15        I&O's Summary    14 Apr 2024 07:01  -  15 Apr 2024 07:00  --------------------------------------------------------  IN: 560 mL / OUT: 2685 mL / NET: -2125 mL        MEDICATIONS  (STANDING):  bisacodyl 5 milliGRAM(s) Oral every 12 hours  budesonide  80 MICROgram(s)/formoterol 4.5 MICROgram(s) Inhaler 2 Puff(s) Inhalation two times a day  busPIRone 15 milliGRAM(s) Oral two times a day  chlorhexidine 2% Cloths 1 Application(s) Topical daily  citalopram 20 milliGRAM(s) Oral daily  enoxaparin Injectable 40 milliGRAM(s) SubCutaneous every 24 hours  influenza  Vaccine (HIGH DOSE) 0.7 milliLiter(s) IntraMuscular once  lamoTRIgine 100 milliGRAM(s) Oral daily  levETIRAcetam 500 milliGRAM(s) Oral every 12 hours  magnesium citrate Oral Solution 296 milliLiter(s) Oral once  nicotine - 21 mG/24Hr(s) Patch 1 Patch Transdermal daily  polyethylene glycol 3350 17 Gram(s) Oral two times a day  propranolol 10 milliGRAM(s) Oral two times a day  rOPINIRole 0.25 milliGRAM(s) Oral at bedtime  senna 2 Tablet(s) Oral at bedtime  sodium bicarbonate  Infusion 0.251 mEq/kG/Hr (100 mL/Hr) IV Continuous <Continuous>  sodium chloride 0.9% lock flush 3 milliLiter(s) IV Push every 8 hours  tamsulosin 0.4 milliGRAM(s) Oral at bedtime  umeclidinium 62.5 MICROgram(s)/vilanterol 25 MICROgram(s) Inhaler 1 Puff(s) Inhalation daily    MEDICATIONS  (PRN):  acetaminophen     Tablet .. 650 milliGRAM(s) Oral every 6 hours PRN Temp greater or equal to 38C (100.4F), Mild Pain (1 - 3)  albuterol/ipratropium for Nebulization 3 milliLiter(s) Nebulizer every 6 hours PRN Shortness of Breath and/or Wheezing  hydrALAZINE Injectable 10 milliGRAM(s) IV Push every 2 hours PRN SBP > 160mm Hg  HYDROmorphone  Injectable 0.5 milliGRAM(s) IV Push every 6 hours PRN Severe Pain (7 - 10)  ondansetron Injectable 4 milliGRAM(s) IV Push every 6 hours PRN Nausea and/or Vomiting  oxyCODONE    IR 5 milliGRAM(s) Oral every 4 hours PRN Moderate Pain (4 - 6)  oxyCODONE    IR 10 milliGRAM(s) Oral every 4 hours PRN Severe Pain (7 - 10)        
Patient is a 69y old  Male who presents with a chief complaint of cerebral aneurysm clipping (12 Apr 2024 13:42)      INTERVAL History of Present Illness/OVERNIGHT EVENTS: stable    MEDICATIONS  (STANDING):  budesonide  80 MICROgram(s)/formoterol 4.5 MICROgram(s) Inhaler 2 Puff(s) Inhalation two times a day  busPIRone 15 milliGRAM(s) Oral two times a day  chlorhexidine 2% Cloths 1 Application(s) Topical daily  citalopram 20 milliGRAM(s) Oral daily  influenza  Vaccine (HIGH DOSE) 0.7 milliLiter(s) IntraMuscular once  lamoTRIgine 100 milliGRAM(s) Oral daily  levETIRAcetam 500 milliGRAM(s) Oral every 12 hours  nicotine - 21 mG/24Hr(s) Patch 1 Patch Transdermal daily  polyethylene glycol 3350 17 Gram(s) Oral daily  propranolol 10 milliGRAM(s) Oral two times a day  rOPINIRole 0.25 milliGRAM(s) Oral at bedtime  senna 2 Tablet(s) Oral at bedtime  sodium bicarbonate  Infusion 0.251 mEq/kG/Hr (100 mL/Hr) IV Continuous <Continuous>  sodium chloride 0.9% lock flush 3 milliLiter(s) IV Push every 8 hours  tamsulosin 0.4 milliGRAM(s) Oral at bedtime  umeclidinium 62.5 MICROgram(s)/vilanterol 25 MICROgram(s) Inhaler 1 Puff(s) Inhalation daily    MEDICATIONS  (PRN):  acetaminophen     Tablet .. 650 milliGRAM(s) Oral every 6 hours PRN Temp greater or equal to 38C (100.4F), Mild Pain (1 - 3)  albuterol/ipratropium for Nebulization 3 milliLiter(s) Nebulizer every 6 hours PRN Shortness of Breath and/or Wheezing  hydrALAZINE Injectable 10 milliGRAM(s) IV Push every 2 hours PRN SBP > 160mm Hg  HYDROmorphone  Injectable 0.5 milliGRAM(s) IV Push every 6 hours PRN Severe Pain (7 - 10)  ondansetron Injectable 4 milliGRAM(s) IV Push every 6 hours PRN Nausea and/or Vomiting  oxyCODONE    IR 5 milliGRAM(s) Oral every 4 hours PRN Moderate Pain (4 - 6)  oxyCODONE    IR 10 milliGRAM(s) Oral every 4 hours PRN Severe Pain (7 - 10)      Allergies    No Known Allergies    Intolerances        REVIEW OF SYSTEMS:  Other systems currently negative unless otherwise specified above.    Vital Signs Last 24 Hrs  T(C): 37.1 (14 Apr 2024 11:49), Max: 37.1 (14 Apr 2024 11:49)  T(F): 98.8 (14 Apr 2024 11:49), Max: 98.8 (14 Apr 2024 11:49)  HR: 73 (14 Apr 2024 11:00) (48 - 74)  BP: 150/76 (14 Apr 2024 11:00) (114/63 - 156/73)  BP(mean): 97 (14 Apr 2024 11:00) (76 - 107)  RR: 16 (14 Apr 2024 11:00) (14 - 24)  SpO2: 96% (14 Apr 2024 11:00) (93% - 100%)    Parameters below as of 14 Apr 2024 08:00  Patient On (Oxygen Delivery Method): room air            PHYSICAL EXAM:  GENERAL: No apparent distress, appears stated age  EYES: Conjunctiva and sclera clear, no discharge  ENMT: Moist mucous membranes, no nasal discharge  CHEST/LUNG: Clear to auscultation bilaterally, no wheeze or rales  HEART: Regular rhythm, no rubs or gallops  ABDOMEN: Soft, Nontender, Nondistended  EXTREMITIES:  No clubbing, cyanosis or edema  Craniotomy scar      LABS:                        12.3   10.44 )-----------( 246      ( 14 Apr 2024 04:37 )             36.1     14 Apr 2024 04:37    139    |  103    |  14.1   ----------------------------<  98     3.7     |  26.0   |  1.19     Ca    8.1        14 Apr 2024 04:37  Phos  1.9       14 Apr 2024 04:37  Mg     1.9       14 Apr 2024 04:37        Urinalysis Basic - ( 14 Apr 2024 04:37 )    Color: x / Appearance: x / SG: x / pH: x  Gluc: 98 mg/dL / Ketone: x  / Bili: x / Urobili: x   Blood: x / Protein: x / Nitrite: x   Leuk Esterase: x / RBC: x / WBC x   Sq Epi: x / Non Sq Epi: x / Bacteria: x      CAPILLARY BLOOD GLUCOSE            RADIOLOGY & ADDITIONAL TESTS:      Images reviewed personally    Consultant Notes Reviewed and Care Discussed with relevant Consultants.
HPI:  Pt is  a 68 year old male, Pt right hand dominate, seen today pre-op for Right Craniotomy interhemispheric approach for cerebral aneurisms. Pt reports incidental cerebral aneurysms findings recently discovered on imaging by neurology due to hand tremors. 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 Pt medical hx includes current every day smoker 1 1/2 pack a day,  HTN(Not on any medication), bipolar disorder, depression, anxiety, COPD, cervical radiculopathy.  Pt denies headaches, denies change in vision, denies fever/chills and s/s of symptoms and any other related issues. Pt scheduled for this surgery on 4/12/24 with Dr. Samuel    (25 Mar 2024 17:19)    INTERVAL HPI/OVERNIGHT EVENTS:  Pt is POD#2 s/p bifrontal crani for R pericallosal aneurysm clipping. Pt downgraded from ICU yesterday. Was having issues w/ urinary retention so gonzalez reinserted yesterday. Pt seen and examined this morning. Denies any acute complaints.     Vital Signs Last 24 Hrs  T(C): 37.1 (04-14-24 @ 11:49), Max: 37.1 (04-14-24 @ 11:49)  T(F): 98.8 (04-14-24 @ 11:49), Max: 98.8 (04-14-24 @ 11:49)  HR: 77 (04-14-24 @ 13:00) (52 - 77)  BP: 157/72 (04-14-24 @ 13:00) (125/64 - 157/72)  BP(mean): 99 (04-14-24 @ 13:00) (81 - 107)  RR: 25 (04-14-24 @ 13:00) (14 - 25)  SpO2: 96% (04-14-24 @ 13:00) (93% - 100%)    PHYSICAL EXAM:    GENERAL: NAD    INCISION: Bifrontal incision, sutures in place, c/d/i  HEAD: normocephalic   MENTAL STATUS: AAO x 3, conversant, following simple commands    CRANIAL NERVES: PERRL, EOMI without nystagmus. Face symmetric, Tongue is midline. Hearing grossly intact. Head turning and shoulder shrug intact.    REFLEXES: No pronator drift   MOTOR: strength 5/5 b/l upper and lower extremities   SENSATION: grossly intact to light touch all extremities   SKIN: Warm, dry    LABS:                        12.3   10.44 )-----------( 246      ( 14 Apr 2024 04:37 )             36.1     04-14    139  |  103  |  14.1  ----------------------------<  98  3.7   |  26.0  |  1.19    Ca    8.1<L>      14 Apr 2024 04:37  Phos  1.9     04-14  Mg     1.9     04-14      Urinalysis Basic - ( 14 Apr 2024 04:37 )    Color: x / Appearance: x / SG: x / pH: x  Gluc: 98 mg/dL / Ketone: x  / Bili: x / Urobili: x   Blood: x / Protein: x / Nitrite: x   Leuk Esterase: x / RBC: x / WBC x   Sq Epi: x / Non Sq Epi: x / Bacteria: x      RADIOLOGY & ADDITIONAL TESTS:  CT Head No Cont (04.14.24 @ 08:31)  IMPRESSION:    Right frontal craniotomy. Stable trace right parafalcine subdural blood   products. Interval decreased pneumocephalus. No midline shift.    
HPI: 69 year old male POD #3 s/p right craniotomy and aneurysm clipping. Doing well had BM this morning. Had failed TOV and urology placed coudet catheter patient draining well.     Vital Signs Last 24 Hrs  T(C): 36.8 (15 Apr 2024 08:28), Max: 36.8 (14 Apr 2024 22:03)  T(F): 98.3 (15 Apr 2024 08:28), Max: 98.3 (15 Apr 2024 08:28)  HR: 67 (15 Apr 2024 08:28) (57 - 80)  BP: 127/72 (15 Apr 2024 08:28) (127/72 - 157/72)  BP(mean): 102 (14 Apr 2024 18:45) (95 - 102)  RR: 18 (15 Apr 2024 08:28) (16 - 25)  SpO2: 98% (15 Apr 2024 08:28) (95% - 98%)    Parameters below as of 15 Apr 2024 08:28  Patient On (Oxygen Delivery Method): room air        MEDICATIONS  (STANDING):  bisacodyl 5 milliGRAM(s) Oral every 12 hours  budesonide  80 MICROgram(s)/formoterol 4.5 MICROgram(s) Inhaler 2 Puff(s) Inhalation two times a day  busPIRone 15 milliGRAM(s) Oral two times a day  chlorhexidine 2% Cloths 1 Application(s) Topical daily  citalopram 20 milliGRAM(s) Oral daily  enoxaparin Injectable 40 milliGRAM(s) SubCutaneous every 24 hours  influenza  Vaccine (HIGH DOSE) 0.7 milliLiter(s) IntraMuscular once  lamoTRIgine 100 milliGRAM(s) Oral daily  levETIRAcetam 500 milliGRAM(s) Oral every 12 hours  magnesium citrate Oral Solution 296 milliLiter(s) Oral once  nicotine - 21 mG/24Hr(s) Patch 1 Patch Transdermal daily  polyethylene glycol 3350 17 Gram(s) Oral two times a day  propranolol 10 milliGRAM(s) Oral two times a day  rOPINIRole 0.25 milliGRAM(s) Oral at bedtime  senna 2 Tablet(s) Oral at bedtime  sodium bicarbonate  Infusion 0.251 mEq/kG/Hr (100 mL/Hr) IV Continuous <Continuous>  sodium chloride 0.9% lock flush 3 milliLiter(s) IV Push every 8 hours  tamsulosin 0.4 milliGRAM(s) Oral at bedtime  umeclidinium 62.5 MICROgram(s)/vilanterol 25 MICROgram(s) Inhaler 1 Puff(s) Inhalation daily    MEDICATIONS  (PRN):  acetaminophen     Tablet .. 650 milliGRAM(s) Oral every 6 hours PRN Temp greater or equal to 38C (100.4F), Mild Pain (1 - 3)  albuterol/ipratropium for Nebulization 3 milliLiter(s) Nebulizer every 6 hours PRN Shortness of Breath and/or Wheezing  hydrALAZINE Injectable 10 milliGRAM(s) IV Push every 2 hours PRN SBP > 160mm Hg  HYDROmorphone  Injectable 0.5 milliGRAM(s) IV Push every 6 hours PRN Severe Pain (7 - 10)  ondansetron Injectable 4 milliGRAM(s) IV Push every 6 hours PRN Nausea and/or Vomiting  oxyCODONE    IR 5 milliGRAM(s) Oral every 4 hours PRN Moderate Pain (4 - 6)  oxyCODONE    IR 10 milliGRAM(s) Oral every 4 hours PRN Severe Pain (7 - 10)      PHYSICAL EXAM:      Constitutional: awake and alert.  HEENT: PERRLA, EOMI,   Neck: Supple.  Respiratory: Breath sounds are clear bilaterally  Cardiovascular: S1 and S2, regular / irregular rhythm  Gastrointestinal: soft, nontender  Extremities:  no edema   16fr gonzalez in place.   Musculoskeletal: no joint swelling/tenderness, no abnormal movements  Skin: No rashes    Neurological exam:  HF: A x O x 3. Appropriately interactive, normal affect. Speech fluent, No Aphasia or paraphasic errors. Naming /repetition intact   CN: JOSÉ MIGUEL, EOMI, VFF, facial sensation normal, no NLFD, tongue midline, Palate moves equally, SCM equal bilaterally  Motor: No pronator drift, Strength 5/5 in all 4 ext, normal bulk and tone, no tremor, rigidity or bradykinesia.    Sens: Intact to light touch / PP/ VS/ JS    Reflexes: Symmetric and normal .  Incision site c/d/i         LABS:                         13.1   8.62  )-----------( 271      ( 15 Apr 2024 04:45 )             38.2     04-15    139  |  102  |  11.9  ----------------------------<  87  4.2   |  25.0  |  1.21    Ca    8.4      15 Apr 2024 04:45  Phos  2.9     04-15  Mg     1.9     04-15        RADIOLOGY:  .< from: CT Head No Cont (04.14.24 @ 08:31) >    IMPRESSION:    Right frontal craniotomy. Stable trace right parafalcine subdural blood   products. Interval decreased pneumocephalus. No midline shift.      < end of copied text >  
HPI:  Pt is  a 68 year old male, Pt right hand dominate, seen today pre-op for Right Craniotomy interhemispheric approach for cerebral aneurisms. Pt reports incidental cerebral aneurysms findings recently discovered on imaging by neurology due to hand tremors. 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 Pt medical hx includes current every day smoker 1 1/2 pack a day,  HTN(Not on any medication), bipolar disorder, depression, anxiety, COPD, cervical radiculopathy.  Pt denies headaches, denies change in vision, denies fever/chills and s/s of symptoms and any other related issues. Pt scheduled for this surgery on 4/12/24 with Dr. Samuel    (25 Mar 2024 17:19)    INTERVAL HPI  4/12 Right Craniotomy for aneurysm clipping     OVERNIGHT EVENTS:  None neurosurgical     70 yo male seen and examined in bed, NAD. Patient denies any headaches, changes in speech, weakness or numbness in his extremities.     Vital Signs Last 24 Hrs  T(C): 36.3 (13 Apr 2024 01:00), Max: 36.7 (12 Apr 2024 16:00)  T(F): 97.3 (13 Apr 2024 01:00), Max: 98.1 (12 Apr 2024 16:00)  HR: 56 (13 Apr 2024 01:00) (54 - 74)  BP: 112/64 (13 Apr 2024 00:00) (90/62 - 167/82)  BP(mean): 77 (13 Apr 2024 00:00) (71 - 103)  RR: 16 (13 Apr 2024 01:00) (12 - 22)  SpO2: 97% (13 Apr 2024 01:00) (94% - 99%)    Parameters below as of 13 Apr 2024 00:00  Patient On (Oxygen Delivery Method): room air    PHYSICAL EXAM:  GENERAL: NAD  HEAD: Head wrap in place, drain in place and draining well   WOUND: Dressing clean dry intact  MENTAL STATUS: AAO x3; Awake, Opens eyes spontaneously, Appropriately conversant without aphasia, following simple commands  CRANIAL NERVES: PERRLA; EOMI, No facial asymmetry; facial sensation grossly intact to light touch b/l;  tongue midline; palate rises symmetrically  MOTOR: strength 5/5 B/L upper and lower extremities; sensation grossly intact all extremities; No pronator drift   Right Groin site clean, dry and intact   SKIN: Warm, dry; no rashes or lesions    LABS:  04-12 @ 07:01  -  04-13 @ 02:07  --------------------------------------------------------  IN: 1127.5 mL / OUT: 1935 mL / NET: -807.5 mL    RADIOLOGY & ADDITIONAL TESTS:   PO CTH/CTA pending 
POST-OPERATIVE NOTE    Procedure: bifrontal craniotomy interhemispheric approach R A2 aneurysm clipping, s/p 2 clips    Diagnosis/Indication: R A2 aneurysm found incidentally    Surgeon: Dr. Durga Samuel    INTERVAL HPI/ACUTE EVENTS:  69yMale PMH admitted to NSICU now s/p bifrontal craniotomy interhemispheric approach R A2 aneurysm clipping, s/p 2 clips  POD#0. Patient seen lying comfortably in bed. Denies CP, SOB, MITCHELL, calf tenderness. Pain controlled with medication.    VITALS:  T(C): 34.8 (04-12-24 @ 13:30), Max: 36.1 (04-12-24 @ 05:51)  HR: 69 (04-12-24 @ 13:30) (54 - 69)  BP: 125/64 (04-12-24 @ 13:30) (125/64 - 167/82)  RR: 22 (04-12-24 @ 13:30) (16 - 22)  SpO2: 99% (04-12-24 @ 13:30) (97% - 99%)  Wt(kg): --    PHYSICAL EXAM:  GENERAL: NAD well-developed  HEAD:  S/p bifrontal  crani. Dressing clean, dry, intact.   DRAINS: Subgaleal to full cution hemovac. Serosanguinous drainage noted.  JIM COMA SCORE: E-4 V-5 M-6 =15  MENTAL STATUS: AAO x3; Awake; Opens eyes spontaneously; Appropriately conversant without aphasia; following simple commands  CRANIAL NERVES: Visual acuity normal for age, visual fields full to confrontation, PERRL. EOMI without nystagmus. Facial sensation intact V1-3 distribution b/l. Face symmetric w/ normal eye closure and smile, tongue midline. Hearing grossly intact. Speech clear. Head turning and shoulder shrug intact.   MOTOR: strength 5/5 b/l upper and lower extremities  SENSATION: grossly intact to light touch all extremities  CHEST/LUNG: nonlabored breathing, no accessory muscle use  ABDOMEN: Soft, nontender, nondistended

## 2024-04-15 NOTE — DISCHARGE NOTE NURSING/CASE MANAGEMENT/SOCIAL WORK - NSDCPEFALRISK_GEN_ALL_CORE
For information on Fall & Injury Prevention, visit: [email or web address] OR  [email or web address] OR  https://www.cdc.gov/steadi/patient.html

## 2024-04-15 NOTE — DISCHARGE NOTE PROVIDER - NSDCCPCAREPLAN_GEN_ALL_CORE_FT
PRINCIPAL DISCHARGE DIAGNOSIS  Diagnosis: Nonruptured cerebral aneurysm  Assessment and Plan of Treatment:

## 2024-04-15 NOTE — DISCHARGE NOTE PROVIDER - NSDCFUADDINST_GEN_ALL_CORE_FT
1. You must wash your hair starting 24 hours after being home. Use your normal  shampoo. During the shampoo, massage the staples to remove any dried blood  or crusting. This keeps your wound clean and helps healing. You should shampoo everyday.  2. Mild swelling around the incision is common. Keep incision open to air and dry.  3. Call our office at [phone] to set up the appointment with an NP for wound check  once you get home.  4. Inform the doctor immediately if you have a fever (above 101), chills, night  sweats, wound drainage, increasing wound redness or pain, nausea, vomiting, or  worsening headache.  B. ACTIVITY LEVEL  1. Fatigue is common following brain surgery, rest if you are tired.  2. You should get up and walk around every hour during the daytime.  3. No bending, lifting or twisting for the first 3 weeks, but walking is  recommended.  4. Drink plenty of water, stay out of the sun.  You may be given a narcotic pain reliever such as Percocet  (oxycodone-acetaminophen). This is for short term use. Avoid use of alcohol when taking these meds.

## 2024-04-22 ENCOUNTER — EMERGENCY (EMERGENCY)
Facility: HOSPITAL | Age: 69
LOS: 1 days | Discharge: HOME | End: 2024-04-22
Attending: EMERGENCY MEDICINE
Payer: MEDICARE

## 2024-04-22 VITALS
TEMPERATURE: 98 F | RESPIRATION RATE: 14 BRPM | HEIGHT: 70 IN | DIASTOLIC BLOOD PRESSURE: 74 MMHG | HEART RATE: 72 BPM | OXYGEN SATURATION: 100 % | SYSTOLIC BLOOD PRESSURE: 96 MMHG | WEIGHT: 125 LBS

## 2024-04-22 LAB
ALBUMIN SERPL ELPH-MCNC: 3.6 G/DL — SIGNIFICANT CHANGE UP (ref 3.3–5.2)
ALP SERPL-CCNC: 96 U/L — SIGNIFICANT CHANGE UP (ref 40–120)
ALT FLD-CCNC: 7 U/L — SIGNIFICANT CHANGE UP
ANION GAP SERPL CALC-SCNC: 11 MMOL/L — SIGNIFICANT CHANGE UP (ref 5–17)
APTT BLD: 41.4 SEC — HIGH (ref 24.5–35.6)
AST SERPL-CCNC: 15 U/L — SIGNIFICANT CHANGE UP
BASOPHILS # BLD AUTO: 0.12 K/UL — SIGNIFICANT CHANGE UP (ref 0–0.2)
BASOPHILS NFR BLD AUTO: 1.2 % — SIGNIFICANT CHANGE UP (ref 0–2)
BILIRUB SERPL-MCNC: 0.4 MG/DL — SIGNIFICANT CHANGE UP (ref 0.4–2)
BLD GP AB SCN SERPL QL: SIGNIFICANT CHANGE UP
BUN SERPL-MCNC: 20.9 MG/DL — HIGH (ref 8–20)
CALCIUM SERPL-MCNC: 9 MG/DL — SIGNIFICANT CHANGE UP (ref 8.4–10.5)
CHLORIDE SERPL-SCNC: 98 MMOL/L — SIGNIFICANT CHANGE UP (ref 96–108)
CO2 SERPL-SCNC: 26 MMOL/L — SIGNIFICANT CHANGE UP (ref 22–29)
CREAT SERPL-MCNC: 1.32 MG/DL — HIGH (ref 0.5–1.3)
EGFR: 59 ML/MIN/1.73M2 — LOW
EOSINOPHIL # BLD AUTO: 0.18 K/UL — SIGNIFICANT CHANGE UP (ref 0–0.5)
EOSINOPHIL NFR BLD AUTO: 1.9 % — SIGNIFICANT CHANGE UP (ref 0–6)
GLUCOSE SERPL-MCNC: 84 MG/DL — SIGNIFICANT CHANGE UP (ref 70–99)
HCT VFR BLD CALC: 36.5 % — LOW (ref 39–50)
HGB BLD-MCNC: 12.2 G/DL — LOW (ref 13–17)
IMM GRANULOCYTES NFR BLD AUTO: 0.3 % — SIGNIFICANT CHANGE UP (ref 0–0.9)
INR BLD: 1.04 RATIO — SIGNIFICANT CHANGE UP (ref 0.85–1.18)
LYMPHOCYTES # BLD AUTO: 2.68 K/UL — SIGNIFICANT CHANGE UP (ref 1–3.3)
LYMPHOCYTES # BLD AUTO: 27.7 % — SIGNIFICANT CHANGE UP (ref 13–44)
MCHC RBC-ENTMCNC: 30.5 PG — SIGNIFICANT CHANGE UP (ref 27–34)
MCHC RBC-ENTMCNC: 33.4 GM/DL — SIGNIFICANT CHANGE UP (ref 32–36)
MCV RBC AUTO: 91.3 FL — SIGNIFICANT CHANGE UP (ref 80–100)
MONOCYTES # BLD AUTO: 1.01 K/UL — HIGH (ref 0–0.9)
MONOCYTES NFR BLD AUTO: 10.4 % — SIGNIFICANT CHANGE UP (ref 2–14)
NEUTROPHILS # BLD AUTO: 5.65 K/UL — SIGNIFICANT CHANGE UP (ref 1.8–7.4)
NEUTROPHILS NFR BLD AUTO: 58.5 % — SIGNIFICANT CHANGE UP (ref 43–77)
PLATELET # BLD AUTO: 361 K/UL — SIGNIFICANT CHANGE UP (ref 150–400)
POTASSIUM SERPL-MCNC: 4.4 MMOL/L — SIGNIFICANT CHANGE UP (ref 3.5–5.3)
POTASSIUM SERPL-SCNC: 4.4 MMOL/L — SIGNIFICANT CHANGE UP (ref 3.5–5.3)
PROT SERPL-MCNC: 6.3 G/DL — LOW (ref 6.6–8.7)
PROTHROM AB SERPL-ACNC: 11.5 SEC — SIGNIFICANT CHANGE UP (ref 9.5–13)
RBC # BLD: 4 M/UL — LOW (ref 4.2–5.8)
RBC # FLD: 13.6 % — SIGNIFICANT CHANGE UP (ref 10.3–14.5)
SODIUM SERPL-SCNC: 135 MMOL/L — SIGNIFICANT CHANGE UP (ref 135–145)
WBC # BLD: 9.67 K/UL — SIGNIFICANT CHANGE UP (ref 3.8–10.5)
WBC # FLD AUTO: 9.67 K/UL — SIGNIFICANT CHANGE UP (ref 3.8–10.5)

## 2024-04-22 PROCEDURE — 99285 EMERGENCY DEPT VISIT HI MDM: CPT

## 2024-04-22 RX ORDER — SODIUM CHLORIDE 9 MG/ML
1000 INJECTION INTRAMUSCULAR; INTRAVENOUS; SUBCUTANEOUS ONCE
Refills: 0 | Status: COMPLETED | OUTPATIENT
Start: 2024-04-22 | End: 2024-04-22

## 2024-04-22 RX ADMIN — SODIUM CHLORIDE 1000 MILLILITER(S): 9 INJECTION INTRAMUSCULAR; INTRAVENOUS; SUBCUTANEOUS at 23:25

## 2024-04-22 NOTE — ED ADULT NURSE NOTE - OBJECTIVE STATEMENT
pt to ED with c/o hematuria x 3 days. had gonzalez placed on the 15th after aneurysm sx. pt states he has been passing small clots in urine. denies pain. catheter has no issues draining as per pt. A+O x3. RR even and unlabored. denies any dizziness/headache/weakness. hematuria noted in leg bag.

## 2024-04-22 NOTE — ED PROVIDER NOTE - NSFOLLOWUPINSTRUCTIONS_ED_ALL_ED_FT
please take the antibiotic we sent to [facility] in [place] please follow-up with his urologist as soon as possible return to ED if cannot pee fevers or any other acute issues symptoms or concerns

## 2024-04-22 NOTE — ED PROVIDER NOTE - CARE PROVIDER_API CALL
Dariel Hummel  Urology  [address], [address]  [place] [address]  Phone: [phone]  Fax: [phone]  Follow Up Time:

## 2024-04-22 NOTE — ED ADULT NURSE NOTE - CHIEF COMPLAINT QUOTE
You can access the FollowMyHealth Patient Portal offered by [facility] by registering at the following website: [email or web address]. By joining [employer]’s FollowMyHealth portal, you will also be able to view your health information using other applications (apps) compatible with our system. pt reports blood in gonzalez x 3 days. denies blood thinner, fever, urinary sx

## 2024-04-22 NOTE — ED PROVIDER NOTE - PATIENT PORTAL LINK FT
You can access the FollowMyHealth Patient Portal offered by [facility] by registering at the following website: [email or web address]. By joining [employer]’s FollowMyHealth portal, you will also be able to view your health information using other applications (apps) compatible with our system.

## 2024-04-22 NOTE — ED ADULT NURSE NOTE - NURSING NEURO LEVEL OF CONSCIOUSNESS
"Pt presented to the ED today with N/V for past few days, no appetite, and feeling she had a panic attack while out with a friend. She is anxious an deporting a upset stomach during assessment, reports has COVID last year and that has also affected her appetite. She is not taking any medications other than supplements ( she stopped all her prescribed medications within the past month, due to side effects). She reports she drinks wine \"socially, but sometimes too much.\" Reports last drink was 4 days ago and denies any withdrawal symptoms, and reports never has them. She denies illicit drug use. She denies SI/HI, or hallucinations. She reports  she has a son who lives with her, whom is a drug addict, her grown children are all a mess and don't talk to one another, their Father passed, then the Grandma, then a step-brother dies of an overdose.\" She reports there has been a lot of deaths and things being a mess, she has a job interview coming up and this is giving her something to look forward to. She is anxious, some hand tremors noted when she filled her water cup. Skin is warm and dry, she is pleasant and cooperative, wanting to find help with her symptoms. Her belongings were placed in a locker, search was completed, tour and explanation of unit given. Resting in recliner with warm blanket, having ice chips at this time.   " alert and awake

## 2024-04-22 NOTE — ED PROVIDER NOTE - PROGRESS NOTE DETAILS
Patient currently comfortable no acute distress I had a long talk with patient about the importance of urgent follow-up with urologist he has a smoking history he needs to follow-up with urology for cystoscopy he has no rectal pain no fevers he is comfortable we will treat for hemorrhagic cystitis return to ED for urinary retention urinating blood clots fever inability tolerate p.o. fluids patient agrees to plan of care

## 2024-04-23 VITALS
HEART RATE: 58 BPM | DIASTOLIC BLOOD PRESSURE: 63 MMHG | OXYGEN SATURATION: 95 % | SYSTOLIC BLOOD PRESSURE: 122 MMHG | TEMPERATURE: 98 F | RESPIRATION RATE: 16 BRPM

## 2024-04-23 LAB
APPEARANCE UR: ABNORMAL
BACTERIA # UR AUTO: ABNORMAL /HPF
BILIRUB UR-MCNC: ABNORMAL
COLOR SPEC: ABNORMAL
DIFF PNL FLD: ABNORMAL
GLUCOSE UR QL: NEGATIVE MG/DL — SIGNIFICANT CHANGE UP
KETONES UR-MCNC: NEGATIVE MG/DL — SIGNIFICANT CHANGE UP
LEUKOCYTE ESTERASE UR-ACNC: ABNORMAL
NITRITE UR-MCNC: POSITIVE
PH UR: 5.5 — SIGNIFICANT CHANGE UP (ref 5–8)
PROT UR-MCNC: 100 MG/DL
RBC CASTS # UR COMP ASSIST: >1900 /HPF — HIGH (ref 0–4)
SP GR SPEC: 1.02 — SIGNIFICANT CHANGE UP (ref 1–1.03)
SQUAMOUS # UR AUTO: 0 /HPF — SIGNIFICANT CHANGE UP (ref 0–5)
UROBILINOGEN FLD QL: 0.2 MG/DL — SIGNIFICANT CHANGE UP (ref 0.2–1)
WBC UR QL: 71 /HPF — HIGH (ref 0–5)
YEAST-LIKE CELLS: PRESENT

## 2024-04-23 PROCEDURE — 87086 URINE CULTURE/COLONY COUNT: CPT

## 2024-04-23 PROCEDURE — 81001 URINALYSIS AUTO W/SCOPE: CPT

## 2024-04-23 PROCEDURE — 86900 BLOOD TYPING SEROLOGIC ABO: CPT

## 2024-04-23 PROCEDURE — 74177 CT ABD & PELVIS W/CONTRAST: CPT | Mod: 26,MC

## 2024-04-23 PROCEDURE — 85610 PROTHROMBIN TIME: CPT

## 2024-04-23 PROCEDURE — 99284 EMERGENCY DEPT VISIT MOD MDM: CPT

## 2024-04-23 PROCEDURE — 36415 COLL VENOUS BLD VENIPUNCTURE: CPT

## 2024-04-23 PROCEDURE — 85025 COMPLETE CBC W/AUTO DIFF WBC: CPT

## 2024-04-23 PROCEDURE — 80053 COMPREHEN METABOLIC PANEL: CPT

## 2024-04-23 PROCEDURE — 86850 RBC ANTIBODY SCREEN: CPT

## 2024-04-23 PROCEDURE — 51702 INSERT TEMP BLADDER CATH: CPT

## 2024-04-23 PROCEDURE — 74177 CT ABD & PELVIS W/CONTRAST: CPT | Mod: MC

## 2024-04-23 PROCEDURE — 86901 BLOOD TYPING SEROLOGIC RH(D): CPT

## 2024-04-23 PROCEDURE — 85730 THROMBOPLASTIN TIME PARTIAL: CPT

## 2024-04-23 RX ORDER — CEFPODOXIME PROXETIL 100 MG
200 TABLET ORAL ONCE
Refills: 0 | Status: COMPLETED | OUTPATIENT
Start: 2024-04-23 | End: 2024-04-23

## 2024-04-23 RX ORDER — CEFPODOXIME PROXETIL 100 MG
1 TABLET ORAL
Qty: 20 | Refills: 0
Start: 2024-04-23 | End: 2024-05-02

## 2024-04-23 RX ADMIN — Medication 200 MILLIGRAM(S): at 06:26

## 2024-04-23 NOTE — ED ADULT NURSE REASSESSMENT NOTE - NS ED NURSE REASSESS COMMENT FT1
pt resting in stretcher. remains A+O x3. RR even and unlabored. gonzalez draining yellow urine with blood streaks. denies c/o pain.

## 2024-04-24 LAB
CULTURE RESULTS: NO GROWTH — SIGNIFICANT CHANGE UP
SPECIMEN SOURCE: SIGNIFICANT CHANGE UP

## 2024-04-25 ENCOUNTER — APPOINTMENT (OUTPATIENT)
Dept: NEUROSURGERY | Facility: CLINIC | Age: 69
End: 2024-04-25
Payer: MEDICARE

## 2024-04-25 VITALS
WEIGHT: 130 LBS | HEART RATE: 65 BPM | HEIGHT: 70 IN | SYSTOLIC BLOOD PRESSURE: 109 MMHG | OXYGEN SATURATION: 98 % | BODY MASS INDEX: 18.61 KG/M2 | DIASTOLIC BLOOD PRESSURE: 66 MMHG | TEMPERATURE: 98.1 F

## 2024-04-25 DIAGNOSIS — I67.1 CEREBRAL ANEURYSM, NONRUPTURED: ICD-10-CM

## 2024-04-25 PROCEDURE — 99024 POSTOP FOLLOW-UP VISIT: CPT

## 2024-04-25 NOTE — REASON FOR VISIT
[Formal Caregiver] : formal caregiver [de-identified] : R pericallosal aneurysm clipping  [de-identified] : 4/12/24 [de-identified] : 13

## 2024-04-25 NOTE — PHYSICAL EXAM
[FreeTextEntry1] : Constitutional: NAD Neuro * Mental Status:  GCS 15: Awake, alert, oriented to conversation. No aphasia or difficulty speaking. No dysarthria. * Cranial Nerves: Cnii-Cnxii grossly intact. EOMI, tongue midline, no gaze deviation, no facial droop, shoulder shrug intact * Motor: b/l UE and LE intact * Sensory: Sensation intact to light touch * Reflexes: not assessed Cardiovascular: Regular rate and rhythm. Eyes: See neurologic examination with detailed examination of eyes. Respiratory: non labored breathing Musculoskeletal: No muscle wasting noted Skin: grossly intact, incision C/D/I, drain staple removed

## 2024-04-25 NOTE — HISTORY OF PRESENT ILLNESS
[FreeTextEntry1] : Mr. Stephenson is a 68 year old RIGHT HANDED MALE who presents s/p cerebral angiogram for follow up. Patient had incidental cerebral aneurysms recently discovered on imaging by neurology due to hand tremors. 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 No significant stenosis or embolic occlusion. Patient underwent diagnostic cerebral angiogram which confirmed R A2 aneurysm 2.4mm and L cavernous ICA aneurysm 5x6 mm. Patient reports smoking 1 1/2 pack a day, history or HTN with no reported family history of cerebral aneurysms. No reported allergies and patient denies taking any blood thinners.  He underwent R pericallosal aneurysm clipping on 4/12/24, intraop angiogram showed no residual aneurysm. Patient was discharged to home with no needs on 4/15/24. He reports that he feels well, has no acute complaints. at this time. Denies headache, weakness, numbness, tingling, difficulty walking, difficulty speaking, dizziness.   Neurologist: Mandeep Beltrán

## 2024-04-25 NOTE — END OF VISIT
[FreeTextEntry3] : Mr. Stephenson is doing well after his surgery.  He offers no complaints.  His incision is well-healed.  I will see him again in 3 months.

## 2024-04-25 NOTE — ASSESSMENT
[FreeTextEntry1] : 68M with PMH COPD, cerebral aneurysm who presents s/p clipping of R pericallosal aneurysm on 4/12/24, now POD 13. Drain staple removed, incision C/D/I.  Plan: - Follow up in 3 months (July 2024) - Repeat CTA in 1 year (April 2025) - Smoking cessation education provided - Patient in agreement with plan

## 2024-04-28 DIAGNOSIS — R31.9 HEMATURIA, UNSPECIFIED: ICD-10-CM

## 2024-04-28 DIAGNOSIS — J44.9 CHRONIC OBSTRUCTIVE PULMONARY DISEASE, UNSPECIFIED: ICD-10-CM

## 2024-04-28 DIAGNOSIS — I10 ESSENTIAL (PRIMARY) HYPERTENSION: ICD-10-CM

## 2024-04-28 DIAGNOSIS — N40.0 BENIGN PROSTATIC HYPERPLASIA WITHOUT LOWER URINARY TRACT SYMPTOMS: ICD-10-CM

## 2024-04-28 DIAGNOSIS — N30.01 ACUTE CYSTITIS WITH HEMATURIA: ICD-10-CM

## 2024-04-28 DIAGNOSIS — Z86.79 PERSONAL HISTORY OF OTHER DISEASES OF THE CIRCULATORY SYSTEM: ICD-10-CM

## 2024-05-22 ENCOUNTER — APPOINTMENT (OUTPATIENT)
Dept: UROLOGY | Facility: CLINIC | Age: 69
End: 2024-05-22
Payer: MEDICARE

## 2024-05-22 VITALS
WEIGHT: 130 LBS | BODY MASS INDEX: 18.61 KG/M2 | SYSTOLIC BLOOD PRESSURE: 147 MMHG | DIASTOLIC BLOOD PRESSURE: 68 MMHG | HEIGHT: 70 IN

## 2024-05-22 DIAGNOSIS — Z12.5 ENCOUNTER FOR SCREENING FOR MALIGNANT NEOPLASM OF PROSTATE: ICD-10-CM

## 2024-05-22 PROCEDURE — 99204 OFFICE O/P NEW MOD 45 MIN: CPT

## 2024-05-22 NOTE — ASSESSMENT
[FreeTextEntry1] :  plan: - TOV next week - continue with tamsulosin - renal ultrasound - PSA - will consider cystoscopy after

## 2024-05-22 NOTE — HISTORY OF PRESENT ILLNESS
[FreeTextEntry1] : 70 yo M for initial consultation active smoker no FH of prostate cancer not sure regarding PSA testing  knows he had LUTS and had some procedure for the prostate in the past known he was doing well since last follow up with urology before 2019  recently had neurosurgery for brain aneurysm  after had retention and gonzalez inserted, since with gonzalez was discharged with tamsulosin 0.4 mg since  plan: - TOV next week - continue with tamsulosin - renal ultrasound - PSA - will consider cystoscopy after

## 2024-05-23 ENCOUNTER — APPOINTMENT (OUTPATIENT)
Dept: ULTRASOUND IMAGING | Facility: CLINIC | Age: 69
End: 2024-05-23
Payer: MEDICARE

## 2024-05-23 ENCOUNTER — OUTPATIENT (OUTPATIENT)
Dept: OUTPATIENT SERVICES | Facility: HOSPITAL | Age: 69
LOS: 1 days | End: 2024-05-23
Payer: MEDICARE

## 2024-05-23 DIAGNOSIS — Z12.5 ENCOUNTER FOR SCREENING FOR MALIGNANT NEOPLASM OF PROSTATE: ICD-10-CM

## 2024-05-23 PROCEDURE — 76770 US EXAM ABDO BACK WALL COMP: CPT | Mod: 26

## 2024-05-23 PROCEDURE — 76770 US EXAM ABDO BACK WALL COMP: CPT

## 2024-05-31 ENCOUNTER — APPOINTMENT (OUTPATIENT)
Dept: UROLOGY | Facility: CLINIC | Age: 69
End: 2024-05-31
Payer: MEDICARE

## 2024-05-31 ENCOUNTER — APPOINTMENT (OUTPATIENT)
Dept: UROLOGY | Facility: CLINIC | Age: 69
End: 2024-05-31

## 2024-05-31 VITALS
HEIGHT: 70 IN | OXYGEN SATURATION: 98 % | WEIGHT: 130 LBS | RESPIRATION RATE: 15 BRPM | BODY MASS INDEX: 18.61 KG/M2 | HEART RATE: 66 BPM | DIASTOLIC BLOOD PRESSURE: 70 MMHG | SYSTOLIC BLOOD PRESSURE: 142 MMHG

## 2024-05-31 PROCEDURE — 51700 IRRIGATION OF BLADDER: CPT

## 2024-05-31 PROCEDURE — 99213 OFFICE O/P EST LOW 20 MIN: CPT | Mod: 25

## 2024-05-31 PROCEDURE — A4216: CPT | Mod: NC

## 2024-05-31 NOTE — HISTORY OF PRESENT ILLNESS
[FreeTextEntry1] :  had TOV today - could not void patient not interested in gonzalez now understands he may need to go to ER later if he is unable to void instructed to go to the ER if it has been more than 4-5 hours with no voiding, even if no sensation  reviewed ultrasound: normal kidneys reviewed PSA 5/28/2024 - 1.87  plan: - ER if unable to void - RPA next week with PVR

## 2024-06-19 ENCOUNTER — APPOINTMENT (OUTPATIENT)
Dept: UROLOGY | Facility: CLINIC | Age: 69
End: 2024-06-19

## 2024-06-19 ENCOUNTER — APPOINTMENT (OUTPATIENT)
Dept: UROLOGY | Facility: CLINIC | Age: 69
End: 2024-06-19
Payer: MEDICARE

## 2024-06-19 DIAGNOSIS — R33.9 RETENTION OF URINE, UNSPECIFIED: ICD-10-CM

## 2024-06-19 PROCEDURE — 99214 OFFICE O/P EST MOD 30 MIN: CPT

## 2024-06-19 RX ORDER — TAMSULOSIN HYDROCHLORIDE 0.4 MG/1
0.4 CAPSULE ORAL
Qty: 90 | Refills: 3 | Status: ACTIVE | COMMUNITY
Start: 2024-06-19

## 2024-06-19 NOTE — HISTORY OF PRESENT ILLNESS
[FreeTextEntry1] :  Left office 5/31/24 after failed TOV, refused gonzalez  insertion into office  Within 3 hours, pt had acute retention, went to [facility] for gonzalez insertion 5/31/24 tamsulosin 0.4 mg prescribed by neurosurgeon   never had cysto discussed with patient will need cystoscopy discussed the procedure for cystoscopy, discussed possible complications including hematuria, infection, and retention  plan: - office cystoscopy - may need UDS in the future

## 2024-06-25 NOTE — PATIENT PROFILE ADULT - NSPROPTRIGHTSUPPORTPERSON_GEN_A_NUR
Dear Solitario Romero,    You were admitted to [facility] at a skilled nursing facility. You will need to begin/continue the medications listed below to help prevent you from coming back into the hospital.    Medication changes:  See after visit summary    Appointments/follow-up:  [facility] nursing facility care    It was a pleasure taking care of you,  Your  Care Team    declines

## 2024-06-26 ENCOUNTER — APPOINTMENT (OUTPATIENT)
Dept: UROLOGY | Facility: CLINIC | Age: 69
End: 2024-06-26
Payer: MEDICARE

## 2024-06-26 DIAGNOSIS — N40.1 BENIGN PROSTATIC HYPERPLASIA WITH LOWER URINARY TRACT SYMPMS: ICD-10-CM

## 2024-06-26 DIAGNOSIS — R35.1 BENIGN PROSTATIC HYPERPLASIA WITH LOWER URINARY TRACT SYMPMS: ICD-10-CM

## 2024-06-26 PROCEDURE — 52000 CYSTOURETHROSCOPY: CPT

## 2024-06-26 PROCEDURE — 99214 OFFICE O/P EST MOD 30 MIN: CPT | Mod: 25

## 2024-07-24 ENCOUNTER — NON-APPOINTMENT (OUTPATIENT)
Age: 69
End: 2024-07-24

## 2024-07-25 ENCOUNTER — APPOINTMENT (OUTPATIENT)
Dept: NEUROSURGERY | Facility: CLINIC | Age: 69
End: 2024-07-25
Payer: MEDICARE

## 2024-07-25 VITALS
HEIGHT: 70 IN | DIASTOLIC BLOOD PRESSURE: 72 MMHG | WEIGHT: 130 LBS | HEART RATE: 58 BPM | BODY MASS INDEX: 18.61 KG/M2 | OXYGEN SATURATION: 97 % | SYSTOLIC BLOOD PRESSURE: 108 MMHG | TEMPERATURE: 98.8 F

## 2024-07-25 DIAGNOSIS — I67.1 CEREBRAL ANEURYSM, NONRUPTURED: ICD-10-CM

## 2024-07-25 PROCEDURE — 99214 OFFICE O/P EST MOD 30 MIN: CPT

## 2024-07-25 NOTE — ASSESSMENT
[FreeTextEntry1] : 68M with PMH COPD, cerebral aneurysm who presents s/p clipping of R pericallosal aneurysm on 4/12/24. He presents today for 3-month follow up.   Plan: - Repeat CTA in 9 months (April 2025) - Follow up after imaging performed  - Smoking cessation education provided - Cleared for prostate procedure from neurosurgical perspective  - Patient in agreement with plan.

## 2024-07-25 NOTE — HISTORY OF PRESENT ILLNESS
[FreeTextEntry1] : Mr. Stephenson is a 68 year old R-hand dominant male, current smoker, who presents for follow up of R pericallosal aneurysm clipping 4/12/24. He had incidental cerebral aneurysms recently discovered on imaging by neurology due to hand tremors on MRA 2/2023 which showed laterally directed aneurysms arising from the bilateral proximal cavernous segment ICAs measuring up to 5 mm on the left and 3 mm on the right. Patient underwent diagnostic cerebral angiogram which confirmed R A2 aneurysm 2.4mm and L cavernous ICA aneurysm 5x6 mm.  He underwent R pericallosal aneurysm clipping on 4/12/24, intraop angiogram showed no residual aneurysm. Patient was discharged to home with no needs on 4/15/24. He presents today for 3-month follow up s/p clipping. He reports that he feels well, admits to intermittent mild incisional itching but otherwise denies headache, weakness, numbness, tingling, difficulty walking, difficulty speaking, dizziness. He reports that he has a prostate procedure scheduled for August.

## 2024-07-25 NOTE — PHYSICAL EXAM
[FreeTextEntry1] : Constitutional: NAD Neuro * Mental Status:  GCS 15: Awake, alert, oriented to conversation. No aphasia or difficulty speaking. No dysarthria. * Cranial Nerves: Cnii-Cnxii grossly intact. EOMI, tongue midline, no gaze deviation, no facial droop * Motor: b/l UE and LE intact * Sensory: Sensation intact to light touch * Reflexes: not assessed Cardiovascular: Regular rate and rhythm. Eyes: See neurologic examination with detailed examination of eyes. Respiratory: non labored breathing Musculoskeletal: No muscle wasting noted Skin: incision C/D/I

## 2024-07-31 ENCOUNTER — APPOINTMENT (OUTPATIENT)
Dept: UROLOGY | Facility: CLINIC | Age: 69
End: 2024-07-31

## 2024-07-31 ENCOUNTER — APPOINTMENT (OUTPATIENT)
Dept: UROLOGY | Facility: CLINIC | Age: 69
End: 2024-07-31
Payer: MEDICARE

## 2024-07-31 DIAGNOSIS — R33.9 RETENTION OF URINE, UNSPECIFIED: ICD-10-CM

## 2024-07-31 PROCEDURE — 51702 INSERT TEMP BLADDER CATH: CPT

## 2024-08-21 ENCOUNTER — NON-APPOINTMENT (OUTPATIENT)
Age: 69
End: 2024-08-21

## 2024-08-30 ENCOUNTER — NON-APPOINTMENT (OUTPATIENT)
Age: 69
End: 2024-08-30

## 2024-09-04 ENCOUNTER — NON-APPOINTMENT (OUTPATIENT)
Age: 69
End: 2024-09-04

## 2024-09-11 ENCOUNTER — APPOINTMENT (OUTPATIENT)
Dept: UROLOGY | Facility: CLINIC | Age: 69
End: 2024-09-11
Payer: MEDICARE

## 2024-09-11 DIAGNOSIS — R33.9 RETENTION OF URINE, UNSPECIFIED: ICD-10-CM

## 2024-09-11 PROCEDURE — 51702 INSERT TEMP BLADDER CATH: CPT

## 2024-09-25 ENCOUNTER — OUTPATIENT (OUTPATIENT)
Dept: OUTPATIENT SERVICES | Facility: HOSPITAL | Age: 69
LOS: 1 days | End: 2024-09-25
Payer: MEDICARE

## 2024-09-25 VITALS
WEIGHT: 128.09 LBS | RESPIRATION RATE: 16 BRPM | TEMPERATURE: 98 F | HEIGHT: 70 IN | SYSTOLIC BLOOD PRESSURE: 92 MMHG | OXYGEN SATURATION: 98 % | DIASTOLIC BLOOD PRESSURE: 60 MMHG | HEART RATE: 53 BPM

## 2024-09-25 DIAGNOSIS — Z13.89 ENCOUNTER FOR SCREENING FOR OTHER DISORDER: ICD-10-CM

## 2024-09-25 DIAGNOSIS — Z79.899 OTHER LONG TERM (CURRENT) DRUG THERAPY: ICD-10-CM

## 2024-09-25 DIAGNOSIS — Z98.890 OTHER SPECIFIED POSTPROCEDURAL STATES: Chronic | ICD-10-CM

## 2024-09-25 DIAGNOSIS — R33.9 RETENTION OF URINE, UNSPECIFIED: ICD-10-CM

## 2024-09-25 DIAGNOSIS — N40.1 BENIGN PROSTATIC HYPERPLASIA WITH LOWER URINARY TRACT SYMPTOMS: ICD-10-CM

## 2024-09-25 DIAGNOSIS — I10 ESSENTIAL (PRIMARY) HYPERTENSION: ICD-10-CM

## 2024-09-25 DIAGNOSIS — I34.0 NONRHEUMATIC MITRAL (VALVE) INSUFFICIENCY: ICD-10-CM

## 2024-09-25 DIAGNOSIS — Z01.818 ENCOUNTER FOR OTHER PREPROCEDURAL EXAMINATION: ICD-10-CM

## 2024-09-25 DIAGNOSIS — Z29.9 ENCOUNTER FOR PROPHYLACTIC MEASURES, UNSPECIFIED: ICD-10-CM

## 2024-09-25 LAB
A1C WITH ESTIMATED AVERAGE GLUCOSE RESULT: 5.7 % — HIGH (ref 4–5.6)
ANION GAP SERPL CALC-SCNC: 11 MMOL/L — SIGNIFICANT CHANGE UP (ref 5–17)
APPEARANCE UR: ABNORMAL
BACTERIA # UR AUTO: ABNORMAL /HPF
BASOPHILS # BLD AUTO: 0.13 K/UL — SIGNIFICANT CHANGE UP (ref 0–0.2)
BASOPHILS NFR BLD AUTO: 1.2 % — SIGNIFICANT CHANGE UP (ref 0–2)
BILIRUB UR-MCNC: NEGATIVE — SIGNIFICANT CHANGE UP
BLD GP AB SCN SERPL QL: SIGNIFICANT CHANGE UP
BUN SERPL-MCNC: 11.5 MG/DL — SIGNIFICANT CHANGE UP (ref 8–20)
CALCIUM SERPL-MCNC: 9.7 MG/DL — SIGNIFICANT CHANGE UP (ref 8.4–10.5)
CAST: 10 /LPF — HIGH (ref 0–4)
CHLORIDE SERPL-SCNC: 103 MMOL/L — SIGNIFICANT CHANGE UP (ref 96–108)
CO2 SERPL-SCNC: 29 MMOL/L — SIGNIFICANT CHANGE UP (ref 22–29)
COLOR SPEC: SIGNIFICANT CHANGE UP
CREAT SERPL-MCNC: 1.37 MG/DL — HIGH (ref 0.5–1.3)
DIFF PNL FLD: ABNORMAL
EGFR: 56 ML/MIN/1.73M2 — LOW
EOSINOPHIL # BLD AUTO: 0.35 K/UL — SIGNIFICANT CHANGE UP (ref 0–0.5)
EOSINOPHIL NFR BLD AUTO: 3.3 % — SIGNIFICANT CHANGE UP (ref 0–6)
ESTIMATED AVERAGE GLUCOSE: 117 MG/DL — HIGH (ref 68–114)
GLUCOSE SERPL-MCNC: 78 MG/DL — SIGNIFICANT CHANGE UP (ref 70–99)
GLUCOSE UR QL: NEGATIVE MG/DL — SIGNIFICANT CHANGE UP
HCT VFR BLD CALC: 46.5 % — SIGNIFICANT CHANGE UP (ref 39–50)
HGB BLD-MCNC: 14.9 G/DL — SIGNIFICANT CHANGE UP (ref 13–17)
IMM GRANULOCYTES NFR BLD AUTO: 0.5 % — SIGNIFICANT CHANGE UP (ref 0–0.9)
KETONES UR-MCNC: ABNORMAL MG/DL
LEUKOCYTE ESTERASE UR-ACNC: ABNORMAL
LYMPHOCYTES # BLD AUTO: 29.2 % — SIGNIFICANT CHANGE UP (ref 13–44)
LYMPHOCYTES # BLD AUTO: 3.12 K/UL — SIGNIFICANT CHANGE UP (ref 1–3.3)
MCHC RBC-ENTMCNC: 29.6 PG — SIGNIFICANT CHANGE UP (ref 27–34)
MCHC RBC-ENTMCNC: 32 GM/DL — SIGNIFICANT CHANGE UP (ref 32–36)
MCV RBC AUTO: 92.4 FL — SIGNIFICANT CHANGE UP (ref 80–100)
MONOCYTES # BLD AUTO: 1 K/UL — HIGH (ref 0–0.9)
MONOCYTES NFR BLD AUTO: 9.4 % — SIGNIFICANT CHANGE UP (ref 2–14)
NEUTROPHILS # BLD AUTO: 6.03 K/UL — SIGNIFICANT CHANGE UP (ref 1.8–7.4)
NEUTROPHILS NFR BLD AUTO: 56.4 % — SIGNIFICANT CHANGE UP (ref 43–77)
NITRITE UR-MCNC: POSITIVE
PH UR: 7 — SIGNIFICANT CHANGE UP (ref 5–8)
PLATELET # BLD AUTO: 371 K/UL — SIGNIFICANT CHANGE UP (ref 150–400)
POTASSIUM SERPL-MCNC: 5.2 MMOL/L — SIGNIFICANT CHANGE UP (ref 3.5–5.3)
POTASSIUM SERPL-SCNC: 5.2 MMOL/L — SIGNIFICANT CHANGE UP (ref 3.5–5.3)
PROT UR-MCNC: 100 MG/DL
RBC # BLD: 5.03 M/UL — SIGNIFICANT CHANGE UP (ref 4.2–5.8)
RBC # FLD: 14.6 % — HIGH (ref 10.3–14.5)
RBC CASTS # UR COMP ASSIST: 38 /HPF — HIGH (ref 0–4)
SODIUM SERPL-SCNC: 143 MMOL/L — SIGNIFICANT CHANGE UP (ref 135–145)
SP GR SPEC: 1.02 — SIGNIFICANT CHANGE UP (ref 1–1.03)
SQUAMOUS # UR AUTO: 1 /HPF — SIGNIFICANT CHANGE UP (ref 0–5)
UROBILINOGEN FLD QL: 1 MG/DL — SIGNIFICANT CHANGE UP (ref 0.2–1)
WBC # BLD: 10.68 K/UL — HIGH (ref 3.8–10.5)
WBC # FLD AUTO: 10.68 K/UL — HIGH (ref 3.8–10.5)
WBC UR QL: 734 /HPF — HIGH (ref 0–5)

## 2024-09-25 PROCEDURE — 85025 COMPLETE CBC W/AUTO DIFF WBC: CPT

## 2024-09-25 PROCEDURE — 71046 X-RAY EXAM CHEST 2 VIEWS: CPT

## 2024-09-25 PROCEDURE — 93010 ELECTROCARDIOGRAM REPORT: CPT

## 2024-09-25 PROCEDURE — 71046 X-RAY EXAM CHEST 2 VIEWS: CPT | Mod: 26

## 2024-09-25 PROCEDURE — 86901 BLOOD TYPING SEROLOGIC RH(D): CPT

## 2024-09-25 PROCEDURE — 87077 CULTURE AEROBIC IDENTIFY: CPT

## 2024-09-25 PROCEDURE — 36415 COLL VENOUS BLD VENIPUNCTURE: CPT

## 2024-09-25 PROCEDURE — G0463: CPT

## 2024-09-25 PROCEDURE — 87086 URINE CULTURE/COLONY COUNT: CPT

## 2024-09-25 PROCEDURE — 93005 ELECTROCARDIOGRAM TRACING: CPT

## 2024-09-25 PROCEDURE — 81001 URINALYSIS AUTO W/SCOPE: CPT

## 2024-09-25 PROCEDURE — 86900 BLOOD TYPING SEROLOGIC ABO: CPT

## 2024-09-25 PROCEDURE — 83036 HEMOGLOBIN GLYCOSYLATED A1C: CPT

## 2024-09-25 PROCEDURE — 87186 SC STD MICRODIL/AGAR DIL: CPT

## 2024-09-25 PROCEDURE — 86850 RBC ANTIBODY SCREEN: CPT

## 2024-09-25 PROCEDURE — 80048 BASIC METABOLIC PNL TOTAL CA: CPT

## 2024-09-25 NOTE — H&P PST ADULT - HISTORY OF PRESENT ILLNESS
70 yo M for initial consultation  active smoker  no FH of prostate cancer  not sure regarding PSA testing  ?  knows he had LUTS and had some procedure for the prostate in the past  known he was doing well since  last follow up with urology before 2019  ?  recently had neurosurgery for brain aneurysm  after had retention and gonzalez inserted, since with gonzalez  was discharged with tamsulosin 0.4 mg since  ?cystoscopy today: enlarged obstructing prostate with large median lobe  gonzalez inserted at the end  ?  discussed LEP  Discussed Laser enucleation of prostate with morcellation (HOLEP/THuLEP). Procedure, in hospital stay and recovery explained.  BPH associated with nocturia, Urinary retention  Urology Surgery Booking Form Inpatient  .  Status: Need Information - Required  information,Financial Authorization  Requested for: [id number]  Priority Category : SEMI-URGENT  Length of Procedure : 3.5 hours  Did the patient receive COVID 19 vaccine? : No  ERP (Enhanced Recovery Program) : No  Flouro : N  See Attached Form : arora tray, morcellator, laser soltive  Special Equipment : Y  PST : Required  Medical Clearance : Medical & Cardiac  Admission Type : OP  Anesthesia Alert : N  Anesthesia Type : General  Procedure Description: : laser enucleation of the prostate  Laterality : N/A  CPT Code(s): : 97840-[id number], Laser Enucleation of the Prostate with/without      Morcellation  Suitable for Amb Surg? : No  Performing Facility : [facility]    plan:  - TOV next week  - continue with tamsulosin  - renal ultrasound  - PSA  - will consider cystoscopy after   70 yo M with PMH of cerebral aneurysm s/p coil 4/12/2024, HTN(Not on any medication), bipolar disorder, depression, anxiety, COPD, cervical radiculopathy presents to PST today. Pt. reports hx of urinary retention 4/2024 and went to [facility] . Currently with urinary catheter draining yellow urine.     for initial consultation  active smoker  no FH of prostate cancer  not sure regarding PSA testing  ?  knows he had LUTS and had some procedure for the prostate in the past  known he was doing well since  last follow up with urology before 2019  ?  recently had neurosurgery for brain aneurysm  after had retention and gonzalez inserted, since with gonzalez  was discharged with tamsulosin 0.4 mg since  ?cystoscopy today: enlarged obstructing prostate with large median lobe  gonzalez inserted at the end  ?  discussed LEP  Discussed Laser enucleation of prostate with morcellation (HOLEP/THuLEP). Procedure, in hospital stay and recovery explained.  BPH associated with nocturia, Urinary retention  Urology Surgery Booking Form Inpatient  .  Status: Need Information - Required  information,Financial Authorization  Requested for: [id number]  Priority Category : SEMI-URGENT  Length of Procedure : 3.5 hours  Did the patient receive COVID 19 vaccine? : No  ERP (Enhanced Recovery Program) : No  Flouro : N  See Attached Form : arora tray, morcellator, laser soltive  Special Equipment : Y  PST : Required  Medical Clearance : Medical & Cardiac  Admission Type : OP  Anesthesia Alert : N  Anesthesia Type : General  Procedure Description: : laser enucleation of the prostate  Laterality : N/A  CPT Code(s): : 26967-[id number], Laser Enucleation of the Prostate with/without      Morcellation  Suitable for Amb Surg? : No  Performing Facility : [facility]    plan:  - TOV next week  - continue with tamsulosin  - renal ultrasound  - PSA  - will consider cystoscopy after      Pt is  a 68 year old male, Pt right hand dominate, seen today pre-op for Right Craniotomy interhemispheric approach for cerebral aneurisms. Pt reports incidental cerebral aneurysms findings recently discovered on imaging by neurology due to hand tremors. 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 Pt medical hx includes current every day smoker 1 1/2 pack a day,  HTN(Not on any medication), bipolar disorder, depression, anxiety, COPD, cervical radiculopathy.  Pt denies headaches, denies change in vision, denies fever/chills and s/s of symptoms and any other related issues. Pt scheduled for this surgery on 4/12/24 with Dr. Samuel    68 yo M with PMH of cerebral aneurysm s/p coil 4/12/2024, BPH, HTN, bipolar disorder, mitral valve regurgitation, depression, anxiety, COPD, cervical radiculopathy presents to PST today. Pt is a pour historian  Pt. reports hx of urinary retention 4/2024 and went to [facility] . Currently with urinary catheter draining yellow urine. Cystoscopy performed by Dr. Tejeda- enlarged obstructing prostate. Scheduled for laser enucleation of the prostate on 10/8/2024.   ?

## 2024-09-25 NOTE — H&P PST ADULT - PROBLEM SELECTOR PLAN 4
Colini - 6 high risk   Surgical team please assess/recommend mechanical/pharmacological measures for VTE prophylaxis

## 2024-09-25 NOTE — H&P PST ADULT - PROBLEM SELECTOR PLAN 2
Right Craniotomy interhemispheric approach Managed by PCP/cardiologist  Continue prescribed medication.

## 2024-09-25 NOTE — H&P PST ADULT - PROBLEM SELECTOR PLAN 1
Moderate risk surgical team to determine prophylactic intervention 70 yo M with PMH of cerebral aneurysm s/p coil 4/12/2024, BPH, mitral valve regurgitation HTN, bipolar disorder, depression, anxiety, COPD, cervical radiculopathy now with enlarged prostate now scheduled for laser enucleation of the prostate on 10/8/2024.   ?  -Medical evaluation pending  -Cardiac evaluation pending  - Neurology evaluation pending   - Patient educated on ERP protocol (written/verbal)- verbalized understanding  -Educated on NSAIDS, multivitamins and herbals that increase the risk of bleeding and need to be stopped 5 days before procedure  -Educated on infection prevention  -Tylenol can be taken if needed for pain  -Will continue all other medications as prescribed  -Verbalized understanding of all instructions.

## 2024-09-25 NOTE — H&P PST ADULT - NSICDXPASTMEDICALHX_GEN_ALL_CORE_FT
PAST MEDICAL HISTORY:  Bipolar disorder     BPH (benign prostatic hyperplasia)     Cervical radiculopathy     COPD, severe     Depression     Emphysema/COPD     History of cerebral aneurysm     Tremors of nervous system     Urinary retention gonzalez catheter 2/12/1inserted

## 2024-09-25 NOTE — H&P PST ADULT - ENT GEN HX ROS MEA POS PC
Spoke with patient.    For some reason has been waiting at pharmacy all day.    Needs, out of medication.    Takes for school    Takes 10mg only on weekends.  Takes 20mg 24 hour Monday thru Friday.    Pharmacy verified.    Encouraged to also make a med check as it has been over 6 months. He said he will call back today or tomorrow to schedule that with Dr Rodriguez.    meds set up for review.    He would like call back if we send it in/once it is sent in.  ([phone])  Thank you   hearing difficulty

## 2024-09-25 NOTE — H&P PST ADULT - LYMPHATIC
No lymphadedenopathy posterior cervical L/posterior cervical R/anterior cervical L/anterior cervical R

## 2024-09-25 NOTE — H&P PST ADULT - MUSCULOSKELETAL
normal/ROM intact/normal gait/strength 5/5 bilateral upper extremities/strength 5/5 bilateral lower extremities details… ROM intact/no joint swelling/normal gait/strength 5/5 bilateral upper extremities/strength 5/5 bilateral lower extremities

## 2024-09-25 NOTE — H&P PST ADULT - IN ACCORDANCE WITH [place] STATE LAW, WE OFFER EVERY PATIENT A HEPATITIS C TEST. WOULD YOU LIKE TO BE TESTED TODAY?
Tetracycline Counseling: Patient counseled regarding possible photosensitivity and increased risk for sunburn.  Patient instructed to avoid sunlight, if possible.  When exposed to sunlight, patients should wear protective clothing, sunglasses, and sunscreen.  The patient was instructed to call the office immediately if the following severe adverse effects occur:  hearing changes, easy bruising/bleeding, severe headache, or vision changes.  The patient verbalized understanding of the proper use and possible adverse effects of tetracycline.  All of the patient's questions and concerns were addressed. Patient understands to avoid pregnancy while on therapy due to potential birth defects. Opt out

## 2024-09-25 NOTE — H&P PST ADULT - ASSESSMENT
Pt is  a 68 year old male, Pt right hand dominate, seen today pre-op for Right Craniotomy interhemispheric approach for cerebral aneurisms. Pt reports incidental cerebral aneurysms findings recently discovered on imaging by neurology due to hand tremors. 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 Pt medical hx includes current every day smoker 1 1/2 pack a day,  HTN(Not on any medication), bipolar disorder, depression, anxiety, COPD, cervical radiculopathy. Pt denies headaches, denies change in vision, denies fever/chills and s/s of symptoms and any other related issues. Pt scheduled for this surgery on 24 with Dr. Samuel. Surgery protocol reviewed with Pt today. Pt to follow-up with PCP fro medical evaluation and clearance  CAPRINI VTE 2.0 SCORE [CLOT updated 2019]    AGE RELATED RISK FACTORS                                                       MOBILITY RELATED FACTORS  [ ] Age 41-60 years                                            (1 Point)                    [ ] Bed rest                                                        (1 Point)  [ x] Age: 61-74 years                                           (2 Points)                  [ ] Plaster cast                                                   (2 Points)  [ ] Age= 75 years                                              (3 Points)                    [ ] Bed bound for more than 72 hours                 (2 Points)    DISEASE RELATED RISK FACTORS                                               GENDER SPECIFIC FACTORS  [ ] Edema in the lower extremities                       (1 Point)              [ ] Pregnancy                                                     (1 Point)  [ ] Varicose veins                                               (1 Point)                     [ ] Post-partum < 6 weeks                                   (1 Point)             [x ] BMI > 25 Kg/m2                                            (1 Point)                     [ ] Hormonal therapy  or oral contraception          (1 Point)                 [ ] Sepsis (in the previous month)                        (1 Point)               [ ] History of pregnancy complications                 (1 point)  [ ] Pneumonia or serious lung disease                                               [ ] Unexplained or recurrent                     (1 Point)           (in the previous month)                               (1 Point)  [ ] Abnormal pulmonary function test                     (1 Point)                 SURGERY RELATED RISK FACTORS  [ ] Acute myocardial infarction                              (1 Point)               [ ]  Section                                             (1 Point)  [ ] Congestive heart failure (in the previous month)  (1 Point)      [ ] Minor surgery                                                  (1 Point)   [ ] Inflammatory bowel disease                             (1 Point)               [ ] Arthroscopic surgery                                        (2 Points)  [ ] Central venous access                                      (2 Points)                [x ] General surgery lasting more than 45 minutes (2 points)  [ ] Malignancy- Present or previous                   (2 Points)                [ ] Elective arthroplasty                                         (5 points)    [ ] Stroke (in the previous month)                          (5 Points)                                                                                                                                                           HEMATOLOGY RELATED FACTORS                                                 TRAUMA RELATED RISK FACTORS  [ ] Prior episodes of VTE                                     (3 Points)                [ ] Fracture of the hip, pelvis, or leg                       (5 Points)  [ ] Positive family history for VTE                         (3 Points)             [ ] Acute spinal cord injury (in the previous month)  (5 Points)  [ ] Prothrombin 53326 A                                     (3 Points)               [ ] Paralysis  (less than 1 month)                             (5 Points)  [ ] Factor V Leiden                                             (3 Points)                  [ ] Multiple Trauma within 1 month                        (5 Points)  [ ] Lupus anticoagulants                                     (3 Points)                                                           [ ] Anticardiolipin antibodies                               (3 Points)                                                       [ ] High homocysteine in the blood                      (3 Points)                                             [ ] Other congenital or acquired thrombophilia      (3 Points)                                                [ ] Heparin induced thrombocytopenia                  (3 Points)                                     Total Score [   4      ]   OPIOID RISK TOOL    SHAY EACH BOX THAT APPLIES AND ADD TOTALS AT THE END    FAMILY HISTORY OF SUBSTANCE ABUSE                 FEMALE         MALE                                                Alcohol                             [  ]1 pt          [  ]3pts                                               Illegal Durgs                     [  ]2 pts        [  ]3pts                                               Rx Drugs                           [  ]4 pts        [  ]4 pts    PERSONAL HISTORY OF SUBSTANCE ABUSE                                                                                          Alcohol                             [  ]3 pts       [  ]3 pts                                               Illegal Drugs                     [  ]4 pts        [x  ]4 pts                                               Rx Drugs                           [  ]5 pts        [  ]5 pts    AGE BETWEEN 16-45 YEARS                                      [  ]1 pt         [  ]1 pt    HISTORY OF PREADOLESCENT   SEXUAL ABUSE                                                             [  ]3 pts        [  ]0pts    PSYCHOLOGICAL DISEASE                     ADD, OCD, Bipolar, Schizophrenia        [ x ]2 pts         [  ]2 pts                      Depression                                               [ x ]1 pt           [  ]1 pt           SCORING TOTAL   (add numbers and type here)              (*7**)                                     A score of 3 or lower indicated LOW risk for future opioid abuse  A score of 4 to 7 indicated moderate risk for future opioid abuse  A score of 8 or higher indicates a high risk for opioid abuse 70 yo M with PMH of cerebral aneurysm s/p coil 2024, BPH, mitral valve regurgitation HTN, bipolar disorder, depression, anxiety, COPD, cervical radiculopathy now with enlarged prostate now scheduled for laser enucleation of the prostate on 10/8/2024.   ?  -Medical evaluation pending  -Cardiac evaluation pending  - Neurology evaluation pending   - Patient educated on ERP protocol (written/verbal)- verbalized understanding  -Educated on NSAIDS, multivitamins and herbals that increase the risk of bleeding and need to be stopped 5 days before procedure  -Educated on infection prevention  -Tylenol can be taken if needed for pain  -Will continue all other medications as prescribed  -Verbalized understanding of all instructions.    CAPRINI SCORE    AGE RELATED RISK FACTORS                                                             [ ] Age 41-60 years                                            (1 Point)  [x ] Age: 61-74 years                                           (2 Points)                 [ ] Age= 75 years                                                (3 Points)             DISEASE RELATED RISK FACTORS                                                       [ ] Edema in the lower extremities                 (1 Point)                     [ ] Varicose veins                                               (1 Point)                                 [ ] BMI > 25 Kg/m2                                            (1 Point)                                  [ ] Serious infection (ie PNA)                            (1 Point)                     [x ] Lung disease ( COPD, Emphysema)            (1 Point)                                                                          [ ] Acute myocardial infarction                         (1 Point)                  [ ] Congestive heart failure (in the previous month)  (1 Point)         [ ] Inflammatory bowel disease                            (1 Point)                  [ ] Central venous access, PICC or Port               (2 points)       (within the last month)                                                                [ ] Stroke (in the previous month)                        (5 Points)    [ ] Previous or present malignancy                       (2 points)                                                                                                                                                         HEMATOLOGY RELATED FACTORS                                                         [ ] Prior episodes of VTE                                     (3 Points)                     [ ] Positive family history for VTE                      (3 Points)                  [ ] Prothrombin 15830 A                                     (3 Points)                     [ ] Factor V Leiden                                                (3 Points)                        [ ] Lupus anticoagulants                                      (3 Points)                                                           [ ] Anticardiolipin antibodies                              (3 Points)                                                       [ ] High homocysteine in the blood                   (3 Points)                                             [ ] Other congenital or acquired thrombophilia      (3 Points)                                                [ ] Heparin induced thrombocytopenia                  (3 Points)                                        MOBILITY RELATED FACTORS  [ ] Bed rest                                                         (1 Point)  [ ] Plaster cast                                                    (2 points)  [ ] Bed bound for more than 72 hours           (2 Points)    GENDER SPECIFIC FACTORS  [ ] Pregnancy or had a baby within the last month   (1 Point)  [ ] Post-partum < 6 weeks                                   (1 Point)  [ ] Hormonal therapy  or oral contraception   (1 Point)  [ ] History of pregnancy complications              (1 point)  [ ] Unexplained or recurrent              (1 Point)    OTHER RISK FACTORS                                           (1 Point)  [x ] BMI >40, smoking, diabetes requiring insulin, chemotherapy  blood transfusions and length of surgery over 2 hours    SURGERY RELATED RISK FACTORS  [ ]  Section within the last month     (1 Point)  [ ] Minor surgery                                                  (1 Point)  [ ] Arthroscopic surgery                                       (2 Points)  [x ] Planned major surgery lasting more            (2 Points)      than 45 minutes     [ ] Elective hip or knee joint replacement       (5 points)       surgery                                                TRAUMA RELATED RISK FACTORS  [ ] Fracture of the hip, pelvis, or leg                       (5 Points)  [ ] Spinal cord injury resulting in paralysis             (5 points)       (in the previous month)    [ ] Paralysis  (less than 1 month)                             (5 Points)  [ ] Multiple Trauma within 1 month                        (5 Points)    Total Score [   6     ]    Caprini Score 0-2: Low Risk, NO VTE prophylaxis required for most patients, encourage ambulation  Caprini Score 3-6: Moderate Risk , pharmacologic VTE prophylaxis is indicated for most patients (in the absence of contraindications)  Caprini Score Greater than or =7: High risk, pharmocologic VTE prophylaxis indicated for most patients (in the absence of contraindications)                                    OPIOID RISK TOOL    SHAY EACH BOX THAT APPLIES AND ADD TOTALS AT THE END    FAMILY HISTORY OF SUBSTANCE ABUSE                 FEMALE         MALE                                                Alcohol                             [  ]1 pt          [  ]3pts                                               Illegal Durgs                     [  ]2 pts        [  ]3pts                                               Rx Drugs                           [  ]4 pts        [  ]4 pts    PERSONAL HISTORY OF SUBSTANCE ABUSE                                                                                          Alcohol                             [  ]3 pts       [  ]3 pts                                               Illegal Drugs                     [  ]4 pts        [x  ]4 pts                                               Rx Drugs                           [  ]5 pts        [  ]5 pts    AGE BETWEEN 16-45 YEARS                                      [  ]1 pt         [  ]1 pt    HISTORY OF PREADOLESCENT   SEXUAL ABUSE                                                             [  ]3 pts        [  ]0pts    PSYCHOLOGICAL DISEASE                     ADD, OCD, Bipolar, Schizophrenia        [  ]2 pts         [x  ]2 pts                      Depression                                               [ ]1 pt           [  x]1 pt           SCORING TOTAL   (add numbers and type here)              (*3**)                                     A score of 3 or lower indicated LOW risk for future opioid abuse  A score of 4 to 7 indicated moderate risk for future opioid abuse  A score of 8 or higher indicates a high risk for opioid abuse

## 2024-09-26 ENCOUNTER — APPOINTMENT (OUTPATIENT)
Dept: UROLOGY | Facility: HOSPITAL | Age: 69
End: 2024-09-26

## 2024-09-29 LAB
-  AMOXICILLIN/CLAVULANIC ACID: SIGNIFICANT CHANGE UP
-  AMOXICILLIN/CLAVULANIC ACID: SIGNIFICANT CHANGE UP
-  AMPICILLIN/SULBACTAM: SIGNIFICANT CHANGE UP
-  AMPICILLIN/SULBACTAM: SIGNIFICANT CHANGE UP
-  AMPICILLIN: SIGNIFICANT CHANGE UP
-  AMPICILLIN: SIGNIFICANT CHANGE UP
-  AZTREONAM: SIGNIFICANT CHANGE UP
-  AZTREONAM: SIGNIFICANT CHANGE UP
-  CEFAZOLIN: SIGNIFICANT CHANGE UP
-  CEFAZOLIN: SIGNIFICANT CHANGE UP
-  CEFEPIME: SIGNIFICANT CHANGE UP
-  CEFEPIME: SIGNIFICANT CHANGE UP
-  CEFOXITIN: SIGNIFICANT CHANGE UP
-  CEFOXITIN: SIGNIFICANT CHANGE UP
-  CEFTRIAXONE: SIGNIFICANT CHANGE UP
-  CEFTRIAXONE: SIGNIFICANT CHANGE UP
-  CIPROFLOXACIN: SIGNIFICANT CHANGE UP
-  CIPROFLOXACIN: SIGNIFICANT CHANGE UP
-  ERTAPENEM: SIGNIFICANT CHANGE UP
-  ERTAPENEM: SIGNIFICANT CHANGE UP
-  GENTAMICIN: SIGNIFICANT CHANGE UP
-  GENTAMICIN: SIGNIFICANT CHANGE UP
-  IMIPENEM: SIGNIFICANT CHANGE UP
-  IMIPENEM: SIGNIFICANT CHANGE UP
-  LEVOFLOXACIN: SIGNIFICANT CHANGE UP
-  LEVOFLOXACIN: SIGNIFICANT CHANGE UP
-  MEROPENEM: SIGNIFICANT CHANGE UP
-  MEROPENEM: SIGNIFICANT CHANGE UP
-  NITROFURANTOIN: SIGNIFICANT CHANGE UP
-  NITROFURANTOIN: SIGNIFICANT CHANGE UP
-  PIPERACILLIN/TAZOBACTAM: SIGNIFICANT CHANGE UP
-  PIPERACILLIN/TAZOBACTAM: SIGNIFICANT CHANGE UP
-  TOBRAMYCIN: SIGNIFICANT CHANGE UP
-  TOBRAMYCIN: SIGNIFICANT CHANGE UP
-  TRIMETHOPRIM/SULFAMETHOXAZOLE: SIGNIFICANT CHANGE UP
-  TRIMETHOPRIM/SULFAMETHOXAZOLE: SIGNIFICANT CHANGE UP
CULTURE RESULTS: ABNORMAL
METHOD TYPE: SIGNIFICANT CHANGE UP
METHOD TYPE: SIGNIFICANT CHANGE UP
ORGANISM # SPEC MICROSCOPIC CNT: ABNORMAL
ORGANISM # SPEC MICROSCOPIC CNT: ABNORMAL
ORGANISM # SPEC MICROSCOPIC CNT: SIGNIFICANT CHANGE UP
SPECIMEN SOURCE: SIGNIFICANT CHANGE UP

## 2024-10-02 DIAGNOSIS — N39.0 URINARY TRACT INFECTION, SITE NOT SPECIFIED: ICD-10-CM

## 2024-10-02 RX ORDER — CEFDINIR 300 MG/1
300 CAPSULE ORAL
Qty: 10 | Refills: 0 | Status: ACTIVE | COMMUNITY
Start: 2024-10-02 | End: 1900-01-01

## 2024-10-14 NOTE — BRIEF OPERATIVE NOTE - ANESTHESIOLOGIST NAME
Called and spoke with the patient's wife, she states that her  [place] what [place] think is a passed gallstone thru the bile duct causing pancreatitis and now he is vomiting all over and in sever pain, Mrs fregoso asked could we see the patient today, she rated his pain a 10/10 pain level.  Mrs Fregoso was advised to take her  to the ER for evaluation and pain control.  She asked for the address of [facility], address given to her.  Mrs Fregoso states that she will take him to the ER at [facility] [address].   Dr. Leach

## 2024-10-15 ENCOUNTER — APPOINTMENT (OUTPATIENT)
Dept: UROLOGY | Facility: HOSPITAL | Age: 69
End: 2024-10-15

## 2024-11-21 ENCOUNTER — APPOINTMENT (OUTPATIENT)
Dept: NEUROSURGERY | Facility: CLINIC | Age: 69
End: 2024-11-21
Payer: MEDICARE

## 2024-11-21 VITALS
HEART RATE: 86 BPM | DIASTOLIC BLOOD PRESSURE: 80 MMHG | HEIGHT: 70 IN | OXYGEN SATURATION: 95 % | WEIGHT: 130 LBS | TEMPERATURE: 97.9 F | SYSTOLIC BLOOD PRESSURE: 138 MMHG | BODY MASS INDEX: 18.61 KG/M2

## 2024-11-21 DIAGNOSIS — R51.9 HEADACHE, UNSPECIFIED: ICD-10-CM

## 2024-11-21 DIAGNOSIS — I67.1 CEREBRAL ANEURYSM, NONRUPTURED: ICD-10-CM

## 2024-11-21 PROCEDURE — 99214 OFFICE O/P EST MOD 30 MIN: CPT

## 2024-12-09 NOTE — H&P PST ADULT - NSSUBSTANCEUSE_GEN_ALL_CORE_SD
Prep Survey      Flowsheet Row Responses   Restoration facility patient discharged from? [place]   Is LACE score < 7 ? No   Eligibility Not Eligible   What are the reasons patient is not eligible? Behavioral Health  [ETOH intox]   Does the patient have one of the following disease processes/diagnoses(primary or secondary)? Other   Prep survey completed? Yes            Yolanda SANCHEZ - Registered Nurse          
street drug/inhalant/medication abuse/caffeine

## 2025-01-03 ENCOUNTER — APPOINTMENT (OUTPATIENT)
Dept: UROLOGY | Facility: CLINIC | Age: 70
End: 2025-01-03

## 2025-01-03 DIAGNOSIS — R35.1 BENIGN PROSTATIC HYPERPLASIA WITH LOWER URINARY TRACT SYMPMS: ICD-10-CM

## 2025-01-03 DIAGNOSIS — N40.1 BENIGN PROSTATIC HYPERPLASIA WITH LOWER URINARY TRACT SYMPMS: ICD-10-CM

## 2025-01-03 PROCEDURE — 99214 OFFICE O/P EST MOD 30 MIN: CPT

## 2025-02-13 ENCOUNTER — NON-APPOINTMENT (OUTPATIENT)
Age: 70
End: 2025-02-13

## 2025-02-26 ENCOUNTER — NON-APPOINTMENT (OUTPATIENT)
Age: 70
End: 2025-02-26

## 2025-03-03 ENCOUNTER — OUTPATIENT (OUTPATIENT)
Dept: OUTPATIENT SERVICES | Facility: HOSPITAL | Age: 70
LOS: 1 days | End: 2025-03-03
Payer: MEDICARE

## 2025-03-03 VITALS
HEART RATE: 65 BPM | OXYGEN SATURATION: 99 % | DIASTOLIC BLOOD PRESSURE: 70 MMHG | RESPIRATION RATE: 16 BRPM | HEIGHT: 70 IN | WEIGHT: 134.26 LBS | TEMPERATURE: 98 F | SYSTOLIC BLOOD PRESSURE: 140 MMHG

## 2025-03-03 DIAGNOSIS — Z98.890 OTHER SPECIFIED POSTPROCEDURAL STATES: Chronic | ICD-10-CM

## 2025-03-03 DIAGNOSIS — Z01.818 ENCOUNTER FOR OTHER PREPROCEDURAL EXAMINATION: ICD-10-CM

## 2025-03-03 DIAGNOSIS — Z29.9 ENCOUNTER FOR PROPHYLACTIC MEASURES, UNSPECIFIED: ICD-10-CM

## 2025-03-03 DIAGNOSIS — N40.0 BENIGN PROSTATIC HYPERPLASIA WITHOUT LOWER URINARY TRACT SYMPTOMS: ICD-10-CM

## 2025-03-03 DIAGNOSIS — I10 ESSENTIAL (PRIMARY) HYPERTENSION: ICD-10-CM

## 2025-03-03 DIAGNOSIS — Z71.89 OTHER SPECIFIED COUNSELING: ICD-10-CM

## 2025-03-03 LAB
A1C WITH ESTIMATED AVERAGE GLUCOSE RESULT: 5.5 % — SIGNIFICANT CHANGE UP (ref 4–5.6)
ANION GAP SERPL CALC-SCNC: 12 MMOL/L — SIGNIFICANT CHANGE UP (ref 5–17)
APPEARANCE UR: ABNORMAL
BACTERIA # UR AUTO: ABNORMAL /HPF
BASOPHILS # BLD AUTO: 0.06 K/UL — SIGNIFICANT CHANGE UP (ref 0–0.2)
BASOPHILS NFR BLD AUTO: 0.6 % — SIGNIFICANT CHANGE UP (ref 0–2)
BILIRUB UR-MCNC: NEGATIVE — SIGNIFICANT CHANGE UP
BUN SERPL-MCNC: 15.4 MG/DL — SIGNIFICANT CHANGE UP (ref 8–20)
CALCIUM SERPL-MCNC: 8.7 MG/DL — SIGNIFICANT CHANGE UP (ref 8.4–10.5)
CHLORIDE SERPL-SCNC: 103 MMOL/L — SIGNIFICANT CHANGE UP (ref 96–108)
CO2 SERPL-SCNC: 24 MMOL/L — SIGNIFICANT CHANGE UP (ref 22–29)
COD CRY URNS QL: PRESENT
COLOR SPEC: YELLOW — SIGNIFICANT CHANGE UP
COMMENT - URINE: SIGNIFICANT CHANGE UP
CREAT SERPL-MCNC: 1 MG/DL — SIGNIFICANT CHANGE UP (ref 0.5–1.3)
DIFF PNL FLD: ABNORMAL
EGFR: 82 ML/MIN/1.73M2 — SIGNIFICANT CHANGE UP
EGFR: 82 ML/MIN/1.73M2 — SIGNIFICANT CHANGE UP
EOSINOPHIL # BLD AUTO: 0.01 K/UL — SIGNIFICANT CHANGE UP (ref 0–0.5)
EOSINOPHIL NFR BLD AUTO: 0.1 % — SIGNIFICANT CHANGE UP (ref 0–6)
ESTIMATED AVERAGE GLUCOSE: 111 MG/DL — SIGNIFICANT CHANGE UP (ref 68–114)
GLUCOSE SERPL-MCNC: 95 MG/DL — SIGNIFICANT CHANGE UP (ref 70–99)
GLUCOSE UR QL: NEGATIVE MG/DL — SIGNIFICANT CHANGE UP
HCT VFR BLD CALC: 38.1 % — LOW (ref 39–50)
HGB BLD-MCNC: 11.9 G/DL — LOW (ref 13–17)
IMM GRANULOCYTES # BLD AUTO: 0.18 K/UL — HIGH (ref 0–0.07)
IMM GRANULOCYTES NFR BLD AUTO: 1.7 % — HIGH (ref 0–0.9)
KETONES UR-MCNC: NEGATIVE MG/DL — SIGNIFICANT CHANGE UP
LEUKOCYTE ESTERASE UR-ACNC: NEGATIVE — SIGNIFICANT CHANGE UP
LYMPHOCYTES # BLD AUTO: 1.35 K/UL — SIGNIFICANT CHANGE UP (ref 1–3.3)
LYMPHOCYTES NFR BLD AUTO: 12.7 % — LOW (ref 13–44)
MCHC RBC-ENTMCNC: 29.4 PG — SIGNIFICANT CHANGE UP (ref 27–34)
MCHC RBC-ENTMCNC: 31.2 G/DL — LOW (ref 32–36)
MCV RBC AUTO: 94.1 FL — SIGNIFICANT CHANGE UP (ref 80–100)
MONOCYTES # BLD AUTO: 0.32 K/UL — SIGNIFICANT CHANGE UP (ref 0–0.9)
MONOCYTES NFR BLD AUTO: 3 % — SIGNIFICANT CHANGE UP (ref 2–14)
NEUTROPHILS # BLD AUTO: 8.72 K/UL — HIGH (ref 1.8–7.4)
NEUTROPHILS NFR BLD AUTO: 81.9 % — HIGH (ref 43–77)
NITRITE UR-MCNC: POSITIVE
NRBC # BLD AUTO: 0 K/UL — SIGNIFICANT CHANGE UP (ref 0–0)
NRBC # FLD: 0 K/UL — SIGNIFICANT CHANGE UP (ref 0–0)
NRBC BLD AUTO-RTO: 0 /100 WBCS — SIGNIFICANT CHANGE UP (ref 0–0)
PH UR: >=9 (ref 5–8)
PLATELET # BLD AUTO: 491 K/UL — HIGH (ref 150–400)
PMV BLD: 9 FL — SIGNIFICANT CHANGE UP (ref 7–13)
POTASSIUM SERPL-MCNC: 4.2 MMOL/L — SIGNIFICANT CHANGE UP (ref 3.5–5.3)
POTASSIUM SERPL-SCNC: 4.2 MMOL/L — SIGNIFICANT CHANGE UP (ref 3.5–5.3)
PROT UR-MCNC: SIGNIFICANT CHANGE UP MG/DL
RBC # BLD: 4.05 M/UL — LOW (ref 4.2–5.8)
RBC # FLD: 13.6 % — SIGNIFICANT CHANGE UP (ref 10.3–14.5)
RBC CASTS # UR COMP ASSIST: 4 /HPF — SIGNIFICANT CHANGE UP (ref 0–4)
SODIUM SERPL-SCNC: 139 MMOL/L — SIGNIFICANT CHANGE UP (ref 135–145)
SP GR SPEC: 1.02 — SIGNIFICANT CHANGE UP (ref 1–1.03)
UROBILINOGEN FLD QL: 0.2 MG/DL — SIGNIFICANT CHANGE UP (ref 0.2–1)
WBC # BLD: 10.64 K/UL — HIGH (ref 3.8–10.5)
WBC # FLD AUTO: 10.64 K/UL — HIGH (ref 3.8–10.5)
WBC UR QL: 1 /HPF — SIGNIFICANT CHANGE UP (ref 0–5)

## 2025-03-03 PROCEDURE — 93005 ELECTROCARDIOGRAM TRACING: CPT

## 2025-03-03 PROCEDURE — 87186 SC STD MICRODIL/AGAR DIL: CPT

## 2025-03-03 PROCEDURE — 87086 URINE CULTURE/COLONY COUNT: CPT

## 2025-03-03 PROCEDURE — 93010 ELECTROCARDIOGRAM REPORT: CPT

## 2025-03-03 PROCEDURE — 85025 COMPLETE CBC W/AUTO DIFF WBC: CPT

## 2025-03-03 PROCEDURE — 81001 URINALYSIS AUTO W/SCOPE: CPT

## 2025-03-03 PROCEDURE — 80048 BASIC METABOLIC PNL TOTAL CA: CPT

## 2025-03-03 PROCEDURE — 36415 COLL VENOUS BLD VENIPUNCTURE: CPT

## 2025-03-03 PROCEDURE — 87077 CULTURE AEROBIC IDENTIFY: CPT

## 2025-03-03 PROCEDURE — 83036 HEMOGLOBIN GLYCOSYLATED A1C: CPT

## 2025-03-03 PROCEDURE — G0463: CPT

## 2025-03-03 RX ORDER — DOXYCYCLINE HYCLATE 100 MG
0 TABLET ORAL
Refills: 0 | DISCHARGE

## 2025-03-03 RX ORDER — BUSPIRONE HYDROCHLORIDE 15 MG/1
1 TABLET ORAL
Refills: 0 | DISCHARGE

## 2025-03-03 RX ORDER — ALBUTEROL SULFATE 2.5 MG/3ML
2 VIAL, NEBULIZER (ML) INHALATION
Refills: 0 | DISCHARGE

## 2025-03-03 RX ORDER — DOXYCYCLINE HYCLATE 100 MG
1 TABLET ORAL
Refills: 0 | DISCHARGE

## 2025-03-03 RX ORDER — PREDNISONE 20 MG/1
2 TABLET ORAL
Refills: 0 | DISCHARGE

## 2025-03-03 NOTE — H&P PST ADULT - NEUROLOGICAL COMMENTS
hx aneurysm repair 2023 fine hand tremors noted hx aneurysm repair 2023  occ headaches / hand tremors

## 2025-03-03 NOTE — H&P PST ADULT - NSICDXPASTSURGICALHX_GEN_ALL_CORE_FT
PAST SURGICAL HISTORY:  H/O cerebral aneurysm repair      PAST SURGICAL HISTORY:  H/O cerebral aneurysm repair     History of bladder surgery

## 2025-03-03 NOTE — H&P PST ADULT - PRIMARY CARE PROVIDER
EMERGENCY DEPARTMENT ENCOUNTER    Pt Name: Sharla Gomez  MRN: [id number]  [place] 1959  Date of evaluation: 4/11/21  CHIEF COMPLAINT       Chief Complaint   Patient presents with    Shoulder Pain    Neck Pain     HISTORY OF PRESENT ILLNESS   HPI  59-year-old female with a history of chronic neck pain presents for severe worsening of her neck pain. Patient states that about a month ago she fell and hit the back of her neck on a windowsill, at that time had imaging which is negative for acute injury. Patient states the last few days her neck pain has been severe, more on the right side but also midline. Medications at home are not helping. Denies fever/chills, nausea/vomiting, weakness/numbness or any other acute complaints. REVIEW OF SYSTEMS     Review of Systems   All other systems reviewed and are negative. PASTMEDICAL HISTORY     Past Medical History:   Diagnosis Date    Adenomatous polyp 5/1/2017    Arthritis     DDD    Chronic prescription opiate use 10/11/2018    Depression     Generalized anxiety disorder 1/22/2016    Hyperlipidemia     Methadone dependence ([facility] [address] 1/22/2016    Severe episode of recurrent major depressive disorder, without psychotic features ([address]     Subarachnoid hemorrhage ([address] 7/28/2019    Tobacco use disorder, continuous 1/22/2016     SURGICAL HISTORY       Past Surgical History:   Procedure Laterality Date    ANKLE FRACTURE SURGERY Left     APPENDECTOMY      BACK SURGERY      fusion, hardware. Dr. Joel De La Rosa    COLONOSCOPY  05/04/2017    HYPERPLASTIC POLYP       COLONOSCOPY  04/24/2017    ADENOMATOUS POLYP.      DILATION AND CURETTAGE OF UTERUS      FOOT SURGERY      KNEE SURGERY Bilateral     RI COLONOSCOPY W/BIOPSY SINGLE/MULTIPLE N/A 4/24/2017    COLONOSCOPY WITH BIOPSY performed by Robe Dent MD at [address] W/BIOPSY SINGLE/MULTIPLE N/A 5/4/2017    COLONOSCOPY WITH BIOPSY performed by Robe Dent MD at [address] [place]  [place] EGD TRANSORAL BIOPSY SINGLE/MULTIPLE N/A 5/2/2017    EGD BIOPSY performed by Verito Bower MD at [facility] ENDOSCOPY  05/02/2017     CURRENT MEDICATIONS       Discharge Medication List as of 4/10/2021  6:16 PM      CONTINUE these medications which have NOT CHANGED    Details   verapamil (CALAN SR) 240 MG extended release tablet TAKE ONE TABLET BY MOUTH DAILY, Disp-90 tablet, R-0Normal      LORazepam (ATIVAN) 1 MG tablet Historical Med      sertraline (ZOLOFT) 100 MG tablet Take 1.5 tablets by mouth daily, Disp-135 tablet, R-0Normal      butalbital-acetaminophen-caffeine (FIORICET, ESGIC) -40 MG per tablet Take 1 tab only for severe headache. Can repeat after 4 hrs if needed  Max 2 tabs/24 hrs. Max 4 tabs/week, Disp-14 tablet, R-1, DAWNormal      prazosin (MINIPRESS) 1 MG capsule Take 1 capsule by mouth nightly, Disp-30 capsule, R-5Normal      !! cyclobenzaprine (FLEXERIL) 10 MG tablet Take 1 tablet by mouth 2 times daily as needed for Muscle spasms, Disp-21 tablet, R-2Normal      diphenhydrAMINE (BENADRYL ALLERGY) 25 MG capsule Take 1 capsule by mouth 2 times daily as needed (Severe headache with nausea and vomiting.), Disp-28 capsule, R-2Normal      promethazine (PHENERGAN) 12.5 MG tablet Take 1 tablet by mouth 2 times daily as needed for Nausea (moderate to severe headache), Disp-28 tablet, R-2Normal      ergocalciferol (ERGOCALCIFEROL) 1.25 MG (47130 UT) capsule Take 50,000 Units by mouth once a weekHistorical Med      polyethylene glycol (GLYCOLAX) 17 GM/SCOOP powder Follow instructions provided to you from physician's office. , Disp-238 g, R-0Normal      bisacodyl (BISACODYL) 5 MG EC tablet Follow instructions provided given by the physician's office. , Disp-4 tablet, R-0Normal      magnesium citrate solution Take 296 mLs by mouth once for 1 dose, Disp-296 mL, R-0Normal      dicyclomine (BENTYL) 20 MG tablet Take 1 tablet by mouth every 6 hours, Disp-40 tablet,R-0Print      ondansetron (ZOFRAN ODT) 4 MG disintegrating tablet Take 1 tablet by mouth every 8 hours as needed for Nausea, Disp-20 tablet,R-0Print      gabapentin (NEURONTIN) 600 MG tablet TAKE 1 TABLET TWICE A DAY, Disp-90 tablet, R-8Normal      omeprazole (PRILOSEC) 40 MG delayed release capsule TAKE 1 CAPSULE DAILY, Disp-90 capsule, R-3Normal      albuterol sulfate HFA (VENTOLIN HFA) 108 (90 Base) MCG/ACT inhaler Inhale 2 puffs into the lungs 4 times daily as needed for Wheezing, Disp-3 Inhaler, R-1Normal       !! - Potential duplicate medications found. Please discuss with provider. ALLERGIES     is allergic to penicillins. FAMILY HISTORY     She indicated that her mother is . She indicated that her father is . She indicated that two of her three sisters are alive. She indicated that her maternal grandmother is . She indicated that her maternal grandfather is . She indicated that her paternal grandmother is . She indicated that her paternal grandfather is . She indicated that all of her three daughters are alive. SOCIAL HISTORY       Social History     Tobacco Use    Smoking status: Current Every Day Smoker     Packs/day: 1.00     Years: 40.00     Pack years: 40.00     Types: Cigarettes    Smokeless tobacco: Never Used    Tobacco comment: almost off cigarettes, but now vaping. Substance Use Topics    Alcohol use: No     Alcohol/week: 0.0 standard drinks     Comment: denies any heavy use.  Drug use: Yes     Types: Marijuana     Comment: Medical marijuana card verified. once a day     PHYSICAL EXAM     INITIAL VITALS: /85   Pulse 101   Temp 98.5 °F (36.9 °C)   Resp 14   Wt 225 lb (102.1 kg)   SpO2 95%   BMI 35.24 kg/m²    Physical Exam  Constitutional:       General: She is not in acute distress. Appearance: Normal appearance. She is normal weight. HENT:      Head: Normocephalic and atraumatic. Right Ear: Tympanic membrane and external ear normal.      Left Ear: Tympanic membrane and external ear normal.      Nose: Nose normal.      Mouth/Throat:      Mouth: Mucous membranes are moist.   Eyes:      Extraocular Movements: Extraocular movements intact. Conjunctiva/sclera: Conjunctivae normal.      Pupils: Pupils are equal, round, and reactive to light. Neck:      Comments: Diffuse neck tenderness including midline and right trapezius, no step-off or deformity, trachea midline, no external signs of trauma to the neck. Cardiovascular:      Rate and Rhythm: Normal rate and regular rhythm. Pulses: Normal pulses. Heart sounds: Normal heart sounds. No murmur. Pulmonary:      Effort: Pulmonary effort is normal. No respiratory distress. Breath sounds: Normal breath sounds. No wheezing or rhonchi. Abdominal:      General: Bowel sounds are normal. There is no distension. Palpations: Abdomen is soft. There is no mass. Tenderness: There is no abdominal tenderness. There is no right CVA tenderness, left CVA tenderness, guarding or rebound. Musculoskeletal:         General: No swelling or deformity. Skin:     Capillary Refill: Capillary refill takes less than 2 seconds. Coloration: Skin is not jaundiced. Findings: No bruising, erythema or rash. Neurological:      General: No focal deficit present. Mental Status: She is alert and oriented to person, place, and time. Cranial Nerves: No cranial nerve deficit. Sensory: No sensory deficit.    Psychiatric:         Mood and Affect: Mood normal.         Behavior: Behavior normal.         MEDICAL DECISION MAKING:     - CT c-spine  - IM norflex, fentanyl  - po valium    DIAGNOSTIC RESULTS   EKG:All EKG's are interpreted by the Emergency Department Physician who either signs or Co-signs this chart in the absence of a cardiologist.        RADIOLOGY:All plain film, CT, MRI, and formal ultrasound images (except ED bedside ultrasound) are read by the radiologist, see reports below, unless otherwisenoted in MDM or here. CT CERVICAL SPINE WO CONTRAST   Final Result   No acute abnormality of the cervical spine. LABS: All lab results were reviewed by myself, and all abnormals are listed below. Labs Reviewed - No data to display    EMERGENCY DEPARTMENTCOURSE:     CT without acute traumatic injury, patient feels improved with pain medications, no signs or symptoms of cord compression on exam or history. Will discharge with return precautions and outpatient follow-up. Vitals:    Vitals:    04/10/21 1444 04/10/21 1445   BP:  125/85   Pulse: 101    Resp: 14    Temp: 98.5 °F (36.9 °C)    SpO2: 95%    Weight: 225 lb (102.1 kg)        The patient was given the following medications while in the emergency department:  Orders Placed This Encounter   Medications    fentaNYL (SUBLIMAZE) injection 50 mcg    orphenadrine (NORFLEX) injection 60 mg    diazePAM (VALIUM) tablet 5 mg    cyclobenzaprine (FLEXERIL) 10 MG tablet     Sig: Take 1 tablet by mouth 3 times daily as needed for Muscle spasms     Dispense:  21 tablet     Refill:  0    HYDROcodone-acetaminophen (NORCO) 5-325 MG per tablet     Sig: Take 1 tablet by mouth every 6 hours as needed for Pain for up to 3 days. Intended supply: 3 days. Take lowest dose possible to manage pain     Dispense:  10 tablet     Refill:  0     CONSULTS:  None    FINAL IMPRESSION      1. Acute neck pain    2.  Cervical radiculopathy          DISPOSITION/PLAN   DISPOSITION Decision To Discharge 04/10/2021 06:15:12 PM      PATIENT REFERRED TO:  Winston Loco MD  [address] [place] [address]  [phone]    Schedule an appointment as soon as possible for a visit       DISCHARGE MEDICATIONS:  Discharge Medication List as of 4/10/2021  6:16 PM      START taking these medications    Details   !! cyclobenzaprine (FLEXERIL) 10 MG tablet Take 1 tablet by mouth 3 times daily as needed for Muscle spasms, Disp-21 tablet, R-0Print      HYDROcodone-acetaminophen (NORCO) 5-325 MG per tablet Take 1 tablet by mouth every 6 hours as needed for Pain for up to 3 days. Intended supply: 3 days. Take lowest dose possible to manage pain, Disp-10 tablet, R-0Print       !! - Potential duplicate medications found. Please discuss with provider.         Errol Smith MD  Attending Emergency Physician             Errol Smith MD  04/11/21 1973

## 2025-03-03 NOTE — H&P PST ADULT - PROBLEM SELECTOR PLAN 1
Scheduled for laser enucleation of the prostate on 3/21/25    medical and cardiac clearances . Neuro note requested by surgeon.    To repeat CXR prop s/w yosef at DR. Tejeda office

## 2025-03-03 NOTE — H&P PST ADULT - HISTORY OF PRESENT ILLNESS
68 yo M with PMH of cerebral aneurysm s/p coil 4/12/2024, BPH, HTN, bipolar disorder, mitral valve regurgitation, depression, anxiety, COPD, cervical radiculopathy presents to PST today. Pt is a pour historian  Pt. reports hx of urinary retention 4/2024 and went to [facility] . Currently with urinary catheter draining yellow urine. Cystoscopy performed by Dr. Tejeda- enlarged obstructing prostate. Scheduled for laser enucleation of the prostate on 10/8/2024.   ?   70 yo M presents to PST with PMH of cerebral aneurysm s/p clipping 4/12/2024, hand tremors, headaches, BPH, HTN, bipolar disorder, mitral valve regurgitation, depression, anxiety, COPD, cervical radiculopathy  enlarged prostate  and recent hemoptysis with chest CT showing  left upper opacity ( 2/28/25, on antibiotic and steroids)  Pt presents to [facility] today with Marissa his  ,  Pt. reports hx of urinary retention  since 4/2024 and went to [facility] . Currently with urinary catheter draining yellow urine. Cystoscopy performed by Dr. Tejeda- enlarged obstructing prostate noted . Pt was Scheduled for laser enucleation of the prostate on 10/8/2024 but states surgeon cancelled , now presents or surgery on 3/21/25.   ?

## 2025-03-03 NOTE — H&P PST ADULT - PROBLEM SELECTOR PLAN 2
To repeat CXR prop s/w yosef at DR. Tejeda office   continue prednisone and antibiotic  as prescribed   medical clearance

## 2025-03-03 NOTE — H&P PST ADULT - NS HP PST ANES REACTION
Initial Decision For Surgery?: Yes No Date Of Surgery - Today Or Tomorrow?: today Risk Assessment Explanation (Does Not Render In The Note): Clinical determination of the probability and/or consequences of an event, such as surgery. Clinical assessment of the level of risk is affected by the nature of the event under consideration for the patient. Modifier 57 is used to indicate an Evaluation and Management (E/M) service resulted in the initial decision to perform surgery either the day before a major surgery (90 day global) or the day of a major surgery. Complexity (Necessary For Coding; Major - 90 Day Global With Some Exceptions; Minor - 10 Day Global): major

## 2025-03-03 NOTE — H&P PST ADULT - NSICDXPASTMEDICALHX_GEN_ALL_CORE_FT
PAST MEDICAL HISTORY:  Bipolar disorder     BPH (benign prostatic hyperplasia)     Cervical radiculopathy     COPD, severe     Depression     Emphysema/COPD     History of cerebral aneurysm     History of upper respiratory infection     Tremors of nervous system     Urinary retention gonzalez catheter 2/12/1inserted

## 2025-03-03 NOTE — H&P PST ADULT - RESPIRATORY COMMENTS
2/28/25 hemoptysis and cough left upper opacity noted on ct scan in [place] [facility] on antibiotic and prednisone  feeling well today denies cough or dyspnea  lungs clear

## 2025-03-03 NOTE — H&P PST ADULT - ASSESSMENT
70 yo M presents to PST with PMH of cerebral aneurysm s/p clipping 2024, hand tremors, headaches, BPH, HTN, bipolar disorder, mitral valve regurgitation, depression, anxiety, COPD, cervical radiculopathy  enlarged prostate  and recent hemoptysis with chest CT showing  left upper opacity ( 25, on antibiotic and steroids)  Pt presents to [facility] today with Marissa his  ,  Pt. reports hx of urinary retention  since 2024 and went to [facility] . Currently with urinary catheter draining yellow urine. Cystoscopy performed by Dr. Tejeda- enlarged obstructing prostate noted . Pt was Scheduled for laser enucleation of the prostate on 10/8/2024 but states surgeon cancelled , now presents or surgery on 3/21/25. medical and cardiac clearances . Neuro note requested by surgeon.  To repeat CXR prop s/w yosef at DR. Tejeda office and email sent regarding recent URI. propranolol  and buspirone as prescribed.   OPIOID RISK TOOL    SHAY EACH BOX THAT APPLIES AND ADD TOTALS AT THE END    FAMILY HISTORY OF SUBSTANCE ABUSE                 FEMALE         MALE                                                Alcohol                             [  ]1 pt          [  ]3pts                                               Illegal Durgs                     [  ]2 pts        [  ]3pts                                               Rx Drugs                           [  ]4 pts        [  ]4 pts    PERSONAL HISTORY OF SUBSTANCE ABUSE                                                                                          Alcohol                             [  ]3 pts       [  ]3 pts                                               Illegal Durgs                     [  ]4 pts        [  ]4 pts                                               Rx Drugs                           [  ]5 pts        [  ]5 pts    AGE BETWEEN 16-45 YEARS                                      [  ]1 pt         [  ]1 pt    HISTORY OF PREADOLESCENT   SEXUAL ABUSE                                                             [  ]3 pts        [  ]0pts    PSYCHOLOGICAL DISEASE                     ADD, OCD, Bipolar, Schizophrenia        [  ]2 pts         [ X ]2 pts                      Depression                                               [  ]1 pt           [  ]1 pt           SCORING TOTAL   (add numbers and type here)              (*2**)                                     A score of 3 or lower indicated LOW risk for future opiod abuse  A score of 4 to 7 indicated moderate risk for future opiod abuse  A score of 8 or higher indicates a high risk for opiod abuse  ?  CAPRINI SCORE    AGE RELATED RISK FACTORS                                                             [ ] Age 41-60 years                                            (1 Point)  [X ] Age: 61-74 years                                           (2 Points)                 [ ] Age= 75 years                                                (3 Points)             DISEASE RELATED RISK FACTORS                                                       [ ] Edema in the lower extremities                 (1 Point)                     [ ] Varicose veins                                               (1 Point)                                 [X ] BMI > 25 Kg/m2                                            (1 Point)                                  [ ] Serious infection (ie PNA)                            (1 Point)                     [X ] Lung disease ( COPD, Emphysema)            (1 Point)                                                                          [ ] Acute myocardial infarction                         (1 Point)                  [ ] Congestive heart failure (in the previous month)  (1 Point)         [ ] Inflammatory bowel disease                            (1 Point)                  [ ] Central venous access, PICC or Port               (2 points)       (within the last month)                                                                [ ] Stroke (in the previous month)                        (5 Points)    [ ] Previous or present malignancy                       (2 points)                                                                                                                                                         HEMATOLOGY RELATED FACTORS                                                         [ ] Prior episodes of VTE                                     (3 Points)                     [ ] Positive family history for VTE                      (3 Points)                  [ ] Prothrombin 18034 A                                     (3 Points)                     [ ] Factor V Leiden                                                (3 Points)                        [ ] Lupus anticoagulants                                      (3 Points)                                                           [ ] Anticardiolipin antibodies                              (3 Points)                                                       [ ] High homocysteine in the blood                   (3 Points)                                             [ ] Other congenital or acquired thrombophilia      (3 Points)                                                [ ] Heparin induced thrombocytopenia                  (3 Points)                                        MOBILITY RELATED FACTORS  [ ] Bed rest                                                         (1 Point)  [ ] Plaster cast                                                    (2 points)  [ ] Bed bound for more than 72 hours           (2 Points)    GENDER SPECIFIC FACTORS  [ ] Pregnancy or had a baby within the last month   (1 Point)  [ ] Post-partum < 6 weeks                                   (1 Point)  [ ] Hormonal therapy  or oral contraception   (1 Point)  [ ] History of pregnancy complications              (1 point)  [ ] Unexplained or recurrent              (1 Point)    OTHER RISK FACTORS                                           (1 Point)  [X ] BMI >40, smoking, diabetes requiring insulin, chemotherapy  blood transfusions and length of surgery over 2 hours    SURGERY RELATED RISK FACTORS  [ ]  Section within the last month     (1 Point)  [ ] Minor surgery                                                  (1 Point)  [ ] Arthroscopic surgery                                       (2 Points)  [X ] Planned major surgery lasting more            (2 Points)      than 45 minutes     [ ] Elective hip or knee joint replacement       (5 points)       surgery                                                TRAUMA RELATED RISK FACTORS  [ ] Fracture of the hip, pelvis, or leg                       (5 Points)  [ ] Spinal cord injury resulting in paralysis             (5 points)       (in the previous month)    [ ] Paralysis  (less than 1 month)                             (5 Points)  [ ] Multiple Trauma within 1 month                        (5 Points)    Total Score [    7    ]    Caprini Score 0-2: Low Risk, NO VTE prophylaxis required for most patients, encourage ambulation  Caprini Score 3-6: Moderate Risk , pharmacologic VTE prophylaxis is indicated for most patients (in the absence of contraindications)  Caprini Score Greater than or =7: High risk, pharmocologic VTE prophylaxis indicated for most patients (in the absence of contraindications)

## 2025-03-05 LAB
-  AMOXICILLIN/CLAVULANIC ACID: SIGNIFICANT CHANGE UP
-  AMPICILLIN/SULBACTAM: SIGNIFICANT CHANGE UP
-  AMPICILLIN: SIGNIFICANT CHANGE UP
-  AZTREONAM: SIGNIFICANT CHANGE UP
-  CEFAZOLIN: SIGNIFICANT CHANGE UP
-  CEFEPIME: SIGNIFICANT CHANGE UP
-  CEFOXITIN: SIGNIFICANT CHANGE UP
-  CEFTRIAXONE: SIGNIFICANT CHANGE UP
-  CIPROFLOXACIN: SIGNIFICANT CHANGE UP
-  ERTAPENEM: SIGNIFICANT CHANGE UP
-  GENTAMICIN: SIGNIFICANT CHANGE UP
-  IMIPENEM: SIGNIFICANT CHANGE UP
-  LEVOFLOXACIN: SIGNIFICANT CHANGE UP
-  MEROPENEM: SIGNIFICANT CHANGE UP
-  NITROFURANTOIN: SIGNIFICANT CHANGE UP
-  PIPERACILLIN/TAZOBACTAM: SIGNIFICANT CHANGE UP
-  TOBRAMYCIN: SIGNIFICANT CHANGE UP
-  TRIMETHOPRIM/SULFAMETHOXAZOLE: SIGNIFICANT CHANGE UP
CULTURE RESULTS: ABNORMAL
METHOD TYPE: SIGNIFICANT CHANGE UP
ORGANISM # SPEC MICROSCOPIC CNT: ABNORMAL
ORGANISM # SPEC MICROSCOPIC CNT: SIGNIFICANT CHANGE UP
SPECIMEN SOURCE: SIGNIFICANT CHANGE UP

## 2025-04-12 ENCOUNTER — NON-APPOINTMENT (OUTPATIENT)
Age: 70
End: 2025-04-12

## 2025-04-30 ENCOUNTER — OUTPATIENT (OUTPATIENT)
Dept: OUTPATIENT SERVICES | Facility: HOSPITAL | Age: 70
LOS: 1 days | End: 2025-04-30
Payer: MEDICARE

## 2025-04-30 ENCOUNTER — NON-APPOINTMENT (OUTPATIENT)
Age: 70
End: 2025-04-30

## 2025-04-30 VITALS
HEART RATE: 63 BPM | TEMPERATURE: 98 F | SYSTOLIC BLOOD PRESSURE: 120 MMHG | HEIGHT: 70 IN | OXYGEN SATURATION: 99 % | RESPIRATION RATE: 16 BRPM | DIASTOLIC BLOOD PRESSURE: 70 MMHG | WEIGHT: 127.87 LBS

## 2025-04-30 DIAGNOSIS — Z01.818 ENCOUNTER FOR OTHER PREPROCEDURAL EXAMINATION: ICD-10-CM

## 2025-04-30 DIAGNOSIS — I38 ENDOCARDITIS, VALVE UNSPECIFIED: ICD-10-CM

## 2025-04-30 DIAGNOSIS — Z13.89 ENCOUNTER FOR SCREENING FOR OTHER DISORDER: ICD-10-CM

## 2025-04-30 DIAGNOSIS — Z29.9 ENCOUNTER FOR PROPHYLACTIC MEASURES, UNSPECIFIED: ICD-10-CM

## 2025-04-30 DIAGNOSIS — F31.9 BIPOLAR DISORDER, UNSPECIFIED: ICD-10-CM

## 2025-04-30 DIAGNOSIS — Z98.890 OTHER SPECIFIED POSTPROCEDURAL STATES: Chronic | ICD-10-CM

## 2025-04-30 DIAGNOSIS — N40.0 BENIGN PROSTATIC HYPERPLASIA WITHOUT LOWER URINARY TRACT SYMPTOMS: ICD-10-CM

## 2025-04-30 DIAGNOSIS — I67.1 CEREBRAL ANEURYSM, NONRUPTURED: ICD-10-CM

## 2025-04-30 LAB
A1C WITH ESTIMATED AVERAGE GLUCOSE RESULT: 5.8 % — HIGH (ref 4–5.6)
AMORPH CRY # UR COMP ASSIST: PRESENT
ANION GAP SERPL CALC-SCNC: 10 MMOL/L — SIGNIFICANT CHANGE UP (ref 5–17)
APPEARANCE UR: ABNORMAL
APTT BLD: 39.3 SEC — HIGH (ref 26.1–36.8)
BACTERIA # UR AUTO: ABNORMAL /HPF
BASOPHILS # BLD AUTO: 0.12 K/UL — SIGNIFICANT CHANGE UP (ref 0–0.2)
BASOPHILS NFR BLD AUTO: 1.4 % — SIGNIFICANT CHANGE UP (ref 0–2)
BILIRUB UR-MCNC: NEGATIVE — SIGNIFICANT CHANGE UP
BLD GP AB SCN SERPL QL: SIGNIFICANT CHANGE UP
BUN SERPL-MCNC: 14.3 MG/DL — SIGNIFICANT CHANGE UP (ref 8–20)
CALCIUM SERPL-MCNC: 8.9 MG/DL — SIGNIFICANT CHANGE UP (ref 8.4–10.5)
CAST: 6 /LPF — HIGH (ref 0–4)
CHLORIDE SERPL-SCNC: 102 MMOL/L — SIGNIFICANT CHANGE UP (ref 96–108)
CO2 SERPL-SCNC: 26 MMOL/L — SIGNIFICANT CHANGE UP (ref 22–29)
COLOR SPEC: SIGNIFICANT CHANGE UP
CREAT SERPL-MCNC: 1.19 MG/DL — SIGNIFICANT CHANGE UP (ref 0.5–1.3)
DIFF PNL FLD: ABNORMAL
EGFR: 66 ML/MIN/1.73M2 — SIGNIFICANT CHANGE UP
EGFR: 66 ML/MIN/1.73M2 — SIGNIFICANT CHANGE UP
EOSINOPHIL # BLD AUTO: 0.27 K/UL — SIGNIFICANT CHANGE UP (ref 0–0.5)
EOSINOPHIL NFR BLD AUTO: 3 % — SIGNIFICANT CHANGE UP (ref 0–6)
ESTIMATED AVERAGE GLUCOSE: 120 MG/DL — HIGH (ref 68–114)
GLUCOSE SERPL-MCNC: 92 MG/DL — SIGNIFICANT CHANGE UP (ref 70–99)
GLUCOSE UR QL: NEGATIVE MG/DL — SIGNIFICANT CHANGE UP
HCT VFR BLD CALC: 43.9 % — SIGNIFICANT CHANGE UP (ref 39–50)
HGB BLD-MCNC: 13.9 G/DL — SIGNIFICANT CHANGE UP (ref 13–17)
IMM GRANULOCYTES # BLD AUTO: 0.02 K/UL — SIGNIFICANT CHANGE UP (ref 0–0.07)
IMM GRANULOCYTES NFR BLD AUTO: 0.2 % — SIGNIFICANT CHANGE UP (ref 0–0.9)
INR BLD: 0.96 RATIO — SIGNIFICANT CHANGE UP (ref 0.85–1.16)
KETONES UR-MCNC: ABNORMAL MG/DL
LEUKOCYTE ESTERASE UR-ACNC: ABNORMAL
LYMPHOCYTES # BLD AUTO: 2.62 K/UL — SIGNIFICANT CHANGE UP (ref 1–3.3)
LYMPHOCYTES NFR BLD AUTO: 29.5 % — SIGNIFICANT CHANGE UP (ref 13–44)
MCHC RBC-ENTMCNC: 28.8 PG — SIGNIFICANT CHANGE UP (ref 27–34)
MCHC RBC-ENTMCNC: 31.7 G/DL — LOW (ref 32–36)
MCV RBC AUTO: 91.1 FL — SIGNIFICANT CHANGE UP (ref 80–100)
MONOCYTES # BLD AUTO: 0.89 K/UL — SIGNIFICANT CHANGE UP (ref 0–0.9)
MONOCYTES NFR BLD AUTO: 10 % — SIGNIFICANT CHANGE UP (ref 2–14)
NEUTROPHILS # BLD AUTO: 4.95 K/UL — SIGNIFICANT CHANGE UP (ref 1.8–7.4)
NEUTROPHILS NFR BLD AUTO: 55.9 % — SIGNIFICANT CHANGE UP (ref 43–77)
NITRITE UR-MCNC: POSITIVE
NRBC # BLD AUTO: 0 K/UL — SIGNIFICANT CHANGE UP (ref 0–0)
NRBC # FLD: 0 K/UL — SIGNIFICANT CHANGE UP (ref 0–0)
NRBC BLD AUTO-RTO: 0 /100 WBCS — SIGNIFICANT CHANGE UP (ref 0–0)
PH UR: 6 — SIGNIFICANT CHANGE UP (ref 5–8)
PLATELET # BLD AUTO: 432 K/UL — HIGH (ref 150–400)
PMV BLD: 9.6 FL — SIGNIFICANT CHANGE UP (ref 7–13)
POTASSIUM SERPL-MCNC: 4.4 MMOL/L — SIGNIFICANT CHANGE UP (ref 3.5–5.3)
POTASSIUM SERPL-SCNC: 4.4 MMOL/L — SIGNIFICANT CHANGE UP (ref 3.5–5.3)
PROT UR-MCNC: 30 MG/DL
PROTHROM AB SERPL-ACNC: 10.9 SEC — SIGNIFICANT CHANGE UP (ref 9.9–13.4)
RBC # BLD: 4.82 M/UL — SIGNIFICANT CHANGE UP (ref 4.2–5.8)
RBC # FLD: 14.5 % — SIGNIFICANT CHANGE UP (ref 10.3–14.5)
RBC CASTS # UR COMP ASSIST: 12 /HPF — HIGH (ref 0–4)
SODIUM SERPL-SCNC: 138 MMOL/L — SIGNIFICANT CHANGE UP (ref 135–145)
SP GR SPEC: 1.02 — SIGNIFICANT CHANGE UP (ref 1–1.03)
SQUAMOUS # UR AUTO: 3 /HPF — SIGNIFICANT CHANGE UP (ref 0–5)
UROBILINOGEN FLD QL: 1 MG/DL — SIGNIFICANT CHANGE UP (ref 0.2–1)
WBC # BLD: 8.87 K/UL — SIGNIFICANT CHANGE UP (ref 3.8–10.5)
WBC # FLD AUTO: 8.87 K/UL — SIGNIFICANT CHANGE UP (ref 3.8–10.5)
WBC UR QL: 114 /HPF — HIGH (ref 0–5)

## 2025-04-30 PROCEDURE — 86900 BLOOD TYPING SEROLOGIC ABO: CPT

## 2025-04-30 PROCEDURE — 86901 BLOOD TYPING SEROLOGIC RH(D): CPT

## 2025-04-30 PROCEDURE — G0463: CPT

## 2025-04-30 PROCEDURE — 71046 X-RAY EXAM CHEST 2 VIEWS: CPT | Mod: 26

## 2025-04-30 PROCEDURE — 87186 SC STD MICRODIL/AGAR DIL: CPT

## 2025-04-30 PROCEDURE — 83036 HEMOGLOBIN GLYCOSYLATED A1C: CPT

## 2025-04-30 PROCEDURE — 87086 URINE CULTURE/COLONY COUNT: CPT

## 2025-04-30 PROCEDURE — 36415 COLL VENOUS BLD VENIPUNCTURE: CPT

## 2025-04-30 PROCEDURE — 80048 BASIC METABOLIC PNL TOTAL CA: CPT

## 2025-04-30 PROCEDURE — 71046 X-RAY EXAM CHEST 2 VIEWS: CPT

## 2025-04-30 PROCEDURE — 86850 RBC ANTIBODY SCREEN: CPT

## 2025-04-30 PROCEDURE — 87077 CULTURE AEROBIC IDENTIFY: CPT

## 2025-04-30 PROCEDURE — 85610 PROTHROMBIN TIME: CPT

## 2025-04-30 PROCEDURE — 85025 COMPLETE CBC W/AUTO DIFF WBC: CPT

## 2025-04-30 PROCEDURE — 93005 ELECTROCARDIOGRAM TRACING: CPT

## 2025-04-30 PROCEDURE — 93010 ELECTROCARDIOGRAM REPORT: CPT

## 2025-04-30 PROCEDURE — 81001 URINALYSIS AUTO W/SCOPE: CPT

## 2025-04-30 PROCEDURE — 85730 THROMBOPLASTIN TIME PARTIAL: CPT

## 2025-04-30 RX ORDER — ACETAMINOPHEN 500 MG/5ML
975 LIQUID (ML) ORAL ONCE
Refills: 0 | Status: DISCONTINUED | OUTPATIENT
Start: 2025-04-30 | End: 2025-04-30

## 2025-04-30 RX ORDER — CEFAZOLIN SODIUM IN 0.9 % NACL 3 G/100 ML
2000 INTRAVENOUS SOLUTION, PIGGYBACK (ML) INTRAVENOUS ONCE
Refills: 0 | Status: DISCONTINUED | OUTPATIENT
Start: 2025-04-30 | End: 2025-04-30

## 2025-04-30 RX ORDER — ACETAMINOPHEN 500 MG/5ML
975 LIQUID (ML) ORAL ONCE
Refills: 0 | Status: COMPLETED | OUTPATIENT
Start: 2025-05-20 | End: 2025-05-20

## 2025-04-30 RX ORDER — CEFAZOLIN SODIUM IN 0.9 % NACL 3 G/100 ML
2000 INTRAVENOUS SOLUTION, PIGGYBACK (ML) INTRAVENOUS ONCE
Refills: 0 | Status: DISCONTINUED | OUTPATIENT
Start: 2025-05-20 | End: 2025-05-20

## 2025-04-30 NOTE — H&P PST ADULT - BLOOD AVOIDANCE/RESTRICTIONS, PROFILE
Reports he simply does not want any blood transfusion. "blood and non blood product preferences" form was given to pt. and he will bring it DOS.

## 2025-04-30 NOTE — H&P PST ADULT - BP NONINVASIVE DIASTOLIC (MM HG)
Patient withdrawn to room. Patient paranoid. Patient initially refusing liquid medications this morning , but with encouragement patient took scheduled Prozac but refused liquid multivitamin. Patient took all other medications as scheduled this morning.    70

## 2025-04-30 NOTE — H&P PST ADULT - PROBLEM SELECTOR PLAN 5
buspirone as prescribed Caprini - 6 moderate risk   Surgical team please assess/recommend mechanical/pharmacological measures for VTE prophylaxis

## 2025-04-30 NOTE — H&P PST ADULT - RESPIRATORY COMMENTS
2/28/25 hemoptysis and cough left upper opacity noted on ct scan in [place] ER on antibiotic and prednisone  feeling well today denies cough or dyspnea  lungs clear

## 2025-04-30 NOTE — H&P PST ADULT - ASSESSMENT
70 yo M presents to PST with PMH of cerebral aneurysm s/p clipping 2024, hand tremors, headaches, BPH, HTN, bipolar disorder, mitral valve regurgitation, depression, anxiety, COPD, cervical radiculopathy  enlarged prostate  and recent hemoptysis with chest CT showing  left upper opacity ( 25, on antibiotic and steroids)  Pt presents to [facility] [place] today with Marissa his  ,  Pt. reports hx of urinary retention  since 2024 and went to [facility] . Currently with urinary catheter draining yellow urine. Cystoscopy performed by Dr. Tejeda- enlarged obstructing prostate noted . Pt was Scheduled for laser enucleation of the prostate on 10/8/2024 but states surgeon cancelled , now presents or surgery on 3/21/25. medical and cardiac clearances . Neuro note requested by surgeon.  To repeat CXR prop s/w yosef at DR. Tejeda office and email sent regarding recent URI. propranolol  and buspirone as prescribed.   OPIOID RISK TOOL    SHAY EACH BOX THAT APPLIES AND ADD TOTALS AT THE END    FAMILY HISTORY OF SUBSTANCE ABUSE                 FEMALE         MALE                                                Alcohol                             [  ]1 pt          [  ]3pts                                               Illegal Durgs                     [  ]2 pts        [  ]3pts                                               Rx Drugs                           [  ]4 pts        [  ]4 pts    PERSONAL HISTORY OF SUBSTANCE ABUSE                                                                                          Alcohol                             [  ]3 pts       [  ]3 pts                                               Illegal Durgs                     [  ]4 pts        [  ]4 pts                                               Rx Drugs                           [  ]5 pts        [  ]5 pts    AGE BETWEEN 16-45 YEARS                                      [  ]1 pt         [  ]1 pt    HISTORY OF PREADOLESCENT   SEXUAL ABUSE                                                             [  ]3 pts        [  ]0pts    PSYCHOLOGICAL DISEASE                     ADD, OCD, Bipolar, Schizophrenia        [  ]2 pts         [ X ]2 pts                      Depression                                               [  ]1 pt           [  ]1 pt           SCORING TOTAL   (add numbers and type here)              (*2**)                                     A score of 3 or lower indicated LOW risk for future opiod abuse  A score of 4 to 7 indicated moderate risk for future opiod abuse  A score of 8 or higher indicates a high risk for opiod abuse  ?  CAPRINI SCORE    AGE RELATED RISK FACTORS                                                             [ ] Age 41-60 years                                            (1 Point)  [X ] Age: 61-74 years                                           (2 Points)                 [ ] Age= 75 years                                                (3 Points)             DISEASE RELATED RISK FACTORS                                                       [ ] Edema in the lower extremities                 (1 Point)                     [ ] Varicose veins                                               (1 Point)                                 [X ] BMI > 25 Kg/m2                                            (1 Point)                                  [ ] Serious infection (ie PNA)                            (1 Point)                     [X ] Lung disease ( COPD, Emphysema)            (1 Point)                                                                          [ ] Acute myocardial infarction                         (1 Point)                  [ ] Congestive heart failure (in the previous month)  (1 Point)         [ ] Inflammatory bowel disease                            (1 Point)                  [ ] Central venous access, PICC or Port               (2 points)       (within the last month)                                                                [ ] Stroke (in the previous month)                        (5 Points)    [ ] Previous or present malignancy                       (2 points)                                                                                                                                                         HEMATOLOGY RELATED FACTORS                                                         [ ] Prior episodes of VTE                                     (3 Points)                     [ ] Positive family history for VTE                      (3 Points)                  [ ] Prothrombin 23002 A                                     (3 Points)                     [ ] Factor V Leiden                                                (3 Points)                        [ ] Lupus anticoagulants                                      (3 Points)                                                           [ ] Anticardiolipin antibodies                              (3 Points)                                                       [ ] High homocysteine in the blood                   (3 Points)                                             [ ] Other congenital or acquired thrombophilia      (3 Points)                                                [ ] Heparin induced thrombocytopenia                  (3 Points)                                        MOBILITY RELATED FACTORS  [ ] Bed rest                                                         (1 Point)  [ ] Plaster cast                                                    (2 points)  [ ] Bed bound for more than 72 hours           (2 Points)    GENDER SPECIFIC FACTORS  [ ] Pregnancy or had a baby within the last month   (1 Point)  [ ] Post-partum < 6 weeks                                   (1 Point)  [ ] Hormonal therapy  or oral contraception   (1 Point)  [ ] History of pregnancy complications              (1 point)  [ ] Unexplained or recurrent              (1 Point)    OTHER RISK FACTORS                                           (1 Point)  [X ] BMI >40, smoking, diabetes requiring insulin, chemotherapy  blood transfusions and length of surgery over 2 hours    SURGERY RELATED RISK FACTORS  [ ]  Section within the last month     (1 Point)  [ ] Minor surgery                                                  (1 Point)  [ ] Arthroscopic surgery                                       (2 Points)  [X ] Planned major surgery lasting more            (2 Points)      than 45 minutes     [ ] Elective hip or knee joint replacement       (5 points)       surgery                                                TRAUMA RELATED RISK FACTORS  [ ] Fracture of the hip, pelvis, or leg                       (5 Points)  [ ] Spinal cord injury resulting in paralysis             (5 points)       (in the previous month)    [ ] Paralysis  (less than 1 month)                             (5 Points)  [ ] Multiple Trauma within 1 month                        (5 Points)    Total Score [    7    ]    Caprini Score 0-2: Low Risk, NO VTE prophylaxis required for most patients, encourage ambulation  Caprini Score 3-6: Moderate Risk , pharmacologic VTE prophylaxis is indicated for most patients (in the absence of contraindications)  Caprini Score Greater than or =7: High risk, pharmocologic VTE prophylaxis indicated for most patients (in the absence of contraindications)                                 68 yo M with PMH Leaky heart valve, bipolar disorder, ADHD, depression, cerebral aneurysm s/p  R pericallosal aneurysm clipping 24, L cavernous ICA aneurysm 5x6 mm (being monitored), COPD now scheduled for laser enucleation of the prostate on 2025.       -Medical evaluation pending  -Cardiac evaluation pending  -Neurology evaluation pending  - Educated on ERP protocol   -Educated on NSAIDS, multivitamins and herbals that increase the risk of bleeding and need to be stopped 5 days before procedure  -Educated on infection prevention  -Tylenol can be taken if needed for pain  -Will continue all other medications as prescribed  -Verbalized understanding of all instructions.        OPIOID RISK TOOL    SHAY EACH BOX THAT APPLIES AND ADD TOTALS AT THE END    FAMILY HISTORY OF SUBSTANCE ABUSE                 FEMALE         MALE                                                Alcohol                             [  ]1 pt          [  ]3pts                                               Illegal Durgs                     [  ]2 pts        [  ]3pts                                               Rx Drugs                           [  ]4 pts        [  ]4 pts    PERSONAL HISTORY OF SUBSTANCE ABUSE                                                                                          Alcohol                             [  ]3 pts       [  ]3 pts                                               Illegal Durgs                     [  ]4 pts        [  ]4 pts                                               Rx Drugs                           [  ]5 pts        [  ]5 pts    AGE BETWEEN 16-45 YEARS                                      [  ]1 pt         [  ]1 pt    HISTORY OF PREADOLESCENT   SEXUAL ABUSE                                                             [  ]3 pts        [  ]0pts    PSYCHOLOGICAL DISEASE                     ADD, OCD, Bipolar, Schizophrenia        [  ]2 pts         [ X ]2 pts                      Depression                                               [  ]1 pt           [ x ]1 pt           SCORING TOTAL   (add numbers and type here)              (*3**)                                     A score of 3 or lower indicated LOW risk for future opiod abuse  A score of 4 to 7 indicated moderate risk for future opiod abuse  A score of 8 or higher indicates a high risk for opiod abuse  ?  CAPRINI SCORE    AGE RELATED RISK FACTORS                                                             [ ] Age 41-60 years                                            (1 Point)  [X ] Age: 61-74 years                                           (2 Points)                 [ ] Age= 75 years                                                (3 Points)             DISEASE RELATED RISK FACTORS                                                       [ ] Edema in the lower extremities                 (1 Point)                     [ ] Varicose veins                                               (1 Point)                                 [] BMI > 25 Kg/m2                                            (1 Point)                                  [ ] Serious infection (ie PNA)                            (1 Point)                     [X ] Lung disease ( COPD, Emphysema)            (1 Point)                                                                          [ ] Acute myocardial infarction                         (1 Point)                  [ ] Congestive heart failure (in the previous month)  (1 Point)         [ ] Inflammatory bowel disease                            (1 Point)                  [ ] Central venous access, PICC or Port               (2 points)       (within the last month)                                                                [ ] Stroke (in the previous month)                        (5 Points)    [ ] Previous or present malignancy                       (2 points)                                                                                                                                                         HEMATOLOGY RELATED FACTORS                                                         [ ] Prior episodes of VTE                                     (3 Points)                     [ ] Positive family history for VTE                      (3 Points)                  [ ] Prothrombin 82917 A                                     (3 Points)                     [ ] Factor V Leiden                                                (3 Points)                        [ ] Lupus anticoagulants                                      (3 Points)                                                           [ ] Anticardiolipin antibodies                              (3 Points)                                                       [ ] High homocysteine in the blood                   (3 Points)                                             [ ] Other congenital or acquired thrombophilia      (3 Points)                                                [ ] Heparin induced thrombocytopenia                  (3 Points)                                        MOBILITY RELATED FACTORS  [ ] Bed rest                                                         (1 Point)  [ ] Plaster cast                                                    (2 points)  [ ] Bed bound for more than 72 hours           (2 Points)    GENDER SPECIFIC FACTORS  [ ] Pregnancy or had a baby within the last month   (1 Point)  [ ] Post-partum < 6 weeks                                   (1 Point)  [ ] Hormonal therapy  or oral contraception   (1 Point)  [ ] History of pregnancy complications              (1 point)  [ ] Unexplained or recurrent              (1 Point)    OTHER RISK FACTORS                                           (1 Point)  [X ] BMI >40, smoking, diabetes requiring insulin, chemotherapy  blood transfusions and length of surgery over 2 hours    SURGERY RELATED RISK FACTORS  [ ]  Section within the last month     (1 Point)  [ ] Minor surgery                                                  (1 Point)  [ ] Arthroscopic surgery                                       (2 Points)  [X ] Planned major surgery lasting more            (2 Points)      than 45 minutes     [ ] Elective hip or knee joint replacement       (5 points)       surgery                                                TRAUMA RELATED RISK FACTORS  [ ] Fracture of the hip, pelvis, or leg                       (5 Points)  [ ] Spinal cord injury resulting in paralysis             (5 points)       (in the previous month)    [ ] Paralysis  (less than 1 month)                             (5 Points)  [ ] Multiple Trauma within 1 month                        (5 Points)    Total Score [    6   ]    Caprini Score 0-2: Low Risk, NO VTE prophylaxis required for most patients, encourage ambulation  Caprini Score 3-6: Moderate Risk , pharmacologic VTE prophylaxis is indicated for most patients (in the absence of contraindications)  Caprini Score Greater than or =7: High risk, pharmocologic VTE prophylaxis indicated for most patients (in the absence of contraindications)

## 2025-04-30 NOTE — H&P PST ADULT - NSICDXFAMILYHX_GEN_ALL_CORE_FT
FAMILY HISTORY:  Father  Still living? No  FH: emphysema, Age at diagnosis: Age Unknown    Mother  Still living? No  FH: throat cancer, Age at diagnosis: Age Unknown    Sibling  Still living? Unknown  FH: heart disease, Age at diagnosis: Age Unknown

## 2025-04-30 NOTE — H&P PST ADULT - PROBLEM SELECTOR PLAN 4
medical and cardiac clearances . Neuro note requested by surgeon.    propranolol as prescribed s/p  R pericallosal aneurysm clipping 4/12/24, L cavernous ICA aneurysm 5x6 mm (being monitored per pt.)  Neuro evaluation

## 2025-04-30 NOTE — H&P PST ADULT - PSYCHIATRIC COMMENTS
bipolar hx bipolar pleasant and cooperative at pst hx bipolar disorder- Pt initially walked out in anger during interview saying " I'm out" returned to PST 5 minutes later pleasant and cooperative.

## 2025-04-30 NOTE — H&P PST ADULT - HISTORY OF PRESENT ILLNESS
70 yo M for initial consultation  active smoker  no FH of prostate cancer  not sure regarding PSA testing  ?  knows he had LUTS and had some procedure for the prostate in the past  known he was doing well since  last follow up with urology before 2019  ?  recently had neurosurgery for brain aneurysm  after had retention and gonzalez inserted, since with gonzalez  was discharged with tamsulosin 0.4 mg since         Mr. Stephenson is a 68 year old R-hand dominant male, current smoker, who presents for follow up of R pericallosal aneurysm clipping 4/12/24. He had incidental cerebral aneurysms recently discovered on imaging by neurology due to hand tremors on MRA 2/2023 which showed laterally directed aneurysms arising from the bilateral proximal cavernous segment ICAs measuring up to 5 mm on the left and 3 mm on the right. Patient underwent diagnostic cerebral angiogram which confirmed R A2 aneurysm 2.4mm and L cavernous ICA aneurysm 5x6 mm. He underwent R pericallosal aneurysm clipping on 4/12/24, intraop angiogram showed no residual aneurysm. Patient was discharged to home with no needs on 4/15/24. He presents today for follow up. He reports that he has been experiencing headaches with stabbing pain to the L eye and neck. Headaches are inconsistent, does not correlate with time of day or activity. Otherwise he denies weakness, numbness, tingling, difficulty walking, difficulty speaking, dizziness. 70 yo M with PMH Leaky heart valve, bipolar disorder, ADHD, depression, cerebral aneurysm s/p  R pericallosal aneurysm clipping 4/12/24, L cavernous ICA aneurysm 5x6 mmCOPD, presents to [facility] today. Pt. reports retention of urine 5 years ago and has had a urinary catheter since then. Reports that catheter was dislodged 3/2025 and was replaced at [facility] 4/2025. Denies any hematuria, abdominal pain. Denies any bowel issues. C/o frequent UTI's and has been treated with antibiotics.         for initial consultation  active smoker  no FH of prostate cancer  not sure regarding PSA testing  ?  knows he had LUTS and had some procedure for the prostate in the past  known he was doing well since  last follow up with urology before 2019  ?  recently had neurosurgery for brain aneurysm  after had retention and gonzalez inserted, since with gonzalez  was discharged with tamsulosin 0.4 mg since         Mr. Stephenson is a 68 year old R-hand dominant male, current smoker, who presents for follow up of R pericallosal aneurysm clipping 4/12/24. He had incidental cerebral aneurysms recently discovered on imaging by neurology due to hand tremors on MRA 2/2023 which showed laterally directed aneurysms arising from the bilateral proximal cavernous segment ICAs measuring up to 5 mm on the left and 3 mm on the right. Patient underwent diagnostic cerebral angiogram which confirmed R A2 aneurysm 2.4mm and L cavernous ICA aneurysm 5x6 mm. He underwent R pericallosal aneurysm clipping on 4/12/24, intraop angiogram showed no residual aneurysm. Patient was discharged to home with no needs on 4/15/24. He presents today for follow up. He reports that he has been experiencing headaches with stabbing pain to the L eye and neck. Headaches are inconsistent, does not correlate with time of day or activity. Otherwise he denies weakness, numbness, tingling, difficulty walking, difficulty speaking, dizziness. 70 yo M with PMH Leaky heart valve, BPH, bipolar disorder, ADHD, depression, cerebral aneurysm s/p  R pericallosal aneurysm clipping 4/12/24, L cavernous ICA aneurysm 5x6 mm (being monitored), COPD, presents to PST today. Pt. reports retention of urine 5 years ago and has had a urinary catheter since then. Reports that catheter was dislodged 3/2025 and was replaced at [facility] 4/2025. Denies any hematuria, abdominal pain. Denies any bowel issues. C/o frequent UTI's and has been treated with antibiotics. Scheduled for laser enucleation of the prostate on 5/20/2025.

## 2025-04-30 NOTE — H&P PST ADULT - NS HP PST LATEX ALLERGY
Date: 2021     Presenting Information:   Haroon Dangelo is a 3-month-old male who is seen for a billable genetic counseling telephone encounter to discuss the recommended genetic testing for Smith-Lemli-Opitz syndrome (SLOS).     Haroon had a recent admission at [facility] from 2021 - 2021 for ongoing management of  abstinence syndrome, oral aversion, feeding intolerance, G-tube dependence, and thrombocytopenia (resolved).  Haroon' medical history also includes microcephaly, 2,3 syndactyly, facial dysmorphism (midface hypoplasia, upturned nose, microretrognathia, prominent philtrum, thick upper lip), bifid uvula, and small anterior fontanelle.  Haroon was seen by Dr. Leach who recommended chromosome microarray analysis (normal), as well as a SLOS biochemical screen.  The SLOS screen revealed mild elevations of 7-dehydrocholesterol and 8-dehydrocholesterol, and a repeat screen yielded similar results.  In order to further clarify whether SLOS could be a diagnosis for Ervin, analysis of the DHCR7 gene is recommended.    I spoke with Haroon' adoptive mother, Cyndie, today to further discuss this recommended genetic testing and to obtain informed consent.    Medical History:    Microcephaly    Syndactyly    Feeding intolerance     abstinence syndrome     Pertinent imaging:     Renal US, 2021: IMPRESSION: 1) Normal grayscale and Doppler evaluation of the kidneys. 2) Trace nonspecific free fluid.      Head US, 2021: Impression: No acute intracranial pathology.      ECHO, 2021: Normal infant echocardiogram. There is normal appearance and motion of the tricuspid, mitral, pulmonary and aortic valves. There is a patent foramen ovale with left to right flow. There is no patent ductus arteriosus. The left and right ventricles have normal chamber size, wall thickness, and systolic function.  The calculated biplane left ventricular ejection fraction is 65 %.      Brain MRI,  2021: Normal     Prior Genetic Labs:    Microarray, 2021, [facility]: Normal    Family History:     Although Haroon is adopted, Cyndie is in close contact with his biological mother.  Cyndie has some family history information about the maternal side of the family.      Haroon is the only child between the union of his biological parents.  He has a maternal half-sister who is healthy but is said to have mild learning disabilities.      Maternal family history: Haroon' biological mother is 32 years of age and reported to be in good health.  She has one brother who is healthy, and this brother has a healthy daughter.      Paternal family history: Haroon' biological father is known to have mental health issues and asthma.  There is no additional information about the paternal side of the family.      Haroon' maternal ancestry is  (Arabic).  His paternal ancestry is .  Consanguinity is unlikely.     Genetics:  We briefly reviewed that our genetic information (DNA) directs all aspects of our bodies' growth and development.  This information is encoded in individual units called genes.  Genes are packaged up into structures called chromosomes.  Our genes and chromosomes come in pairs; one copy comes from our mother and one copy comes from our father.       We briefly reviewed features of Smith-Lemli-Opitz syndrome which are highly variable and can include microcephaly, distinctive facial features, various malformations (heart, genitalia, kidneys, GI tract, lungs), cognitive disability, behavioral problems, syndactyly or polydactyly, feeding difficulties, and hypotonia.  SLOS is caused by mutations in the DHCR7 gene.  The DHCR7 gene provides the instructions for making the enzyme 7-dehydrocholesterol reductase, which is involved in the production of cholesterol.  When the DHCR7 gene does not work properly (due to gene mutations), this reduces or eliminates the activity of the 7-dehydrocholesterol reductase,  which prevents cells from producing enough cholesterol.  This leads to the toxic buildup of cholesterol byproducts in the blood and throughout the body, which in turn leads to the signs and symptoms of the disorder.    SLOS is inherited in an autosomal recessive manner.  This means that in order to have this condition, affected individuals must have a mutation in both copies of the gene (total of two mutations).  When both parents are carriers of a single gene mutation, every child between the couple has a 25% chance to inherit both mutations and be affected with the disorder.    Assessing the level of 7-dehydrocholesterol is a good initial screen for SLOS, as an elevated plasma concentration of 7-DHC is highly suggestive of a biochemical diagnosis of SLOS.  Follow up testing in this scenario involves molecular analysis of the DHCR7 gene to try to identify the disease-causing mutations.     Genetic Testing:  Genetic testing for Smith-Lemli-Opitz syndrome will involve analysis of the DHCR7 gene to determine if there are any errors in the sequence of the letters (misspellings), or if there are any missing or extra DNA letters.  These types of errors can disrupt a gene and cause it to not work properly.  We discussed possible results from genetic testing which can include:    1)  Positive - two mutations were identified in the DHCR7 gene.  A positive result would further support a diagnosis of SLOS, and would help to guide further medical management for Haroon.       2)  Negative/normal - no mutations were identified in the DHCR7 gene.  Genetic testing has limitations, and a negative result would not exclude a genetic cause for Haroon' features.      3)  Variant of uncertain significance (VUS) - a change in the DNA sequence of the DHCR7 gene was identified, but there is not enough information to determine if the genetic change is disease-causing or harmless.  It may be unclear whether the genetic change is contributing  to Haroon' features.  If a variant of uncertain significance is identified, testing of biological parents (if available) may be helpful to provide additional clarification.  In most cases, identification of a VUS does not result in any clinically actionable recommendations.     Genetic test results will influence ongoing management and surveillance for [place].  If a diagnosis of Smith-Lemli-Opitz syndrome is confirmed, this would provide information about future prognosis, including the need to screen for associated medical and/or developmental problems.  It is important for children with SLOS to be evaluated for the range of conditions associated with SLOS including eye, heart, musculoskeletal, genitourinary and gastrointestinal disorders.  Genetic results may also help to inform treatment options, help to identify appropriate support services, and help to clarify risks (medical and reproductive) for other family members.  For these reasons, this recommended genetic testing for Haroon is medically necessary.     Lab results may be automatically released via [facility].  Department protocol is to hold genetic testing results until we have reviewed them. We will then contact the family directly to disclose the results and ensure they receive a copy of the report. This protocol was reviewed with the family, who were in agreement to hold the results for genetics review and direct contact.        Plan / Summary:  1. Analysis of the DHCR7 gene associated with Dangelo-Lemli-Opitz syndrome is recommended for Haroon.  Cyndie provided verbal and written informed consent to proceed with this testing for [place].    2. We will initiate a prior authorization request.  If PA is approved, testing can be initiated and results should be available in about 4 weeks.       3. Further follow up will depend on Haroon' genetic test results.       4. Cyndie has my contact information and was encouraged to reach out with questions or concerns.          Janelle  MS Audrey, [facility]  Licensed Genetic Counselor  [facility], [place]  [phone]      Approximate Time Spent in Consultation: 15 minutes    No

## 2025-04-30 NOTE — H&P PST ADULT - OTHER CARE PROVIDERS
PCP Gil  Cardiac Kerr  neurologist Jimmie  PCP Gil  Cardiac Kerr  neurologist Jimmie . Pt's  Marissa

## 2025-04-30 NOTE — H&P PST ADULT - PROBLEM SELECTOR PLAN 2
To repeat CXR prop s/w yosef at DR. Tejeda office   continue prednisone and antibiotic  as prescribed   medical clearance hx bipolar disorder- Pt initially walked out in anger during interview saying " I'm out" returned to PST 5 minutes later pleasant and cooperative.  - continue prescribed medications

## 2025-04-30 NOTE — H&P PST ADULT - PROBLEM SELECTOR PLAN 1
Scheduled for laser enucleation of the prostate on 3/21/25    medical and cardiac clearances . Neuro note requested by surgeon.    To repeat CXR prop s/w yosef at DR. 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       -Medical evaluation pending  -Cardiac evaluation pending  -Neurology evaluation pending  - Educated on ERP protocol   -Educated on NSAIDS, multivitamins and herbals that increase the risk of bleeding and need to be stopped 5 days before procedure  -Educated on infection prevention  -Tylenol can be taken if needed for pain  -Will continue all other medications as prescribed  -Verbalized understanding of all instructions.

## 2025-04-30 NOTE — H&P PST ADULT - NEUROLOGICAL COMMENTS
hx aneurysm repair 2023  occ headaches / hand tremors and I unrepaired fine hand tremors noted hx aneurysm repair,  occ headaches / hand tremors and I unrepaired aneurysm

## 2025-04-30 NOTE — H&P PST ADULT - NSHP PST DIAGOTHER LIST_GEN_A_CORE
Abnormal lab results sent to Dr. Tejeda, Rodo Bradley NP and Coni Camacho PA. Pt. is pending medical, cardiac and neurology evaluation

## 2025-05-08 ENCOUNTER — NON-APPOINTMENT (OUTPATIENT)
Age: 70
End: 2025-05-08

## 2025-05-08 DIAGNOSIS — N39.0 URINARY TRACT INFECTION, SITE NOT SPECIFIED: ICD-10-CM

## 2025-05-08 RX ORDER — SULFAMETHOXAZOLE AND TRIMETHOPRIM 800; 160 MG/1; MG/1
800-160 TABLET ORAL TWICE DAILY
Qty: 10 | Refills: 0 | Status: ACTIVE | COMMUNITY
Start: 2025-05-08 | End: 1900-01-01

## 2025-05-19 NOTE — PATIENT PROFILE ADULT - NSPROPTRIGHTNOTIFY_GEN_A_NUR
Dr. Nelida Lomax patient is requesting:. Pharmacy requesting refill of Amitriptyline 50mg. Medication active on med list yes      Date of last fill: 05/23/2022  verified on 8/23/2022   verified by [address] [place] [address]      Date of last appointment 02/11/2022    Next Visit Date:  10/11/2022      Please consider filling as he is on vacation. declines

## 2025-05-20 ENCOUNTER — RESULT REVIEW (OUTPATIENT)
Age: 70
End: 2025-05-20

## 2025-05-20 ENCOUNTER — APPOINTMENT (OUTPATIENT)
Dept: UROLOGY | Facility: HOSPITAL | Age: 70
End: 2025-05-20

## 2025-05-20 ENCOUNTER — INPATIENT (INPATIENT)
Facility: HOSPITAL | Age: 70
LOS: 1 days | Discharge: HOME | DRG: 726 | End: 2025-05-22
Attending: STUDENT IN AN ORGANIZED HEALTH CARE EDUCATION/TRAINING PROGRAM | Admitting: STUDENT IN AN ORGANIZED HEALTH CARE EDUCATION/TRAINING PROGRAM
Payer: MEDICARE

## 2025-05-20 VITALS
SYSTOLIC BLOOD PRESSURE: 122 MMHG | HEART RATE: 63 BPM | TEMPERATURE: 97 F | OXYGEN SATURATION: 99 % | DIASTOLIC BLOOD PRESSURE: 61 MMHG | WEIGHT: 127.87 LBS | HEIGHT: 70 IN | RESPIRATION RATE: 16 BRPM

## 2025-05-20 DIAGNOSIS — R33.9 RETENTION OF URINE, UNSPECIFIED: ICD-10-CM

## 2025-05-20 DIAGNOSIS — Z98.890 OTHER SPECIFIED POSTPROCEDURAL STATES: Chronic | ICD-10-CM

## 2025-05-20 DIAGNOSIS — N40.1 BENIGN PROSTATIC HYPERPLASIA WITH LOWER URINARY TRACT SYMPTOMS: ICD-10-CM

## 2025-05-20 LAB
ANION GAP SERPL CALC-SCNC: 13 MMOL/L — SIGNIFICANT CHANGE UP (ref 5–17)
BUN SERPL-MCNC: 19.9 MG/DL — SIGNIFICANT CHANGE UP (ref 8–20)
CALCIUM SERPL-MCNC: 8.5 MG/DL — SIGNIFICANT CHANGE UP (ref 8.4–10.5)
CHLORIDE SERPL-SCNC: 104 MMOL/L — SIGNIFICANT CHANGE UP (ref 96–108)
CO2 SERPL-SCNC: 21 MMOL/L — LOW (ref 22–29)
CREAT SERPL-MCNC: 1.37 MG/DL — HIGH (ref 0.5–1.3)
EGFR: 56 ML/MIN/1.73M2 — LOW
EGFR: 56 ML/MIN/1.73M2 — LOW
GLUCOSE SERPL-MCNC: 105 MG/DL — HIGH (ref 70–99)
HCT VFR BLD CALC: 40.5 % — SIGNIFICANT CHANGE UP (ref 39–50)
HGB BLD-MCNC: 13.1 G/DL — SIGNIFICANT CHANGE UP (ref 13–17)
MCHC RBC-ENTMCNC: 28.8 PG — SIGNIFICANT CHANGE UP (ref 27–34)
MCHC RBC-ENTMCNC: 32.3 G/DL — SIGNIFICANT CHANGE UP (ref 32–36)
MCV RBC AUTO: 89 FL — SIGNIFICANT CHANGE UP (ref 80–100)
NRBC # BLD AUTO: 0 K/UL — SIGNIFICANT CHANGE UP (ref 0–0)
NRBC # FLD: 0 K/UL — SIGNIFICANT CHANGE UP (ref 0–0)
NRBC BLD AUTO-RTO: 0 /100 WBCS — SIGNIFICANT CHANGE UP (ref 0–0)
PLATELET # BLD AUTO: 340 K/UL — SIGNIFICANT CHANGE UP (ref 150–400)
PMV BLD: 8.6 FL — SIGNIFICANT CHANGE UP (ref 7–13)
POTASSIUM SERPL-MCNC: 4.8 MMOL/L — SIGNIFICANT CHANGE UP (ref 3.5–5.3)
POTASSIUM SERPL-SCNC: 4.8 MMOL/L — SIGNIFICANT CHANGE UP (ref 3.5–5.3)
RBC # BLD: 4.55 M/UL — SIGNIFICANT CHANGE UP (ref 4.2–5.8)
RBC # FLD: 14.4 % — SIGNIFICANT CHANGE UP (ref 10.3–14.5)
SODIUM SERPL-SCNC: 138 MMOL/L — SIGNIFICANT CHANGE UP (ref 135–145)
WBC # BLD: 16.33 K/UL — HIGH (ref 3.8–10.5)
WBC # FLD AUTO: 16.33 K/UL — HIGH (ref 3.8–10.5)

## 2025-05-20 PROCEDURE — 88305 TISSUE EXAM BY PATHOLOGIST: CPT | Mod: 26

## 2025-05-20 PROCEDURE — 88300 SURGICAL PATH GROSS: CPT | Mod: 26,59

## 2025-05-20 DEVICE — LASER FIBER SOLTIVE 550: Type: IMPLANTABLE DEVICE | Status: FUNCTIONAL

## 2025-05-20 DEVICE — MOCELLATOR ROTATION SINGLEUSE 4.75: Type: IMPLANTABLE DEVICE | Status: FUNCTIONAL

## 2025-05-20 RX ORDER — HEPARIN SODIUM 1000 [USP'U]/ML
5000 INJECTION INTRAVENOUS; SUBCUTANEOUS EVERY 8 HOURS
Refills: 0 | Status: DISCONTINUED | OUTPATIENT
Start: 2025-05-20 | End: 2025-05-22

## 2025-05-20 RX ORDER — ACETAMINOPHEN 500 MG/5ML
650 LIQUID (ML) ORAL EVERY 6 HOURS
Refills: 0 | Status: DISCONTINUED | OUTPATIENT
Start: 2025-05-20 | End: 2025-05-22

## 2025-05-20 RX ORDER — ROPINIROLE 5 MG/1
0.25 TABLET, FILM COATED ORAL
Refills: 0 | Status: DISCONTINUED | OUTPATIENT
Start: 2025-05-20 | End: 2025-05-22

## 2025-05-20 RX ORDER — CEFAZOLIN SODIUM IN 0.9 % NACL 3 G/100 ML
2000 INTRAVENOUS SOLUTION, PIGGYBACK (ML) INTRAVENOUS EVERY 8 HOURS
Refills: 0 | Status: DISCONTINUED | OUTPATIENT
Start: 2025-05-20 | End: 2025-05-20

## 2025-05-20 RX ORDER — CEFTRIAXONE 500 MG/1
1000 INJECTION, POWDER, FOR SOLUTION INTRAMUSCULAR; INTRAVENOUS EVERY 24 HOURS
Refills: 0 | Status: DISCONTINUED | OUTPATIENT
Start: 2025-05-20 | End: 2025-05-22

## 2025-05-20 RX ORDER — FUROSEMIDE 10 MG/ML
10 INJECTION INTRAMUSCULAR; INTRAVENOUS DAILY
Refills: 0 | Status: COMPLETED | OUTPATIENT
Start: 2025-05-21 | End: 2025-05-21

## 2025-05-20 RX ORDER — LAMOTRIGINE 150 MG/1
100 TABLET ORAL DAILY
Refills: 0 | Status: DISCONTINUED | OUTPATIENT
Start: 2025-05-20 | End: 2025-05-22

## 2025-05-20 RX ORDER — ONDANSETRON HCL/PF 4 MG/2 ML
4 VIAL (ML) INJECTION ONCE
Refills: 0 | Status: DISCONTINUED | OUTPATIENT
Start: 2025-05-20 | End: 2025-05-20

## 2025-05-20 RX ORDER — ONDANSETRON HCL/PF 4 MG/2 ML
4 VIAL (ML) INJECTION ONCE
Refills: 0 | Status: COMPLETED | OUTPATIENT
Start: 2025-05-20 | End: 2025-05-22

## 2025-05-20 RX ORDER — HYDROMORPHONE/SOD CHLOR,ISO/PF 2 MG/10 ML
0.5 SYRINGE (ML) INJECTION
Refills: 0 | Status: DISCONTINUED | OUTPATIENT
Start: 2025-05-20 | End: 2025-05-20

## 2025-05-20 RX ORDER — SENNA 187 MG
1 TABLET ORAL AT BEDTIME
Refills: 0 | Status: DISCONTINUED | OUTPATIENT
Start: 2025-05-20 | End: 2025-05-22

## 2025-05-20 RX ORDER — FENTANYL CITRATE-0.9 % NACL/PF 100MCG/2ML
25 SYRINGE (ML) INTRAVENOUS
Refills: 0 | Status: DISCONTINUED | OUTPATIENT
Start: 2025-05-20 | End: 2025-05-20

## 2025-05-20 RX ORDER — ALBUTEROL SULFATE 2.5 MG/3ML
2 VIAL, NEBULIZER (ML) INHALATION EVERY 6 HOURS
Refills: 0 | Status: DISCONTINUED | OUTPATIENT
Start: 2025-05-20 | End: 2025-05-22

## 2025-05-20 RX ORDER — BUSPIRONE HYDROCHLORIDE 15 MG/1
15 TABLET ORAL THREE TIMES A DAY
Refills: 0 | Status: DISCONTINUED | OUTPATIENT
Start: 2025-05-20 | End: 2025-05-22

## 2025-05-20 RX ORDER — SODIUM CHLORIDE 9 G/1000ML
1000 INJECTION, SOLUTION INTRAVENOUS
Refills: 0 | Status: DISCONTINUED | OUTPATIENT
Start: 2025-05-20 | End: 2025-05-20

## 2025-05-20 RX ORDER — SODIUM CHLORIDE 9 G/1000ML
1000 INJECTION, SOLUTION INTRAVENOUS
Refills: 0 | Status: DISCONTINUED | OUTPATIENT
Start: 2025-05-20 | End: 2025-05-21

## 2025-05-20 RX ADMIN — Medication 1 TABLET(S): at 21:41

## 2025-05-20 RX ADMIN — SODIUM CHLORIDE 75 MILLILITER(S): 9 INJECTION, SOLUTION INTRAVENOUS at 17:24

## 2025-05-20 RX ADMIN — Medication 975 MILLIGRAM(S): at 09:46

## 2025-05-20 RX ADMIN — LAMOTRIGINE 100 MILLIGRAM(S): 150 TABLET ORAL at 17:51

## 2025-05-20 RX ADMIN — CEFTRIAXONE 1000 MILLIGRAM(S): 500 INJECTION, POWDER, FOR SOLUTION INTRAMUSCULAR; INTRAVENOUS at 18:20

## 2025-05-20 RX ADMIN — HEPARIN SODIUM 5000 UNIT(S): 1000 INJECTION INTRAVENOUS; SUBCUTANEOUS at 21:41

## 2025-05-20 RX ADMIN — HEPARIN SODIUM 5000 UNIT(S): 1000 INJECTION INTRAVENOUS; SUBCUTANEOUS at 17:24

## 2025-05-20 RX ADMIN — ROPINIROLE 0.25 MILLIGRAM(S): 5 TABLET, FILM COATED ORAL at 17:51

## 2025-05-20 RX ADMIN — BUSPIRONE HYDROCHLORIDE 15 MILLIGRAM(S): 15 TABLET ORAL at 21:42

## 2025-05-20 NOTE — ASU PREOP CHECKLIST - AS BP NONINV METHOD
Detail Level: Simple Additional Notes: Patient consent was obtained to proceed with the visit and recommended plan of care after discussion of all risks and benefits, including the risks of COVID-19 exposure. Render Risk Assessment In Note?: yes electronic

## 2025-05-20 NOTE — CHART NOTE - NSCHARTNOTEFT_GEN_A_CORE
Post-op Check    Subjective:  Pt offers no acute complaints at this time. Pain well controlled on current regiment. Denies nausea, vomiting, chest pain, SOB, palpitations.     STATUS POST:  laser enucleation of the prostate  POST OPERATIVE DAY #: 0    MEDICATIONS  (STANDING):  busPIRone 15 milliGRAM(s) Oral three times a day  cefTRIAXone Injectable. 1000 milliGRAM(s) IV Push every 24 hours  heparin   Injectable 5000 Unit(s) SubCutaneous every 8 hours  lactated ringers. 1000 milliLiter(s) (75 mL/Hr) IV Continuous <Continuous>  lamoTRIgine 100 milliGRAM(s) Oral daily  ondansetron Injectable 4 milliGRAM(s) IV Push once  propranolol 10 milliGRAM(s) Oral two times a day  rOPINIRole 0.25 milliGRAM(s) Oral two times a day  senna 1 Tablet(s) Oral at bedtime  sodium chloride 0.9% lock flush 3 milliLiter(s) IV Push Once    MEDICATIONS  (PRN):  acetaminophen     Tablet .. 650 milliGRAM(s) Oral every 6 hours PRN Temp greater or equal to 38C (100.4F), Mild Pain (1 - 3)  albuterol    90 MICROgram(s) HFA Inhaler 2 Puff(s) Inhalation every 6 hours PRN for shortness of breath and/or wheezing    Vital Signs Last 24 Hrs  T(C): 36.7 (20 May 2025 20:02), Max: 36.7 (20 May 2025 14:00)  T(F): 98 (20 May 2025 20:02), Max: 98 (20 May 2025 14:00)  HR: 65 (20 May 2025 20:02) (54 - 65)  BP: 103/64 (20 May 2025 20:02) (90/57 - 131/63)  BP(mean): 69 (20 May 2025 14:00) (60 - 85)  RR: 16 (20 May 2025 20:02) (13 - 20)  SpO2: 93% (20 May 2025 20:02) (93% - 100%)  Parameters below as of 20 May 2025 20:02  Patient On (Oxygen Delivery Method): room air    Physical Exam:  General: Laying comfortably in bed, NAD  HEENT: PERRL, EOMI  Respiratory: no accessory muscle use, respirations non-labored  Neurological: A&O x 3; without gross deficit  : 3 way gonzalez in place, cbi running, urine light pink, draining well, no clots     A: Patient is a 71 yo M s/p laser enucleation of the prostate for BPH.     P:  cbi, irrigate prn, if remains clear, will clamp in AM   Continue current care  Pain control  OOB as tolerated  Encourage IS  DVT ppx

## 2025-05-21 LAB
ANION GAP SERPL CALC-SCNC: 15 MMOL/L — SIGNIFICANT CHANGE UP (ref 5–17)
ANION GAP SERPL CALC-SCNC: 16 MMOL/L — SIGNIFICANT CHANGE UP (ref 5–17)
BUN SERPL-MCNC: 21.6 MG/DL — HIGH (ref 8–20)
BUN SERPL-MCNC: 22.8 MG/DL — HIGH (ref 8–20)
CALCIUM SERPL-MCNC: 8.5 MG/DL — SIGNIFICANT CHANGE UP (ref 8.4–10.5)
CALCIUM SERPL-MCNC: 8.8 MG/DL — SIGNIFICANT CHANGE UP (ref 8.4–10.5)
CHLORIDE SERPL-SCNC: 101 MMOL/L — SIGNIFICANT CHANGE UP (ref 96–108)
CHLORIDE SERPL-SCNC: 99 MMOL/L — SIGNIFICANT CHANGE UP (ref 96–108)
CO2 SERPL-SCNC: 20 MMOL/L — LOW (ref 22–29)
CO2 SERPL-SCNC: 22 MMOL/L — SIGNIFICANT CHANGE UP (ref 22–29)
CREAT SERPL-MCNC: 1.34 MG/DL — HIGH (ref 0.5–1.3)
CREAT SERPL-MCNC: 1.49 MG/DL — HIGH (ref 0.5–1.3)
EGFR: 50 ML/MIN/1.73M2 — LOW
EGFR: 50 ML/MIN/1.73M2 — LOW
EGFR: 57 ML/MIN/1.73M2 — LOW
EGFR: 57 ML/MIN/1.73M2 — LOW
GLUCOSE SERPL-MCNC: 111 MG/DL — HIGH (ref 70–99)
GLUCOSE SERPL-MCNC: 90 MG/DL — SIGNIFICANT CHANGE UP (ref 70–99)
HCT VFR BLD CALC: 40.4 % — SIGNIFICANT CHANGE UP (ref 39–50)
HGB BLD-MCNC: 13.1 G/DL — SIGNIFICANT CHANGE UP (ref 13–17)
MCHC RBC-ENTMCNC: 28.8 PG — SIGNIFICANT CHANGE UP (ref 27–34)
MCHC RBC-ENTMCNC: 32.4 G/DL — SIGNIFICANT CHANGE UP (ref 32–36)
MCV RBC AUTO: 88.8 FL — SIGNIFICANT CHANGE UP (ref 80–100)
NRBC # BLD AUTO: 0 K/UL — SIGNIFICANT CHANGE UP (ref 0–0)
NRBC # FLD: 0 K/UL — SIGNIFICANT CHANGE UP (ref 0–0)
NRBC BLD AUTO-RTO: 0 /100 WBCS — SIGNIFICANT CHANGE UP (ref 0–0)
PLATELET # BLD AUTO: 376 K/UL — SIGNIFICANT CHANGE UP (ref 150–400)
PMV BLD: 9 FL — SIGNIFICANT CHANGE UP (ref 7–13)
POTASSIUM SERPL-MCNC: 4 MMOL/L — SIGNIFICANT CHANGE UP (ref 3.5–5.3)
POTASSIUM SERPL-MCNC: 4.1 MMOL/L — SIGNIFICANT CHANGE UP (ref 3.5–5.3)
POTASSIUM SERPL-SCNC: 4 MMOL/L — SIGNIFICANT CHANGE UP (ref 3.5–5.3)
POTASSIUM SERPL-SCNC: 4.1 MMOL/L — SIGNIFICANT CHANGE UP (ref 3.5–5.3)
RBC # BLD: 4.55 M/UL — SIGNIFICANT CHANGE UP (ref 4.2–5.8)
RBC # FLD: 14.4 % — SIGNIFICANT CHANGE UP (ref 10.3–14.5)
SODIUM SERPL-SCNC: 134 MMOL/L — LOW (ref 135–145)
SODIUM SERPL-SCNC: 137 MMOL/L — SIGNIFICANT CHANGE UP (ref 135–145)
WBC # BLD: 11.37 K/UL — HIGH (ref 3.8–10.5)
WBC # FLD AUTO: 11.37 K/UL — HIGH (ref 3.8–10.5)

## 2025-05-21 RX ORDER — LAMOTRIGINE 150 MG/1
1 TABLET ORAL
Refills: 0 | DISCHARGE

## 2025-05-21 RX ORDER — ROPINIROLE 5 MG/1
1 TABLET, FILM COATED ORAL
Refills: 0 | DISCHARGE

## 2025-05-21 RX ORDER — SODIUM CHLORIDE 9 G/1000ML
1000 INJECTION, SOLUTION INTRAVENOUS
Refills: 0 | Status: DISCONTINUED | OUTPATIENT
Start: 2025-05-21 | End: 2025-05-22

## 2025-05-21 RX ADMIN — HEPARIN SODIUM 5000 UNIT(S): 1000 INJECTION INTRAVENOUS; SUBCUTANEOUS at 21:10

## 2025-05-21 RX ADMIN — ROPINIROLE 0.25 MILLIGRAM(S): 5 TABLET, FILM COATED ORAL at 17:00

## 2025-05-21 RX ADMIN — FUROSEMIDE 10 MILLIGRAM(S): 10 INJECTION INTRAMUSCULAR; INTRAVENOUS at 05:46

## 2025-05-21 RX ADMIN — LAMOTRIGINE 100 MILLIGRAM(S): 150 TABLET ORAL at 14:01

## 2025-05-21 RX ADMIN — Medication 1 TABLET(S): at 21:10

## 2025-05-21 RX ADMIN — BUSPIRONE HYDROCHLORIDE 15 MILLIGRAM(S): 15 TABLET ORAL at 05:32

## 2025-05-21 RX ADMIN — ROPINIROLE 0.25 MILLIGRAM(S): 5 TABLET, FILM COATED ORAL at 05:31

## 2025-05-21 RX ADMIN — SODIUM CHLORIDE 100 MILLILITER(S): 9 INJECTION, SOLUTION INTRAVENOUS at 09:52

## 2025-05-21 RX ADMIN — HEPARIN SODIUM 5000 UNIT(S): 1000 INJECTION INTRAVENOUS; SUBCUTANEOUS at 14:01

## 2025-05-21 RX ADMIN — CEFTRIAXONE 1000 MILLIGRAM(S): 500 INJECTION, POWDER, FOR SOLUTION INTRAMUSCULAR; INTRAVENOUS at 16:59

## 2025-05-21 RX ADMIN — BUSPIRONE HYDROCHLORIDE 15 MILLIGRAM(S): 15 TABLET ORAL at 21:09

## 2025-05-21 RX ADMIN — SODIUM CHLORIDE 100 MILLILITER(S): 9 INJECTION, SOLUTION INTRAVENOUS at 17:01

## 2025-05-21 RX ADMIN — BUSPIRONE HYDROCHLORIDE 15 MILLIGRAM(S): 15 TABLET ORAL at 14:01

## 2025-05-21 RX ADMIN — HEPARIN SODIUM 5000 UNIT(S): 1000 INJECTION INTRAVENOUS; SUBCUTANEOUS at 05:32

## 2025-05-21 NOTE — DISCHARGE NOTE PROVIDER - NSDCCPCAREPLAN_GEN_ALL_CORE_FT
PRINCIPAL DISCHARGE DIAGNOSIS  Diagnosis: BPH with obstruction/lower urinary tract symptoms  Assessment and Plan of Treatment: - drink plenty of fluids  - no heavy lifting or straining  - you may shower  - call the office if you develop a fever, chills, nausea or vomiting

## 2025-05-21 NOTE — DISCHARGE NOTE PROVIDER - NSDCFUSCHEDAPPT_GEN_ALL_CORE_FT
Durga Samuel Physician [facility]  NEUROSURG [address] [place] [address]  Scheduled Appointment: 05/22/2025

## 2025-05-21 NOTE — DIETITIAN INITIAL EVALUATION ADULT - ORAL NUTRITION SUPPLEMENTS
ensure plus HP BID to optimize po intake and provide an additional 350 kcal, 20g protein per serving

## 2025-05-21 NOTE — DISCHARGE NOTE PROVIDER - NSDCMRMEDTOKEN_GEN_ALL_CORE_FT
Albuterol (Eqv-ProAir HFA) 90 mcg/inh inhalation aerosol: 2 inhaled as needed for  shortness of breath and/or wheezing  busPIRone 15 mg oral tablet: 1 tab(s) orally 3 times a day  lamoTRIgine 100 mg oral tablet: 1 tab(s) orally once a day  propranolol 10 mg oral tablet: 1 tab(s) orally 2 times a day  rOPINIRole 0.25 mg oral tablet: 1 tab(s) orally 2 times a day  vitamin b: daily   Albuterol (Eqv-ProAir HFA) 90 mcg/inh inhalation aerosol: 2 puff(s) inhaled every 6 hours as needed for  shortness of breath and/or wheezing  busPIRone 15 mg oral tablet: 1 tab(s) orally 3 times a day  cephalexin 500 mg oral capsule: 1 cap(s) orally 3 times a day  lamoTRIgine 100 mg oral tablet: 1 tab(s) orally once a day  propranolol 10 mg oral tablet: 1 tab(s) orally 2 times a day  rOPINIRole 0.25 mg oral tablet: 1 tab(s) orally 2 times a day  vitamin b: daily

## 2025-05-21 NOTE — PROGRESS NOTE ADULT - ASSESSMENT
69 yo male POD#1 s/p HoLEP, mild SHANICE  - CBI held at 8am  - cont IVF  - SHANICE possibly due to dehydration  - repeat BMP this afternoon  - TOV if urine remains clear  - d/c planning when renal function improves- 24-48hrs 71 yo male POD#1 s/p HoLEP, mild SHANICE  - CBI held at 8am  - cont IVF  - SHANICE possibly due to dehydration  - repeat BMP this afternoon  - TOV if urine remains clear  - d/c planning when renal function improves- 24-48hrs  - cont abx

## 2025-05-21 NOTE — DISCHARGE NOTE PROVIDER - CARE PROVIDER_API CALL
Kwame Tejeda  Urology  [address], [address]  [place] [address]  Phone: [phone]  Fax: [phone]  Follow Up Time: 1 week   Kwame Tejeda  Urology  [address], [address]  [place] [address]  Phone: [phone]  Fax: [phone]  Follow Up Time: 2 weeks

## 2025-05-21 NOTE — DIETITIAN NUTRITION RISK NOTIFICATION - TREATMENT: THE FOLLOWING DIET HAS BEEN RECOMMENDED
Diet, Regular (05-20-25 @ 16:54) [Active]  Diet, NPO:   Except Medications  With Ice Chips/Sips of Water     Special Instructions for Nursing:  Except Medications, With Ice Chips/Sips of Water (03-03-25 @ 14:58) [Hold]

## 2025-05-21 NOTE — DISCHARGE NOTE PROVIDER - PROVIDER TOKENS
PROVIDER:[TOKEN:[[id number]],FOLLOWUP:[1 week]] PROVIDER:[TOKEN:[[id number]],FOLLOWUP:[2 weeks]]

## 2025-05-21 NOTE — DIETITIAN INITIAL EVALUATION ADULT - PERTINENT MEDS FT
MEDICATIONS  (STANDING):lactated ringers. 1000 milliLiter(s) (100 mL/Hr) IV Continuous <Continuous>  senna 1 Tablet(s) Oral at bedtime

## 2025-05-21 NOTE — DISCHARGE NOTE PROVIDER - DETAILS OF MALNUTRITION DIAGNOSIS/DIAGNOSES
This patient has been assessed with a concern for Malnutrition and was treated during this hospitalization for the following Nutrition diagnosis/diagnoses:     -  05/21/2025: Moderate protein-calorie malnutrition   -  05/21/2025: Underweight (BMI < 19)

## 2025-05-21 NOTE — DISCHARGE NOTE PROVIDER - HOSPITAL COURSE
69 yo male with BPH underwent a scheduled HoLEP with prostate morcellation 5/20/25.  The patient was on CBI overnight and the urine remained very light pink to clear.  The patient did not have any complaints overnight, was able to tolerate a diet and remained afebrile. POD#1 the CBI was held in the morning. The patient was encouraged to continue drinking fluids.  The patient has an increase in his creatinine, possibly due to dyhydration.  The pt was hydrated with IVF and a repeat BMP was performed.  The patients urine throughout the day was fruitpunch/luisito in color..................... 71 yo male with BPH underwent a scheduled HoLEP with prostate morcellation 5/20/25.  The patient was on CBI overnight and the urine remained very light pink to clear.  The patient did not have any complaints overnight, was able to tolerate a diet and remained afebrile. POD#1 the CBI was held in the morning. The patient was encouraged to continue drinking fluids.  The patient has an increase in his creatinine, possibly due to dehydration.  The pt was hydrated with IVF and a repeat BMP was performed.  The patients urine throughout the day was fruit punch/luisito in color.  the CBI remained off and overnight the urine became clear.  The patients renal function normalized.  The gonzalez was removed on POD#2 for a TOV.  The patient was able to void adequately and is stable for d/c to home.  The patient will follow up with Dr. Tejeda in the office in 2 weeks.

## 2025-05-21 NOTE — DIETITIAN INITIAL EVALUATION ADULT - ORAL INTAKE PTA/DIET HISTORY
Hypertension Spoke with pt this AM. Decreased po intake noted and reported by pt. Reported UBW: 135#, however suspects weight change x 3 months, 127.8# on this admission. Likely -7.2# weight loss x 3 months. Recommend ensure plus HP BID for additional protein/calories. Pt endorses drinking boost at home sometimes, no hx DM, prefers brand. RD to remain available.

## 2025-05-21 NOTE — DIETITIAN INITIAL EVALUATION ADULT - PERTINENT LABORATORY DATA
05-21 Na134 mmol/L[L] Glu 111 mg/dL[H] K+ 4.1 mmol/L Cr  1.49 mg/dL[H] BUN 22.8 mg/dL[H]     A1C with Estimated Average Glucose Result: 5.8 % (04-30-25 @ 10:10)  A1C with Estimated Average Glucose Result: 5.5 % (03-03-25 @ 14:10)  A1C with Estimated Average Glucose Result: 5.7 % (09-25-24 @ 12:45)

## 2025-05-22 ENCOUNTER — TRANSCRIPTION ENCOUNTER (OUTPATIENT)
Age: 70
End: 2025-05-22

## 2025-05-22 ENCOUNTER — APPOINTMENT (OUTPATIENT)
Dept: NEUROSURGERY | Facility: CLINIC | Age: 70
End: 2025-05-22

## 2025-05-22 VITALS
DIASTOLIC BLOOD PRESSURE: 61 MMHG | HEART RATE: 65 BPM | RESPIRATION RATE: 16 BRPM | TEMPERATURE: 99 F | SYSTOLIC BLOOD PRESSURE: 108 MMHG | OXYGEN SATURATION: 96 %

## 2025-05-22 LAB
ANION GAP SERPL CALC-SCNC: 14 MMOL/L — SIGNIFICANT CHANGE UP (ref 5–17)
BUN SERPL-MCNC: 15.9 MG/DL — SIGNIFICANT CHANGE UP (ref 8–20)
CALCIUM SERPL-MCNC: 8.9 MG/DL — SIGNIFICANT CHANGE UP (ref 8.4–10.5)
CHLORIDE SERPL-SCNC: 102 MMOL/L — SIGNIFICANT CHANGE UP (ref 96–108)
CO2 SERPL-SCNC: 23 MMOL/L — SIGNIFICANT CHANGE UP (ref 22–29)
CREAT SERPL-MCNC: 1.12 MG/DL — SIGNIFICANT CHANGE UP (ref 0.5–1.3)
EGFR: 71 ML/MIN/1.73M2 — SIGNIFICANT CHANGE UP
EGFR: 71 ML/MIN/1.73M2 — SIGNIFICANT CHANGE UP
GLUCOSE SERPL-MCNC: 84 MG/DL — SIGNIFICANT CHANGE UP (ref 70–99)
HCT VFR BLD CALC: 40.5 % — SIGNIFICANT CHANGE UP (ref 39–50)
HGB BLD-MCNC: 13.1 G/DL — SIGNIFICANT CHANGE UP (ref 13–17)
MCHC RBC-ENTMCNC: 29 PG — SIGNIFICANT CHANGE UP (ref 27–34)
MCHC RBC-ENTMCNC: 32.3 G/DL — SIGNIFICANT CHANGE UP (ref 32–36)
MCV RBC AUTO: 89.6 FL — SIGNIFICANT CHANGE UP (ref 80–100)
NRBC # BLD AUTO: 0 K/UL — SIGNIFICANT CHANGE UP (ref 0–0)
NRBC # FLD: 0 K/UL — SIGNIFICANT CHANGE UP (ref 0–0)
NRBC BLD AUTO-RTO: 0 /100 WBCS — SIGNIFICANT CHANGE UP (ref 0–0)
PLATELET # BLD AUTO: 368 K/UL — SIGNIFICANT CHANGE UP (ref 150–400)
PMV BLD: 9.2 FL — SIGNIFICANT CHANGE UP (ref 7–13)
POTASSIUM SERPL-MCNC: 4.2 MMOL/L — SIGNIFICANT CHANGE UP (ref 3.5–5.3)
POTASSIUM SERPL-SCNC: 4.2 MMOL/L — SIGNIFICANT CHANGE UP (ref 3.5–5.3)
RBC # BLD: 4.52 M/UL — SIGNIFICANT CHANGE UP (ref 4.2–5.8)
RBC # FLD: 14.4 % — SIGNIFICANT CHANGE UP (ref 10.3–14.5)
SODIUM SERPL-SCNC: 138 MMOL/L — SIGNIFICANT CHANGE UP (ref 135–145)
WBC # BLD: 8.34 K/UL — SIGNIFICANT CHANGE UP (ref 3.8–10.5)
WBC # FLD AUTO: 8.34 K/UL — SIGNIFICANT CHANGE UP (ref 3.8–10.5)

## 2025-05-22 PROCEDURE — C1782: CPT

## 2025-05-22 PROCEDURE — C1889: CPT

## 2025-05-22 PROCEDURE — 88305 TISSUE EXAM BY PATHOLOGIST: CPT

## 2025-05-22 PROCEDURE — 88300 SURGICAL PATH GROSS: CPT

## 2025-05-22 PROCEDURE — 82365 CALCULUS SPECTROSCOPY: CPT

## 2025-05-22 PROCEDURE — 80048 BASIC METABOLIC PNL TOTAL CA: CPT

## 2025-05-22 PROCEDURE — 85027 COMPLETE CBC AUTOMATED: CPT

## 2025-05-22 PROCEDURE — 36415 COLL VENOUS BLD VENIPUNCTURE: CPT

## 2025-05-22 RX ORDER — CEPHALEXIN 250 MG/1
1 CAPSULE ORAL
Qty: 15 | Refills: 0
Start: 2025-05-22 | End: 2025-05-26

## 2025-05-22 RX ADMIN — Medication 4 MILLIGRAM(S): at 12:58

## 2025-05-22 RX ADMIN — HEPARIN SODIUM 5000 UNIT(S): 1000 INJECTION INTRAVENOUS; SUBCUTANEOUS at 05:54

## 2025-05-22 RX ADMIN — BUSPIRONE HYDROCHLORIDE 15 MILLIGRAM(S): 15 TABLET ORAL at 12:59

## 2025-05-22 RX ADMIN — ROPINIROLE 0.25 MILLIGRAM(S): 5 TABLET, FILM COATED ORAL at 05:54

## 2025-05-22 RX ADMIN — BUSPIRONE HYDROCHLORIDE 15 MILLIGRAM(S): 15 TABLET ORAL at 05:54

## 2025-05-22 RX ADMIN — LAMOTRIGINE 100 MILLIGRAM(S): 150 TABLET ORAL at 12:59

## 2025-05-22 RX ADMIN — HEPARIN SODIUM 5000 UNIT(S): 1000 INJECTION INTRAVENOUS; SUBCUTANEOUS at 12:59

## 2025-05-22 RX ADMIN — Medication 3 MILLILITER(S): at 13:08

## 2025-05-22 NOTE — DISCHARGE NOTE NURSING/CASE MANAGEMENT/SOCIAL WORK - FINANCIAL ASSISTANCE
[facility] provides services at a reduced cost to those who are determined to be eligible through [facility]’s financial assistance program. Information regarding [facility]’s financial assistance program can be found by going to [email or web address] or by calling [phone].

## 2025-05-22 NOTE — PROGRESS NOTE ADULT - SUBJECTIVE AND OBJECTIVE BOX
Subjective:70yMale POD#2 s/p HoLEP.  Pt resting comfortably at this time, no c/o pain.  Pt would like to go home.  Urine now clear in gonzalez tubing, pink in bag. Pt remains afebrile, tolerating a diet.    Gonzalez: clear    Vital Signs Last 24 Hrs  T(C): 36.7 (22 May 2025 07:46), Max: 36.7 (21 May 2025 19:10)  T(F): 98.1 (22 May 2025 07:46), Max: 98.1 (21 May 2025 19:10)  HR: 51 (22 May 2025 07:46) (51 - 78)  BP: 148/74 (22 May 2025 07:46) (99/64 - 155/69)  BP(mean): --  RR: 18 (22 May 2025 07:46) (16 - 18)  SpO2: 95% (22 May 2025 07:46) (93% - 97%)    Parameters below as of 22 May 2025 07:46  Patient On (Oxygen Delivery Method): room air      I&O's Detail    21 May 2025 07:01  -  22 May 2025 07:00  --------------------------------------------------------  IN:    Lactated Ringers: 225 mL    Lactated Ringers: 1200 mL  Total IN: 1425 mL    OUT:    Continuous Bladder Irrigation (mL): 2000 mL    Indwelling Catheter - Urethral (mL): 850 mL  Total OUT: 2850 mL    Total NET: -1425 mL          Labs:                        13.1   8.34  )-----------( 368      ( 22 May 2025 04:06 )             40.5     05-22    138  |  102  |  15.9  ----------------------------<  84  4.2   |  23.0  |  1.12    Ca    8.9      22 May 2025 04:06          
Subjective:70yMale POD#1 s/p HoLEP.  Pt resting comfortably, no c/o pain or discomfort.  CBI running at slow rate, urine very light pink, clear.    Mireles: very light pink    Vital Signs Last 24 Hrs  T(C): 36.6 (21 May 2025 08:00), Max: 36.8 (21 May 2025 04:47)  T(F): 97.8 (21 May 2025 08:00), Max: 98.2 (21 May 2025 04:47)  HR: 56 (21 May 2025 08:00) (54 - 68)  BP: 126/63 (21 May 2025 08:00) (90/57 - 131/63)  BP(mean): 69 (20 May 2025 14:00) (60 - 85)  RR: 18 (21 May 2025 08:00) (13 - 20)  SpO2: 95% (21 May 2025 08:00) (93% - 100%)    Parameters below as of 21 May 2025 08:00  Patient On (Oxygen Delivery Method): room air      I&O's Detail    20 May 2025 07:01  -  21 May 2025 07:00  --------------------------------------------------------  IN:    Continuous Bladder Irrigation (mL): 38591 mL    Lactated Ringers: 900 mL  Total IN: 17320 mL    OUT:    Continuous Bladder Irrigation (mL): 29263 mL    Voided (mL): 5200 mL  Total OUT: 11913 mL    Total NET: -5950 mL      21 May 2025 07:01  -  21 May 2025 09:47  --------------------------------------------------------  IN:    Lactated Ringers: 225 mL  Total IN: 225 mL    OUT:    Continuous Bladder Irrigation (mL): 2000 mL  Total OUT: 2000 mL    Total NET: -1775 mL          Labs:                        13.1   11.37 )-----------( 376      ( 21 May 2025 04:14 )             40.4     05-21    134[L]  |  99  |  22.8[H]  ----------------------------<  111[H]  4.1   |  20.0[L]  |  1.49[H]    Ca    8.5      21 May 2025 04:14

## 2025-05-22 NOTE — PROGRESS NOTE ADULT - ASSESSMENT
69 yo male POD#2 s/p HoLEP, mild SHANICE  - SHANICE resolved- likely due to dehydration  - gonzalez d/c'd this am for TOV  - bladder scans for PVR;s  - if pt voiding adequately, d/c home today, if pt retains, will reinsert gonzalez and can go home with a gonzalez to leg bag  - pt encouraged to get OOB and ambulate

## 2025-05-22 NOTE — PROGRESS NOTE ADULT - NUTRITIONAL ASSESSMENT
This patient has been assessed with a concern for Malnutrition and has been determined to have a diagnosis/diagnoses of Moderate protein-calorie malnutrition and Underweight (BMI < 19).    This patient is being managed with:   Diet Regular-  Entered: May 20 2025  4:53PM

## 2025-05-22 NOTE — DISCHARGE NOTE NURSING/CASE MANAGEMENT/SOCIAL WORK - PATIENT PORTAL LINK FT
You can access the FollowMyHealth Patient Portal offered by [facility] by registering at the following website: [email or web address]. By joining [employer]’s FollowMyHealth portal, you will also be able to view your health information using other applications (apps) compatible with our system.

## 2025-05-25 LAB — SURGICAL PATHOLOGY STUDY: SIGNIFICANT CHANGE UP

## 2025-05-30 LAB
CELL MATERIAL STONE EST-MCNT: SIGNIFICANT CHANGE UP
LABORATORY COMMENT REPORT: SIGNIFICANT CHANGE UP
NIDUS STONE QN: SIGNIFICANT CHANGE UP

## 2025-06-04 ENCOUNTER — APPOINTMENT (OUTPATIENT)
Dept: UROLOGY | Facility: CLINIC | Age: 70
End: 2025-06-04

## 2025-06-04 DIAGNOSIS — N40.1 BENIGN PROSTATIC HYPERPLASIA WITH LOWER URINARY TRACT SYMPMS: ICD-10-CM

## 2025-06-04 DIAGNOSIS — R35.1 BENIGN PROSTATIC HYPERPLASIA WITH LOWER URINARY TRACT SYMPMS: ICD-10-CM

## 2025-06-04 PROCEDURE — ZZZZZ: CPT

## 2025-06-14 NOTE — PHYSICAL EXAM
[General Appearance - Alert] : alert [General Appearance - In No Acute Distress] : in no acute distress [Oriented To Time, Place, And Person] : oriented to person, place, and time [Impaired Insight] : insight and judgment were intact [Person] : oriented to person [Place] : oriented to place [Time] : oriented to time [Extraocular Movements] : extraocular movements were intact [] : no respiratory distress [Respiration, Rhythm And Depth] : normal respiratory rhythm and effort [Exaggerated Use Of Accessory Muscles For Inspiration] : no accessory muscle use [Heart Rate And Rhythm] : heart rate was normal and rhythm regular [Involuntary Movements] : no involuntary movements were seen no see chief complaint quote

## 2025-08-07 ENCOUNTER — APPOINTMENT (OUTPATIENT)
Dept: NEUROLOGY | Facility: CLINIC | Age: 70
End: 2025-08-07
Payer: MEDICARE

## 2025-08-07 VITALS
HEIGHT: 69 IN | DIASTOLIC BLOOD PRESSURE: 71 MMHG | WEIGHT: 130 LBS | OXYGEN SATURATION: 97 % | SYSTOLIC BLOOD PRESSURE: 116 MMHG | HEART RATE: 106 BPM | BODY MASS INDEX: 19.26 KG/M2

## 2025-08-07 DIAGNOSIS — R25.9 UNSPECIFIED ABNORMAL INVOLUNTARY MOVEMENTS: ICD-10-CM

## 2025-08-07 PROCEDURE — 99205 OFFICE O/P NEW HI 60 MIN: CPT

## 2025-08-07 RX ORDER — QUETIAPINE FUMARATE 400 MG/1
TABLET ORAL
Refills: 0 | Status: ACTIVE | COMMUNITY

## 2025-08-07 RX ORDER — PROPRANOLOL HYDROCHLORIDE 10 MG/1
10 TABLET ORAL
Qty: 60 | Refills: 1 | Status: ACTIVE | COMMUNITY
Start: 2025-08-07 | End: 1900-01-01

## (undated) DEVICE — Device

## (undated) DEVICE — TUBING CONNECTING 6MM 20FT

## (undated) DEVICE — ELCTR CUTTING 24/26FR

## (undated) DEVICE — SOL IRR POUR NS 0.9% 1000ML

## (undated) DEVICE — TUBING JET BIPOLAR

## (undated) DEVICE — GLV 7 PROTEXIS (WHITE)

## (undated) DEVICE — POSITIONER FOAM EGG CRATE ULNAR 2PCS (PINK)

## (undated) DEVICE — VENODYNE/SCD SLEEVE CALF MEDIUM

## (undated) DEVICE — ELCTR PLASMA BUTTON OVAL 24FR 12-30 DEG

## (undated) DEVICE — DRAPE MICROSCOPE ZEISS OPMI VISIONGUARD 154 X 52"

## (undated) DEVICE — CODMAN PERFORATOR 14MM (BLUE)

## (undated) DEVICE — ELCTR BOVIE TIP BLADE INSULATED 2.75" EDGE

## (undated) DEVICE — ELCTR ROCKER SWITCH PENCIL BLUE 10FT

## (undated) DEVICE — BIOMET BATTERY DISP

## (undated) DEVICE — PACK CYSTOSCOPY TIBURON

## (undated) DEVICE — MINI DOPPLER PROBE

## (undated) DEVICE — SUT VICRYL 3-0 18" SH UNDYED (POP-OFF)

## (undated) DEVICE — FOLEY TRAY 16FR 5CC LTX UMETER CLOSED

## (undated) DEVICE — DRSG XEROFORM 1 X 8"

## (undated) DEVICE — ARACHNOID BACKCUTTING LONG HANDLE 8"

## (undated) DEVICE — DRAPE TOWEL BLUE 17" X 24"

## (undated) DEVICE — GOWN TRIMAX LG

## (undated) DEVICE — STAPLER SKIN PROXIMATE

## (undated) DEVICE — CATH IV SAFE BC 18G X 1.16" (GREEN)

## (undated) DEVICE — TUBING SET FOR SUCTION PUMP

## (undated) DEVICE — DRAPE CRANIOTOMY W POUCH 100X104"

## (undated) DEVICE — TUBING SUCTION 20FT

## (undated) DEVICE — SUT ETHILON 3-0 18" FS-1

## (undated) DEVICE — SYR LUER LOK 30CC

## (undated) DEVICE — SYR LUER LOK 20CC

## (undated) DEVICE — TUBING IRR SET FOR CYSTOSCOPY 77"

## (undated) DEVICE — PACK NEURO

## (undated) DEVICE — TUBING LEVEL ONE NORMOFLO SET

## (undated) DEVICE — GLV 7.5 PROTEXIS (WHITE)

## (undated) DEVICE — VENODYNE/SCD SLEEVE CALF LARGE

## (undated) DEVICE — DRAPE 3/4 SHEET W REINFORCEMENT 56X77"

## (undated) DEVICE — MIDAS REX LEGEND TAPERED SM BORE 2.3MM X 8CM

## (undated) DEVICE — DRAPE INSTRUMENT POUCH 6.75" X 11"

## (undated) DEVICE — BIPOLAR FORCEP STRYKER STANDARD 8" X 0.5MM (YELLOW)

## (undated) DEVICE — AESCULAP SCALPFIX 10 CLIPS

## (undated) DEVICE — SYR LUER LOK 50CC

## (undated) DEVICE — MIDAS REX MR8 BALL FLUTED SM BORE 3MM X 10CM

## (undated) DEVICE — DRSG TELFA 3 X 8

## (undated) DEVICE — ARACHNOID BACKCUTTING SUPERFICIAL HANDLE 5"

## (undated) DEVICE — MARKING PEN W RULER

## (undated) DEVICE — LONE STAR ELASTIC STAY HOOK 12MM BLUNT

## (undated) DEVICE — ELCTR PLASMA LOOP MEDIUM 24FR 12-30 DEG

## (undated) DEVICE — SUT NUROLON 4-0 8-18" TF (POP-OFF)

## (undated) DEVICE — DRAIN JACKSON PRATT 3 SPRING RESERVOIR W 7FR PVC DRAIN

## (undated) DEVICE — SOL IRR POUR H2O 1000ML

## (undated) DEVICE — PREP DURAPREP 26CC

## (undated) DEVICE — TUBING TUR 2 PRONG

## (undated) DEVICE — DRAPE XL SHEET 77X98"